# Patient Record
Sex: FEMALE | Race: WHITE | Employment: FULL TIME | URBAN - METROPOLITAN AREA
[De-identification: names, ages, dates, MRNs, and addresses within clinical notes are randomized per-mention and may not be internally consistent; named-entity substitution may affect disease eponyms.]

---

## 2017-05-01 ENCOUNTER — APPOINTMENT (OUTPATIENT)
Dept: CT IMAGING | Age: 52
DRG: 391 | End: 2017-05-01
Attending: EMERGENCY MEDICINE
Payer: COMMERCIAL

## 2017-05-01 ENCOUNTER — HOSPITAL ENCOUNTER (INPATIENT)
Age: 52
LOS: 5 days | Discharge: HOME | DRG: 391 | End: 2017-05-06
Attending: EMERGENCY MEDICINE | Admitting: INTERNAL MEDICINE
Payer: COMMERCIAL

## 2017-05-01 DIAGNOSIS — K57.32 DIVERTICULITIS OF SIGMOID COLON: Primary | ICD-10-CM

## 2017-05-01 DIAGNOSIS — K85.91 ACUTE PANCREATITIS WITH UNINFECTED NECROSIS, UNSPECIFIED PANCREATITIS TYPE: ICD-10-CM

## 2017-05-01 LAB
ALBUMIN SERPL BCP-MCNC: 3.1 G/DL (ref 3.5–5)
ALBUMIN/GLOB SERPL: 0.7 {RATIO} (ref 1.1–2.2)
ALP SERPL-CCNC: 55 U/L (ref 45–117)
ALT SERPL-CCNC: 13 U/L (ref 12–78)
ANION GAP BLD CALC-SCNC: 10 MMOL/L (ref 5–15)
APPEARANCE UR: CLEAR
AST SERPL W P-5'-P-CCNC: 8 U/L (ref 15–37)
ATRIAL RATE: 91 BPM
BACTERIA URNS QL MICRO: NEGATIVE /HPF
BASOPHILS # BLD AUTO: 0 K/UL (ref 0–0.1)
BASOPHILS # BLD: 0 % (ref 0–1)
BILIRUB SERPL-MCNC: 0.3 MG/DL (ref 0.2–1)
BILIRUB UR QL: NEGATIVE
BUN SERPL-MCNC: 16 MG/DL (ref 6–20)
BUN/CREAT SERPL: 24 (ref 12–20)
CALCIUM SERPL-MCNC: 8.6 MG/DL (ref 8.5–10.1)
CALCULATED P AXIS, ECG09: 62 DEGREES
CALCULATED R AXIS, ECG10: 62 DEGREES
CALCULATED T AXIS, ECG11: 67 DEGREES
CHLORIDE SERPL-SCNC: 108 MMOL/L (ref 97–108)
CO2 SERPL-SCNC: 24 MMOL/L (ref 21–32)
COLOR UR: ABNORMAL
CREAT SERPL-MCNC: 0.67 MG/DL (ref 0.55–1.02)
DIAGNOSIS, 93000: NORMAL
EOSINOPHIL # BLD: 0.1 K/UL (ref 0–0.4)
EOSINOPHIL NFR BLD: 1 % (ref 0–7)
EPITH CASTS URNS QL MICRO: ABNORMAL /LPF
ERYTHROCYTE [DISTWIDTH] IN BLOOD BY AUTOMATED COUNT: 13.8 % (ref 11.5–14.5)
GLOBULIN SER CALC-MCNC: 4.4 G/DL (ref 2–4)
GLUCOSE SERPL-MCNC: 116 MG/DL (ref 65–100)
GLUCOSE UR STRIP.AUTO-MCNC: NEGATIVE MG/DL
HCT VFR BLD AUTO: 39.3 % (ref 35–47)
HGB BLD-MCNC: 13.1 G/DL (ref 11.5–16)
HGB UR QL STRIP: NEGATIVE
KETONES UR QL STRIP.AUTO: NEGATIVE MG/DL
LACTATE SERPL-SCNC: 0.8 MMOL/L (ref 0.4–2)
LEUKOCYTE ESTERASE UR QL STRIP.AUTO: ABNORMAL
LIPASE SERPL-CCNC: 1167 U/L (ref 73–393)
LYMPHOCYTES # BLD AUTO: 16 % (ref 12–49)
LYMPHOCYTES # BLD: 2.3 K/UL (ref 0.8–3.5)
MCH RBC QN AUTO: 29.4 PG (ref 26–34)
MCHC RBC AUTO-ENTMCNC: 33.3 G/DL (ref 30–36.5)
MCV RBC AUTO: 88.1 FL (ref 80–99)
MONOCYTES # BLD: 1 K/UL (ref 0–1)
MONOCYTES NFR BLD AUTO: 7 % (ref 5–13)
NEUTS SEG # BLD: 10.7 K/UL (ref 1.8–8)
NEUTS SEG NFR BLD AUTO: 76 % (ref 32–75)
NITRITE UR QL STRIP.AUTO: NEGATIVE
P-R INTERVAL, ECG05: 162 MS
PH UR STRIP: 6 [PH] (ref 5–8)
PLATELET # BLD AUTO: 408 K/UL (ref 150–400)
POTASSIUM SERPL-SCNC: 3.5 MMOL/L (ref 3.5–5.1)
PROT SERPL-MCNC: 7.5 G/DL (ref 6.4–8.2)
PROT UR STRIP-MCNC: NEGATIVE MG/DL
Q-T INTERVAL, ECG07: 368 MS
QRS DURATION, ECG06: 86 MS
QTC CALCULATION (BEZET), ECG08: 452 MS
RBC # BLD AUTO: 4.46 M/UL (ref 3.8–5.2)
RBC #/AREA URNS HPF: ABNORMAL /HPF (ref 0–5)
SODIUM SERPL-SCNC: 142 MMOL/L (ref 136–145)
SP GR UR REFRACTOMETRY: <1.005 (ref 1–1.03)
TROPONIN I SERPL-MCNC: <0.04 NG/ML
UA: UC IF INDICATED,UAUC: ABNORMAL
UROBILINOGEN UR QL STRIP.AUTO: 0.2 EU/DL (ref 0.2–1)
VENTRICULAR RATE, ECG03: 91 BPM
WBC # BLD AUTO: 14 K/UL (ref 3.6–11)
WBC URNS QL MICRO: ABNORMAL /HPF (ref 0–4)

## 2017-05-01 PROCEDURE — 85025 COMPLETE CBC W/AUTO DIFF WBC: CPT | Performed by: PHYSICIAN ASSISTANT

## 2017-05-01 PROCEDURE — 74177 CT ABD & PELVIS W/CONTRAST: CPT

## 2017-05-01 PROCEDURE — 74011636320 HC RX REV CODE- 636/320: Performed by: EMERGENCY MEDICINE

## 2017-05-01 PROCEDURE — 74011250637 HC RX REV CODE- 250/637: Performed by: INTERNAL MEDICINE

## 2017-05-01 PROCEDURE — 99285 EMERGENCY DEPT VISIT HI MDM: CPT

## 2017-05-01 PROCEDURE — 74011000250 HC RX REV CODE- 250: Performed by: INTERNAL MEDICINE

## 2017-05-01 PROCEDURE — 74011250636 HC RX REV CODE- 250/636: Performed by: EMERGENCY MEDICINE

## 2017-05-01 PROCEDURE — 93005 ELECTROCARDIOGRAM TRACING: CPT

## 2017-05-01 PROCEDURE — 96361 HYDRATE IV INFUSION ADD-ON: CPT

## 2017-05-01 PROCEDURE — 84484 ASSAY OF TROPONIN QUANT: CPT | Performed by: EMERGENCY MEDICINE

## 2017-05-01 PROCEDURE — 65270000015 HC RM PRIVATE ONCOLOGY

## 2017-05-01 PROCEDURE — 83690 ASSAY OF LIPASE: CPT | Performed by: EMERGENCY MEDICINE

## 2017-05-01 PROCEDURE — 87040 BLOOD CULTURE FOR BACTERIA: CPT | Performed by: EMERGENCY MEDICINE

## 2017-05-01 PROCEDURE — 96375 TX/PRO/DX INJ NEW DRUG ADDON: CPT

## 2017-05-01 PROCEDURE — 96365 THER/PROPH/DIAG IV INF INIT: CPT

## 2017-05-01 PROCEDURE — 74011000258 HC RX REV CODE- 258: Performed by: EMERGENCY MEDICINE

## 2017-05-01 PROCEDURE — 74011250636 HC RX REV CODE- 250/636: Performed by: PHYSICIAN ASSISTANT

## 2017-05-01 PROCEDURE — 36415 COLL VENOUS BLD VENIPUNCTURE: CPT | Performed by: PHYSICIAN ASSISTANT

## 2017-05-01 PROCEDURE — 81001 URINALYSIS AUTO W/SCOPE: CPT | Performed by: PHYSICIAN ASSISTANT

## 2017-05-01 PROCEDURE — 83605 ASSAY OF LACTIC ACID: CPT | Performed by: EMERGENCY MEDICINE

## 2017-05-01 PROCEDURE — 80053 COMPREHEN METABOLIC PANEL: CPT | Performed by: EMERGENCY MEDICINE

## 2017-05-01 PROCEDURE — 74011250636 HC RX REV CODE- 250/636: Performed by: INTERNAL MEDICINE

## 2017-05-01 RX ORDER — SODIUM CHLORIDE 9 MG/ML
50 INJECTION, SOLUTION INTRAVENOUS
Status: COMPLETED | OUTPATIENT
Start: 2017-05-01 | End: 2017-05-01

## 2017-05-01 RX ORDER — HYDROMORPHONE HYDROCHLORIDE 2 MG/ML
2 INJECTION, SOLUTION INTRAMUSCULAR; INTRAVENOUS; SUBCUTANEOUS
Status: DISCONTINUED | OUTPATIENT
Start: 2017-05-01 | End: 2017-05-01 | Stop reason: SDUPTHER

## 2017-05-01 RX ORDER — SODIUM CHLORIDE 0.9 % (FLUSH) 0.9 %
5-10 SYRINGE (ML) INJECTION AS NEEDED
Status: DISCONTINUED | OUTPATIENT
Start: 2017-05-01 | End: 2017-05-06 | Stop reason: HOSPADM

## 2017-05-01 RX ORDER — HYDRALAZINE HYDROCHLORIDE 20 MG/ML
10 INJECTION INTRAMUSCULAR; INTRAVENOUS
Status: DISCONTINUED | OUTPATIENT
Start: 2017-05-01 | End: 2017-05-06 | Stop reason: HOSPADM

## 2017-05-01 RX ORDER — BISACODYL 5 MG
5 TABLET, DELAYED RELEASE (ENTERIC COATED) ORAL DAILY PRN
Status: DISCONTINUED | OUTPATIENT
Start: 2017-05-01 | End: 2017-05-06 | Stop reason: HOSPADM

## 2017-05-01 RX ORDER — METRONIDAZOLE 500 MG/100ML
500 INJECTION, SOLUTION INTRAVENOUS EVERY 8 HOURS
Status: DISCONTINUED | OUTPATIENT
Start: 2017-05-01 | End: 2017-05-05

## 2017-05-01 RX ORDER — ONDANSETRON 2 MG/ML
4 INJECTION INTRAMUSCULAR; INTRAVENOUS
Status: DISCONTINUED | OUTPATIENT
Start: 2017-05-01 | End: 2017-05-06 | Stop reason: HOSPADM

## 2017-05-01 RX ORDER — SODIUM CHLORIDE, SODIUM LACTATE, POTASSIUM CHLORIDE, CALCIUM CHLORIDE 600; 310; 30; 20 MG/100ML; MG/100ML; MG/100ML; MG/100ML
100 INJECTION, SOLUTION INTRAVENOUS CONTINUOUS
Status: DISCONTINUED | OUTPATIENT
Start: 2017-05-01 | End: 2017-05-05

## 2017-05-01 RX ORDER — VALSARTAN 40 MG/1
40 TABLET ORAL DAILY
Status: DISCONTINUED | OUTPATIENT
Start: 2017-05-02 | End: 2017-05-02

## 2017-05-01 RX ORDER — HYDROMORPHONE HYDROCHLORIDE 1 MG/ML
2 INJECTION, SOLUTION INTRAMUSCULAR; INTRAVENOUS; SUBCUTANEOUS
Status: DISCONTINUED | OUTPATIENT
Start: 2017-05-01 | End: 2017-05-05

## 2017-05-01 RX ORDER — ONDANSETRON 2 MG/ML
4 INJECTION INTRAMUSCULAR; INTRAVENOUS
Status: COMPLETED | OUTPATIENT
Start: 2017-05-01 | End: 2017-05-01

## 2017-05-01 RX ORDER — ACETAMINOPHEN 325 MG/1
650 TABLET ORAL
Status: DISCONTINUED | OUTPATIENT
Start: 2017-05-01 | End: 2017-05-06 | Stop reason: HOSPADM

## 2017-05-01 RX ORDER — LEVOFLOXACIN 5 MG/ML
750 INJECTION, SOLUTION INTRAVENOUS EVERY 24 HOURS
Status: DISCONTINUED | OUTPATIENT
Start: 2017-05-01 | End: 2017-05-05

## 2017-05-01 RX ORDER — SODIUM CHLORIDE 0.9 % (FLUSH) 0.9 %
5-10 SYRINGE (ML) INJECTION EVERY 8 HOURS
Status: DISCONTINUED | OUTPATIENT
Start: 2017-05-01 | End: 2017-05-06 | Stop reason: HOSPADM

## 2017-05-01 RX ORDER — IBUPROFEN 200 MG
1 TABLET ORAL
Status: DISCONTINUED | OUTPATIENT
Start: 2017-05-01 | End: 2017-05-06 | Stop reason: HOSPADM

## 2017-05-01 RX ORDER — ENOXAPARIN SODIUM 100 MG/ML
40 INJECTION SUBCUTANEOUS DAILY
Status: DISCONTINUED | OUTPATIENT
Start: 2017-05-02 | End: 2017-05-06 | Stop reason: HOSPADM

## 2017-05-01 RX ORDER — HYDROMORPHONE HYDROCHLORIDE 1 MG/ML
1 INJECTION, SOLUTION INTRAMUSCULAR; INTRAVENOUS; SUBCUTANEOUS
Status: DISCONTINUED | OUTPATIENT
Start: 2017-05-01 | End: 2017-05-05

## 2017-05-01 RX ORDER — NALOXONE HYDROCHLORIDE 0.4 MG/ML
0.4 INJECTION, SOLUTION INTRAMUSCULAR; INTRAVENOUS; SUBCUTANEOUS AS NEEDED
Status: DISCONTINUED | OUTPATIENT
Start: 2017-05-01 | End: 2017-05-06 | Stop reason: HOSPADM

## 2017-05-01 RX ORDER — MORPHINE SULFATE 4 MG/ML
4 INJECTION, SOLUTION INTRAMUSCULAR; INTRAVENOUS
Status: COMPLETED | OUTPATIENT
Start: 2017-05-01 | End: 2017-05-01

## 2017-05-01 RX ORDER — MORPHINE SULFATE 2 MG/ML
4 INJECTION, SOLUTION INTRAMUSCULAR; INTRAVENOUS
Status: COMPLETED | OUTPATIENT
Start: 2017-05-01 | End: 2017-05-01

## 2017-05-01 RX ORDER — SODIUM CHLORIDE 0.9 % (FLUSH) 0.9 %
10 SYRINGE (ML) INJECTION
Status: COMPLETED | OUTPATIENT
Start: 2017-05-01 | End: 2017-05-01

## 2017-05-01 RX ADMIN — Medication 10 ML: at 14:16

## 2017-05-01 RX ADMIN — Medication 5 ML: at 21:58

## 2017-05-01 RX ADMIN — PIPERACILLIN SODIUM,TAZOBACTAM SODIUM 3.38 G: 3; .375 INJECTION, POWDER, FOR SOLUTION INTRAVENOUS at 12:03

## 2017-05-01 RX ADMIN — HYDROMORPHONE HYDROCHLORIDE 2 MG: 1 INJECTION, SOLUTION INTRAMUSCULAR; INTRAVENOUS; SUBCUTANEOUS at 17:59

## 2017-05-01 RX ADMIN — ONDANSETRON HYDROCHLORIDE 4 MG: 2 INJECTION, SOLUTION INTRAMUSCULAR; INTRAVENOUS at 10:11

## 2017-05-01 RX ADMIN — SODIUM CHLORIDE, SODIUM LACTATE, POTASSIUM CHLORIDE, AND CALCIUM CHLORIDE 150 ML/HR: 600; 310; 30; 20 INJECTION, SOLUTION INTRAVENOUS at 23:29

## 2017-05-01 RX ADMIN — ONDANSETRON HYDROCHLORIDE 4 MG: 2 INJECTION, SOLUTION INTRAMUSCULAR; INTRAVENOUS at 18:44

## 2017-05-01 RX ADMIN — METRONIDAZOLE 500 MG: 500 INJECTION, SOLUTION INTRAVENOUS at 20:18

## 2017-05-01 RX ADMIN — SODIUM CHLORIDE 1000 ML: 900 INJECTION, SOLUTION INTRAVENOUS at 10:05

## 2017-05-01 RX ADMIN — HYDROMORPHONE HYDROCHLORIDE 2 MG: 1 INJECTION, SOLUTION INTRAMUSCULAR; INTRAVENOUS; SUBCUTANEOUS at 14:08

## 2017-05-01 RX ADMIN — Medication 10 ML: at 11:17

## 2017-05-01 RX ADMIN — IOPAMIDOL 100 ML: 755 INJECTION, SOLUTION INTRAVENOUS at 11:14

## 2017-05-01 RX ADMIN — Medication 10 ML: at 10:07

## 2017-05-01 RX ADMIN — LEVOFLOXACIN 750 MG: 5 INJECTION, SOLUTION INTRAVENOUS at 17:59

## 2017-05-01 RX ADMIN — ONDANSETRON HYDROCHLORIDE 4 MG: 2 INJECTION, SOLUTION INTRAMUSCULAR; INTRAVENOUS at 21:58

## 2017-05-01 RX ADMIN — Medication 4 MG: at 12:31

## 2017-05-01 RX ADMIN — SODIUM CHLORIDE 1000 ML: 900 INJECTION, SOLUTION INTRAVENOUS at 12:01

## 2017-05-01 RX ADMIN — MORPHINE SULFATE 4 MG: 4 INJECTION, SOLUTION INTRAMUSCULAR; INTRAVENOUS at 10:08

## 2017-05-01 RX ADMIN — SODIUM CHLORIDE 50 ML/HR: 900 INJECTION, SOLUTION INTRAVENOUS at 11:18

## 2017-05-01 RX ADMIN — Medication 10 ML: at 14:17

## 2017-05-01 RX ADMIN — SODIUM CHLORIDE, SODIUM LACTATE, POTASSIUM CHLORIDE, AND CALCIUM CHLORIDE 150 ML/HR: 600; 310; 30; 20 INJECTION, SOLUTION INTRAVENOUS at 14:07

## 2017-05-01 RX ADMIN — DIATRIZOATE MEGLUMINE AND DIATRIZOATE SODIUM 30 ML: 660; 100 LIQUID ORAL; RECTAL at 10:19

## 2017-05-01 RX ADMIN — HYDROMORPHONE HYDROCHLORIDE 2 MG: 1 INJECTION, SOLUTION INTRAMUSCULAR; INTRAVENOUS; SUBCUTANEOUS at 21:58

## 2017-05-01 NOTE — ED NOTES
TRANSFER - OUT REPORT:    Verbal report given to  Cammie Lee (name) on Arloa Ahumada  being transferred to [phone] (unit) for routine progression of care       Report consisted of patients Situation, Background, Assessment and   Recommendations(SBAR). Information from the following report(s) ED Summary was reviewed with the receiving nurse. Lines:   Peripheral IV 05/01/17 Left Hand (Active)   Site Assessment Clean, dry, & intact 5/1/2017  9:22 AM   Phlebitis Assessment 0 5/1/2017  9:22 AM   Infiltration Assessment 0 5/1/2017  9:22 AM   Dressing Status Clean, dry, & intact 5/1/2017  9:22 AM       Peripheral IV 05/01/17 Right Antecubital (Active)   Site Assessment Clean, dry, & intact 5/1/2017 12:04 PM   Phlebitis Assessment 0 5/1/2017 12:04 PM   Infiltration Assessment 0 5/1/2017 12:04 PM   Dressing Status Clean, dry, & intact 5/1/2017 12:04 PM        Opportunity for questions and clarification was provided.       Patient transported with:   Monitor not needed for medical transport

## 2017-05-01 NOTE — ED PROVIDER NOTES
HPI Comments: 47 yo F with PMHx of diverticulitis, HTN, hx of ectopic preg presenting with diffuse abdominal pain since 8pm last night. It is associated with nausea. Denies f/c. Pt states she was recently treated with 2 courses of antibiotics in the past 3 weeks for diverticulitis. Pt states she felt well on Saturday and thought she was getting over it. Pain initially was mostly RLQ and now generalized. States she has been having some dark urine but denies dysuria. Denies nausea currently. Last normal BM was 5 weeks ago but she still has small hard stools. Denies diarrhea. Patient is a 46 y.o. female presenting with abdominal pain. Abdominal Pain    Associated symptoms include nausea and constipation. Pertinent negatives include no diarrhea. Past Medical History:   Diagnosis Date    Gastrointestinal disorder     diverticulitis    Hypertension        Past Surgical History:   Procedure Laterality Date    HX GYN      ectopic pregnancy    HX GYN      BTL         Family History:   Problem Relation Age of Onset    Colon Cancer         Social History     Social History    Marital status:      Spouse name: N/A    Number of children: N/A    Years of education: N/A     Occupational History    Not on file. Social History Main Topics    Smoking status: Current Every Day Smoker     Packs/day: 1.50     Types: Cigarettes    Smokeless tobacco: Never Used    Alcohol use 0.0 oz/week     0 Standard drinks or equivalent per week      Comment: socially    Drug use: Yes     Special: Marijuana      Comment: once a week    Sexual activity: Yes     Partners: Male     Birth control/ protection: Surgical     Other Topics Concern    Not on file     Social History Narrative         ALLERGIES: Lisinopril    Review of Systems   Constitutional: Negative. HENT: Negative. Respiratory: Negative. Cardiovascular: Negative. Gastrointestinal: Positive for abdominal pain, constipation and nausea.  Negative for diarrhea and rectal pain. Genitourinary: Negative. Musculoskeletal: Negative. Skin: Negative. Psychiatric/Behavioral: Negative. Vitals:    05/01/17 0902 05/01/17 0903   BP: 140/86 140/86   Pulse:  88   Resp:  16   Temp:  98 °F (36.7 °C)   Weight:  85.3 kg (188 lb 0.8 oz)   Height:  5' 2\" (1.575 m)            Physical Exam   Constitutional: She is oriented to person, place, and time. She appears well-developed and well-nourished. HENT:   Head: Normocephalic and atraumatic. Eyes: Conjunctivae and EOM are normal.   Neck: Neck supple. Cardiovascular: Normal rate and regular rhythm. Pulmonary/Chest: Effort normal and breath sounds normal. No respiratory distress. She has no wheezes. She has no rales. Abdominal: Soft. Bowel sounds are normal.   Diffuse TTP, No rebound, voluntary guarding. + Jolt test.   Musculoskeletal: Normal range of motion. Neurological: She is alert and oriented to person, place, and time. Skin: Skin is warm and dry. Psychiatric: She has a normal mood and affect. Nursing note and vitals reviewed. MDM  Number of Diagnoses or Management Options  Diagnosis management comments: DDx: diverticulitis, perforation, bowel obstruction    45 yo well appearing female with PMHx of diverticulitis presenting with c/o diffuse abdominal pain in setting of completion of 2 courses of antibiotics for diverticulitis. Obtain labs, CT A/P. Give IVF, analgesics. ED Course   1145: Pt continues with pain. Will re-dose Morphine. 1200: Pt CT demonstrates persistent diverticulitis in addition to pancreatitis. Blood cx and Zosyn ordered. Discussed with Hospitalist.      Consult Note 1215    Haris Tenorio MD spoke with Dr. Belgica Donis  Specialty: IM  Discussed pt's hx, disposition, and available diagnostic and imaging results. Reviewed care plans. Consultant recommends plan as outlined:no new recommendations.       Procedures  LABORATORY TESTS:  Recent Results (from the past 12 hour(s))   CBC WITH AUTOMATED DIFF    Collection Time: 05/01/17  9:59 AM   Result Value Ref Range    WBC 14.0 (H) 3.6 - 11.0 K/uL    RBC 4.46 3.80 - 5.20 M/uL    HGB 13.1 11.5 - 16.0 g/dL    HCT 39.3 35.0 - 47.0 %    MCV 88.1 80.0 - 99.0 FL    MCH 29.4 26.0 - 34.0 PG    MCHC 33.3 30.0 - 36.5 g/dL    RDW 13.8 11.5 - 14.5 %    PLATELET 997 (H) 682 - 400 K/uL    NEUTROPHILS 76 (H) 32 - 75 %    LYMPHOCYTES 16 12 - 49 %    MONOCYTES 7 5 - 13 %    EOSINOPHILS 1 0 - 7 %    BASOPHILS 0 0 - 1 %    ABS. NEUTROPHILS 10.7 (H) 1.8 - 8.0 K/UL    ABS. LYMPHOCYTES 2.3 0.8 - 3.5 K/UL    ABS. MONOCYTES 1.0 0.0 - 1.0 K/UL    ABS. EOSINOPHILS 0.1 0.0 - 0.4 K/UL    ABS. BASOPHILS 0.0 0.0 - 0.1 K/UL   TROPONIN I    Collection Time: 05/01/17  9:59 AM   Result Value Ref Range    Troponin-I, Qt. <0.04 <0.05 ng/mL   LIPASE    Collection Time: 05/01/17  9:59 AM   Result Value Ref Range    Lipase 1167 (H) 73 - 640 U/L   METABOLIC PANEL, COMPREHENSIVE    Collection Time: 05/01/17  9:59 AM   Result Value Ref Range    Sodium 142 136 - 145 mmol/L    Potassium 3.5 3.5 - 5.1 mmol/L    Chloride 108 97 - 108 mmol/L    CO2 24 21 - 32 mmol/L    Anion gap 10 5 - 15 mmol/L    Glucose 116 (H) 65 - 100 mg/dL    BUN 16 6 - 20 MG/DL    Creatinine 0.67 0.55 - 1.02 MG/DL    BUN/Creatinine ratio 24 (H) 12 - 20      GFR est AA >60 >60 ml/min/1.73m2    GFR est non-AA >60 >60 ml/min/1.73m2    Calcium 8.6 8.5 - 10.1 MG/DL    Bilirubin, total 0.3 0.2 - 1.0 MG/DL    ALT (SGPT) 13 12 - 78 U/L    AST (SGOT) 8 (L) 15 - 37 U/L    Alk.  phosphatase 55 45 - 117 U/L    Protein, total 7.5 6.4 - 8.2 g/dL    Albumin 3.1 (L) 3.5 - 5.0 g/dL    Globulin 4.4 (H) 2.0 - 4.0 g/dL    A-G Ratio 0.7 (L) 1.1 - 2.2     LACTIC ACID, PLASMA    Collection Time: 05/01/17 10:00 AM   Result Value Ref Range    Lactic acid 0.8 0.4 - 2.0 MMOL/L   EKG, 12 LEAD, INITIAL    Collection Time: 05/01/17 10:26 AM   Result Value Ref Range    Ventricular Rate 91 BPM    Atrial Rate 91 BPM    P-R Interval 162 ms    QRS Duration 86 ms    Q-T Interval 368 ms    QTC Calculation (Bezet) 452 ms    Calculated P Axis 62 degrees    Calculated R Axis 62 degrees    Calculated T Axis 67 degrees    Diagnosis       Normal sinus rhythm  Normal ECG  When compared with ECG of 16-AUG-2011 20:42,  Nonspecific T wave abnormality no longer evident in Anterior leads  Confirmed by Eugene Esparza [id number]) on 5/1/2017 11:22:46 AM     URINALYSIS W/ REFLEX CULTURE    Collection Time: 05/01/17 12:06 PM   Result Value Ref Range    Color YELLOW/STRAW      Appearance CLEAR CLEAR      Specific gravity <1.005 1.003 - 1.030    pH (UA) 6.0 5.0 - 8.0      Protein NEGATIVE  NEG mg/dL    Glucose NEGATIVE  NEG mg/dL    Ketone NEGATIVE  NEG mg/dL    Bilirubin NEGATIVE  NEG      Blood NEGATIVE  NEG      Urobilinogen 0.2 0.2 - 1.0 EU/dL    Nitrites NEGATIVE  NEG      Leukocyte Esterase SMALL (A) NEG      WBC 0-4 0 - 4 /hpf    RBC 0-5 0 - 5 /hpf    Epithelial cells FEW FEW /lpf    Bacteria NEGATIVE  NEG /hpf    UA:UC IF INDICATED CULTURE NOT INDICATED BY UA RESULT CNI         IMAGING RESULTS:  CT ABD PELV W CONT   Final Result          MEDICATIONS GIVEN:  Medications   sodium chloride (NS) flush 5-10 mL (10 mL IntraVENous Given 5/1/17 1417)   sodium chloride (NS) flush 5-10 mL (not administered)   sodium chloride (NS) flush 5-10 mL (10 mL IntraVENous Given 5/1/17 1416)   sodium chloride (NS) flush 5-10 mL (not administered)   acetaminophen (TYLENOL) tablet 650 mg (not administered)   HYDROmorphone (PF) (DILAUDID) injection 1 mg (not administered)   naloxone (NARCAN) injection 0.4 mg (not administered)   ondansetron (ZOFRAN) injection 4 mg (not administered)   bisacodyl (DULCOLAX) tablet 5 mg (not administered)   enoxaparin (LOVENOX) injection 40 mg (not administered)   lactated ringers infusion (150 mL/hr IntraVENous New Bag 5/1/17 1407)   levoFLOXacin (LEVAQUIN) 750 mg in D5W IVPB (not administered)   metroNIDAZOLE (FLAGYL) IVPB premix 500 mg (not administered) hydrALAZINE (APRESOLINE) 20 mg/mL injection 10 mg (not administered)   HYDROmorphone (PF) (DILAUDID) injection 2 mg (2 mg IntraVENous Given 5/1/17 1408)   valsartan (DIOVAN) tablet 40 mg (not administered)   nicotine (NICODERM CQ) 21 mg/24 hr patch 1 Patch (1 Patch TransDERmal Apply Patch 5/1/17 1408)   sodium chloride 0.9 % bolus infusion 1,000 mL (0 mL IntraVENous IV Completed 5/1/17 1318)   morphine injection 4 mg (4 mg IntraVENous Given 5/1/17 1008)   sodium chloride 0.9 % bolus infusion 1,000 mL (0 mL IntraVENous IV Completed 5/1/17 1200)   0.9% sodium chloride infusion (0 mL/hr IntraVENous IV Completed 5/1/17 1200)   sodium chloride (NS) flush 10 mL (10 mL IntraVENous Given 5/1/17 1117)   iopamidol (ISOVUE-370) 76 % injection 100 mL (100 mL IntraVENous Given 5/1/17 1114)   diatrizoate meglumine-d.sodium (MD-GASTROVIEW,GASTROGRAFIN) 66-10 % contrast solution 30 mL (30 mL Oral Given 5/1/17 1019)   ondansetron (ZOFRAN) injection 4 mg (4 mg IntraVENous Given 5/1/17 1011)   piperacillin-tazobactam (ZOSYN) 3.375 g in 0.9% sodium chloride (MBP/ADV) 100 mL (0 g IntraVENous IV Completed 5/1/17 1318)   morphine injection 4 mg (4 mg IntraVENous Given 5/1/17 1231)       IMPRESSION:  1. Diverticulitis of sigmoid colon    2. Acute pancreatitis with uninfected necrosis, unspecified pancreatitis type        PLAN:  Current Discharge Medication List        Follow-up Information     None        Return to ED if worse       ADMIT NOTE:    1215 PM  Patient is being admitted to the hospital by Dr. Deann Fung The results of their tests and reasons for their admission have been discussed with the patient and/or available family. They convey agreement and understanding for the need to be admitted and for the admission diagnosis. EKG interpretation: (Preliminary)  10:26 AM  Rhythm: normal sinus rhythm; and regular . Rate (approx.): 91 bpm; Axis: normal; OH interval: normal; QRS interval: normal ; ST/T wave: normal, no ST changes. Written by Bita Ricci ED Scribe, as dictated by Simona Plascencia DO. Attending Attestation:  10:12 AM    I personally performed a history and physical examination of the patient and discussed the management with the resident. I have found the following on physical exam:    General: NAD  HEENT: EOMI, non-icteric sclera  Chest: RRR, no m/r/g  Lungs: CTAB  Abd: generalized abdominal tenderness to palpation, worse in the LLQ, nd, soft, hyperactive bs. No guarding or rebound. Ext: no peripheral edema, no cyanosis, +2 peripheral pulses  Skin: no rashes or lesions  Neuro: no focal deficits      I have reviewed the resident's note and agree with the resident's findings, including all diagnostic interpretations, treatment and plan of care, except as documented below. I was present during the key portions of separately billed procedures.     Simona Plascencia DO

## 2017-05-01 NOTE — IP AVS SNAPSHOT
[address] [facility] 
[phone] Patient: Arloa Ahumada MRN: [id number] :1965 You are allergic to the following Allergen Reactions Lisinopril Cough Recent Documentation Height Weight Breastfeeding? BMI OB Status Smoking Status 1.575 m 85.3 kg No 34.4 kg/m2 Postmenopausal Current Every Day Smoker Unresulted Labs Order Current Status CULTURE, BLOOD Preliminary result CULTURE, BLOOD Preliminary result Emergency Contacts  (Rel.) Home Phone Work Phone Mobile Phone Eduardo De La O [phone] -- [phone] About your hospitalization You were admitted on:  May 1, 2017 You last received care in the:  [facility] 1 MEDICAL ONCOLOGY You were discharged on:  May 6, 2017 Why you were hospitalized Your primary diagnosis was:  Not on File Your diagnoses also included:  Diverticulitis Providers Seen During Your Hospitalizations Provider Role Specialty Primary office phone Rica Isaacs DO Attending Provider Emergency Medicine [phone] Daniela Beckford MD Attending Provider Internal Medicine [phone] Your Primary Care Physician (PCP) Primary Care Physician Office Phone Office Fax NONE ** None ** ** None ** Follow-up Information Follow up With Details Comments Contact Info None   None ([phone]) Patient stated that they have no PCP Current Discharge Medication List  
  
START taking these medications Dose & Instructions Dispensing Information Comments Morning Noon Evening Bedtime  
 docusate sodium 100 mg capsule Commonly known as:  Crystal Preciado Your last dose was: Your next dose is:    
   
   
 Dose:  100 mg Take 1 Cap by mouth two (2) times a day for 30 days. Quantity:  60 Cap Refills:  1  
     
   
   
   
  
 levoFLOXacin 750 mg tablet Commonly known as:  Esha Phillips Your last dose was: Your next dose is:    
   
   
 Dose:  750 mg Take 1 Tab by mouth Daily (before dinner) for 14 days. Quantity:  14 Tab Refills:  0  
     
   
   
   
  
 metroNIDAZOLE 500 mg tablet Commonly known as:  FLAGYL Your last dose was: Your next dose is:    
   
   
 Dose:  500 mg Take 1 Tab by mouth three (3) times daily for 14 days. Quantity:  42 Tab Refills:  0  
     
   
   
   
  
 nicotine 14 mg/24 hr patch Commonly known as:  Fatimah Sifuentes Your last dose was: Your next dose is:    
   
   
 Dose:  1 Patch 1 Patch by TransDERmal route every twenty-four (24) hours for 30 days. Quantity:  30 Patch Refills:  1  
     
   
   
   
  
 ondansetron 4 mg disintegrating tablet Commonly known as:  ZOFRAN ODT Your last dose was: Your next dose is:    
   
   
 Dose:  4 mg Take 1 Tab by mouth every eight (8) hours as needed for Nausea. Quantity:  15 Tab Refills:  1  
     
   
   
   
  
 oxyCODONE-acetaminophen 5-325 mg per tablet Commonly known as:  PERCOCET Your last dose was: Your next dose is:    
   
   
 Dose:  1-2 Tab Take 1-2 Tabs by mouth every six (6) hours as needed. Max Daily Amount: 8 Tabs. For pain Quantity:  40 Tab Refills:  0  
     
   
   
   
  
 polyethylene glycol 17 gram packet Commonly known as:  Stefan Osgood Your last dose was: Your next dose is:    
   
   
 Dose:  17 g Take 1 Packet by mouth daily as needed. For constipation Quantity:  30 Each Refills:  1  
     
   
   
   
  
 valsartan 160 mg tablet Commonly known as:  DIOVAN Your last dose was: Your next dose is:    
   
   
 Dose:  160 mg Take 1 Tab by mouth daily for 30 days. Quantity:  30 Tab Refills:  2 STOP taking these medications   
 hydroCHLOROthiazide 12.5 mg tablet Commonly known as:  HYDRODIURIL  
   
  
  
  
 Where to Get Your Medications Information on where to get these meds will be given to you by the nurse or doctor. ! Ask your nurse or doctor about these medications  
  docusate sodium 100 mg capsule  
 levoFLOXacin 750 mg tablet  
 metroNIDAZOLE 500 mg tablet  
 nicotine 14 mg/24 hr patch  
 ondansetron 4 mg disintegrating tablet  
 oxyCODONE-acetaminophen 5-325 mg per tablet  
 polyethylene glycol 17 gram packet  
 valsartan 160 mg tablet Discharge Instructions HOSPITALIST DISCHARGE INSTRUCTIONS 
 
NAME: Robin Ramirez :  1965 MRN:  [id number] Date/Time:  2017 9:48 AM 
 
ADMIT DATE: 2017 DISCHARGE DATE: 2017 Attending Physician: Marino Hdez MD 
 
DISCHARGE DIAGNOSIS: 
Acute diverticulitis without abscess Hypertension Acute pancreatitis, resolved Tobacco use MEDICATIONS: 
See above · It is important that you take the medication exactly as they are prescribed. · Keep your medication in the bottles provided by the pharmacist and keep a list of the medication names, dosages, and times to be taken in your wallet. · Do not take other medications without consulting your doctor. Pain Management: per above medications What to do at [facility] Recommended diet:  Regular Diet, make sure that you are drinking plenty of water Recommended activity: Activity as tolerated If you have questions regarding the hospital related prescriptions or hospital related issues please call [facility] at . You can always direct your questions to your primary care doctor if you are unable to reach your hospital physician; your PCP works as an extension of your hospital doctor just like your hospital doctor is an extension of your PCP for your time at [facility].  
 
If you experience any of the following symptoms then please call your primary care physician or return to the emergency room if you cannot get hold of your doctor: 
Fever, chills, nausea, vomiting, diarrhea, change in mentation, falling, bleeding, shortness of breath Additional Instructions: 
 
Try to avoid constipation (goal to have a bowel movement every 1-2 days). See instructions below. I recommend that you take an over-the-counter probiotic (such as Anjum Huddle, or store generic) to see if this helps with your overall bowel health. Bring these papers with you to your follow up appointments. The papers will help your doctors be sure to continue the care plan from the hospital. 
 
 
 
 
 
 
Information obtained by : 
I understand that if any problems occur once I am at home I am to contact my physician. I understand and acknowledge receipt of the instructions indicated above. Physician's or R.N.'s Signature                                                                  Date/Time Patient or Representative Signature                                                          Date/Time Constipation: Care Instructions Your Care Instructions Constipation means that you have a hard time passing stools (bowel movements). People pass stools from 3 times a day to once every 3 days. What is normal for you may be different. Constipation may occur with pain in the rectum and cramping. The pain may get worse when you try to pass stools. Sometimes there are small amounts of bright red blood on toilet paper or the surface of stools. This is because of enlarged veins near the rectum (hemorrhoids). A few changes in your diet and lifestyle may help you avoid ongoing constipation. Your doctor may also prescribe medicine to help loosen your stool. Some medicines can cause constipation. These include pain medicines and antidepressants. Tell your doctor about all the medicines you take. Your doctor may want to make a medicine change to ease your symptoms. Follow-up care is a key part of your treatment and safety. Be sure to make and go to all appointments, and call your doctor if you are having problems. It's also a good idea to know your test results and keep a list of the medicines you take. How can you care for yourself at home? · Drink plenty of fluids, enough so that your urine is light yellow or clear like water. If you have kidney, heart, or liver disease and have to limit fluids, talk with your doctor before you increase the amount of fluids you drink. · Include high-fiber foods in your diet each day. These include fruits, vegetables, beans, and whole grains. · Get at least 30 minutes of exercise on most days of the week. Walking is a good choice. You also may want to do other activities, such as running, swimming, cycling, or playing tennis or team sports. · Take a fiber supplement, such as Citrucel or Metamucil, every day. Read and follow all instructions on the label. · Schedule time each day for a bowel movement. A daily routine may help. Take your time having your bowel movement. · Support your feet with a small step stool when you sit on the toilet. This helps flex your hips and places your pelvis in a squatting position. · Your doctor may recommend an over-the-counter laxative to relieve your constipation. Examples are Milk of Magnesia and MiraLax. Read and follow all instructions on the label. Do not use laxatives on a long-term basis. When should you call for help? Call your doctor now or seek immediate medical care if: 
· You have new or worse belly pain. · You have new or worse nausea or vomiting. · You have blood in your stools. Watch closely for changes in your health, and be sure to contact your doctor if: · Your constipation is getting worse. · You do not get better as expected. Where can you learn more? Go to http://ruby-zenaida.info/. Enter 21  in the search box to learn more about \"Constipation: Care Instructions. \" Current as of: May 27, 2016 Content Version: 11.2 © 0975-2724 [employer]. Care instructions adapted under license by [employer] (which disclaims liability or warranty for this information). If you have questions about a medical condition or this instruction, always ask your healthcare professional. [address] any warranty or liability for your use of this information. Discharge Orders None Clearwave Announcement We are excited to announce that we are making your provider's discharge notes available to you in Clearwave. You will see these notes when they are completed and signed by the physician that discharged you from your recent hospital stay. If you have any questions or concerns about any information you see in Clearwave, please call the Health Information Department where you were seen or reach out to your Primary Care Provider for more information about your plan of care. Introducing [facility] [place] [facility]! Meghann Christopher introduces Clearwave patient portal. Now you can access parts of your medical record, email your doctor's office, and request medication refills online. 1. In your internet browser, go to [email or web address] "[employer]"/Lemon Curve 2. Click on the First Time User? Click Here link in the Sign In box. You will see the New Member Sign Up page. 3. Enter your Clearwave Access Code exactly as it appears below. You will not need to use this code after youve completed the sign-up process. If you do not sign up before the expiration date, you must request a new code. · Clearwave Access Code: [email or web address] Expires: 7/30/2017  9:07 AM 
 
 4. Enter the last four digits of your Social Security Number (xxxx) and Date of Birth (mm/dd/yyyy) as indicated and click Submit. You will be taken to the next sign-up page. 5. Create a Avanzit ID. This will be your Avanzit login ID and cannot be changed, so think of one that is secure and easy to remember. 6. Create a Avanzit password. You can change your password at any time. 7. Enter your Password Reset Question and Answer. This can be used at a later time if you forget your password. 8. Enter your e-mail address. You will receive e-mail notification when new information is available in [address] 9. Click Sign Up. You can now view and download portions of your medical record. 10. Click the Download Summary menu link to download a portable copy of your medical information. If you have questions, please visit the Frequently Asked Questions section of the Avanzit website. Remember, Avanzit is NOT to be used for urgent needs. For medical emergencies, dial 911. Now available from your iPhone and Android! General Information Please provide this summary of care documentation to your next provider. Patient Signature:  ____________________________________________________________ Date:  ____________________________________________________________  
  
Evangelina Bob Provider Signature:  ____________________________________________________________ Date:  ____________________________________________________________

## 2017-05-01 NOTE — H&P
Hospitalist Admission Note    NAME: Iftikhar Ocampo   :  1965   MRN:  [id number]     Date/Time:  2017 12:18 PM    Patient PCP: None  ________________________________________________________________________    My assessment of this patient's clinical condition and my plan of care is as follows. Assessment / Plan:  Acute pancreatitis: unclear etiology, says she drinks only ~2x per month. - normal GB/ducts on CT today as below  - stop HCTZ  - IV fluids  - serial lipase  Acute diverticulitis without abscess:    - CT A/P with severe sigmoid diverticulitis without evidence of perforation or abscess. The findings appear to have progressed in comparison with the prior examination 6 months ago. - IV fluids  - IV levaquin, flagyl (given zosyn x 1 in ER)  - clears as tolerated  - says that she had normal colonoscopy after episode last year, does not need additional GI f/u at this time (saw Allegra Pinto) as she is planning surgical f/u for resection. Hypertension:    - stopping HCTZ as above  - start valsartan (cough with ACE)  - prn hydrlazine  Tobacco abuse:  con't nicotine patch  Morbid obesity    Code Status: Full  Surrogate Decision Maker:   DVT Prophylaxis: lovenox        Subjective:   CHIEF COMPLAINT:  Abdominal pain    HISTORY OF PRESENT ILLNESS:     Tam Barnard is a 46 y.o.  female who presents with above. Pt recently moved to [facility] so has no PCP. She continues to struggle with diverticulitis which has been a chronic issue. She was last admitted for this last year with findings of abscess on CT. Today, she complains again of lower abdominal pain which radiates to the left. She denies nausea and has vomited only once. She says that she has been constipated with very small hard stools. She denies fevers at home. No cough or SOB. No CP or lightheadedness. She has been seen in the ER over in [facility] twice in the last couple of weeks.  She was treated with cipro/flagyl x 1 week and then returned with ongoing sx and was given more. Her pain has finally reached the point that she came here for inpatient treatment. She denies new edema or focal weakness. We were asked to admit for work up and evaluation of the above problems. Past Medical History:   Diagnosis Date    Hypertension     Morbid obesity ([address]     Sigmoid diverticulitis     Tobacco use         Past Surgical History:   Procedure Laterality Date    HX GYN      ectopic pregnancy    HX GYN      BTL       Social History   Substance Use Topics    Smoking status: Current Every Day Smoker     Packs/day: 1.50     Types: Cigarettes    Smokeless tobacco: Never Used    Alcohol use 0.0 oz/week     0 Standard drinks or equivalent per week      Comment: socially        Family History   Problem Relation Age of Onset    Colon Cancer Other      Allergies   Allergen Reactions    Lisinopril Cough        Prior to Admission medications    Medication Sig Start Date End Date Taking? Authorizing Provider   hydrochlorothiazide (HYDRODIURIL) 12.5 mg tablet Take 12.5 mg by mouth daily. Yes Phys Other, MD       REVIEW OF SYSTEMS:     I am not able to complete the review of systems because:    The patient is intubated and sedated    The patient has altered mental status due to his acute medical problems    The patient has baseline aphasia from prior stroke(s)    The patient has baseline dementia and is not reliable historian    The patient is in acute medical distress and unable to provide information           Total of 12 systems reviewed as follows:       POSITIVE= underlined text  Negative = text not underlined  General:  fever, chills, sweats, generalized weakness, weight loss/gain,      loss of appetite   Eyes:    blurred vision, eye pain, loss of vision, double vision  ENT:    rhinorrhea, pharyngitis   Respiratory:   cough, sputum production, SOB, MORAES, wheezing, pleuritic pain   Cardiology:   chest pain, palpitations, orthopnea, PND, edema, syncope   Gastrointestinal:  abdominal pain , N/V, diarrhea, dysphagia, constipation, bleeding   Genitourinary:  frequency, urgency, dysuria, hematuria, incontinence   Muskuloskeletal :  arthralgia, myalgia, back pain  Hematology:  easy bruising, nose or gum bleeding, lymphadenopathy   Dermatological: rash, ulceration, pruritis, color change / jaundice  Endocrine:   hot flashes or polydipsia   Neurological:  headache, dizziness, confusion, focal weakness, paresthesia,     Speech difficulties, memory loss, gait difficulty  Psychological: Feelings of anxiety, depression, agitation    Objective:   VITALS:    Visit Vitals    /68    Pulse 88    Temp 98 °F (36.7 °C)    Resp 16    Ht 5' 2\" (1.575 m)    Wt 85.3 kg (188 lb 0.8 oz)    SpO2 97%    BMI 34.4 kg/m2       PHYSICAL EXAM:    General:    Obese, alert, cooperative, no distress, appears stated age. HEENT: Atraumatic, anicteric sclerae, pink conjunctivae     No oral ulcers, mucosa moist, throat clear, dentition fair  Neck:  Supple, symmetrical,  thyroid: non tender  Lungs:   Clear to auscultation bilaterally. No Wheezing or Rhonchi. No rales. Chest wall:  No tenderness  No Accessory muscle use. Heart:   Regular rhythm,  No  murmur   No edema  Abdomen:   Soft, lower abdominal tenderness. moderately distended. Bowel sounds normal  Extremities: No cyanosis. No clubbing,      Skin turgor normal, Capillary refill normal, Radial dial pulse 2+  Skin:     Not pale. Not Jaundiced  No rashes   Psych:  Good insight. Not depressed. Not anxious or agitated. Neurologic: EOMs intact. No facial asymmetry. No aphasia or slurred speech. Symmetrical strength, Sensation grossly intact.  Alert and oriented X 4.     _______________________________________________________________________  Care Plan discussed with:    Comments   Patient x    Family  x son   RN x    Care Manager                    Consultant: _______________________________________________________________________  Expected  Disposition:   Home with Family x   HH/PT/OT/RN    SNF/LTC    TESFAYE    ________________________________________________________________________  TOTAL TIME:  48 Minutes    Critical Care Provided     Minutes non procedure based      Comments    x Reviewed previous records   >50% of visit spent in counseling and coordination of care x Discussion with patient and/or family and questions answered       ________________________________________________________________________  Signed: Sonja Thomas MD    Procedures: see electronic medical records for all procedures/Xrays and details which were not copied into this note but were reviewed prior to creation of Plan. LAB DATA REVIEWED:    Recent Results (from the past 24 hour(s))   CBC WITH AUTOMATED DIFF    Collection Time: 05/01/17  9:59 AM   Result Value Ref Range    WBC 14.0 (H) 3.6 - 11.0 K/uL    RBC 4.46 3.80 - 5.20 M/uL    HGB 13.1 11.5 - 16.0 g/dL    HCT 39.3 35.0 - 47.0 %    MCV 88.1 80.0 - 99.0 FL    MCH 29.4 26.0 - 34.0 PG    MCHC 33.3 30.0 - 36.5 g/dL    RDW 13.8 11.5 - 14.5 %    PLATELET 458 (H) 806 - 400 K/uL    NEUTROPHILS 76 (H) 32 - 75 %    LYMPHOCYTES 16 12 - 49 %    MONOCYTES 7 5 - 13 %    EOSINOPHILS 1 0 - 7 %    BASOPHILS 0 0 - 1 %    ABS. NEUTROPHILS 10.7 (H) 1.8 - 8.0 K/UL    ABS. LYMPHOCYTES 2.3 0.8 - 3.5 K/UL    ABS. MONOCYTES 1.0 0.0 - 1.0 K/UL    ABS. EOSINOPHILS 0.1 0.0 - 0.4 K/UL    ABS.  BASOPHILS 0.0 0.0 - 0.1 K/UL   TROPONIN I    Collection Time: 05/01/17  9:59 AM   Result Value Ref Range    Troponin-I, Qt. <0.04 <0.05 ng/mL   LIPASE    Collection Time: 05/01/17  9:59 AM   Result Value Ref Range    Lipase 1167 (H) 73 - 011 U/L   METABOLIC PANEL, COMPREHENSIVE    Collection Time: 05/01/17  9:59 AM   Result Value Ref Range    Sodium 142 136 - 145 mmol/L    Potassium 3.5 3.5 - 5.1 mmol/L    Chloride 108 97 - 108 mmol/L    CO2 24 21 - 32 mmol/L    Anion gap 10 5 - 15 mmol/L    Glucose 116 (H) 65 - 100 mg/dL    BUN 16 6 - 20 MG/DL    Creatinine 0.67 0.55 - 1.02 MG/DL    BUN/Creatinine ratio 24 (H) 12 - 20      GFR est AA >60 >60 ml/min/1.73m2    GFR est non-AA >60 >60 ml/min/1.73m2    Calcium 8.6 8.5 - 10.1 MG/DL    Bilirubin, total 0.3 0.2 - 1.0 MG/DL    ALT (SGPT) 13 12 - 78 U/L    AST (SGOT) 8 (L) 15 - 37 U/L    Alk.  phosphatase 55 45 - 117 U/L    Protein, total 7.5 6.4 - 8.2 g/dL    Albumin 3.1 (L) 3.5 - 5.0 g/dL    Globulin 4.4 (H) 2.0 - 4.0 g/dL    A-G Ratio 0.7 (L) 1.1 - 2.2     LACTIC ACID, PLASMA    Collection Time: 05/01/17 10:00 AM   Result Value Ref Range    Lactic acid 0.8 0.4 - 2.0 MMOL/L   EKG, 12 LEAD, INITIAL    Collection Time: 05/01/17 10:26 AM   Result Value Ref Range    Ventricular Rate 91 BPM    Atrial Rate 91 BPM    P-R Interval 162 ms    QRS Duration 86 ms    Q-T Interval 368 ms    QTC Calculation (Bezet) 452 ms    Calculated P Axis 62 degrees    Calculated R Axis 62 degrees    Calculated T Axis 67 degrees    Diagnosis       Normal sinus rhythm  Normal ECG  When compared with ECG of 16-AUG-2011 20:42,  Nonspecific T wave abnormality no longer evident in Anterior leads  Confirmed by Al Ralls ([id number]) on 5/1/2017 11:22:46 AM

## 2017-05-01 NOTE — PROGRESS NOTES
Pharmacy Automatic Renal Dosing Protocol - Antimicrobials    Indication for Antimicrobials: diverticulitis/pancreatitis  Current Regimen of Each Antimicrobial (Start Day & Day of Therapy):  Levaquin 750 mg daily (day 1 on )  Flagyl 500 mg every 8 hours (day 1 on )    Significant cultures: blood cx X 2 (collected )    CAPD, Intermittent Hemodialysis or Renal Replacement Therapy: no  Paralysis, amputations, malnutrition: no  Recent Labs      17   0959   CREA  0.67   BUN  16   WBC  14.0*     Temp (24hrs), Av ° F (36.7 ° C), Min:98 ° F (36.7 ° C), Max:98 ° F (36.7 ° C)    Creatinine Clearance (ml/min): 73 ml/min      Impression/Plan: levaquin 750 mg daily and flagyl 500 mg every 8 hours dosed correctly        Pharmacy will follow daily and adjust doses of monitored medications as appropriate for renal function and/or serum levels. Thank you,  Savita Swan, PHARMD           Loading dose entered? No   Daily BMP ordered? Yes   Maintenance dose entered? Yes   Previous order discontinued? Yes   Progress note entered? Yes   Serum Level(s) ordered? No       Renal Dosing Tables on the Pharmacy Web Site                Pharmacist will perform automatic renal dosage adjustment for certain medications   Antimicrobials: acyclovir, aminoglycosides, amoxicillin, amoxicillin/clavulanate, ampicillin, ampicillin/sulbactam, aztreonam, cefazolin, cefepime, cefotetan, ceftazidime, ciprofloxacin, colistin, daptomycin, doripenem, ertapenem,  ethambutol, fluconazole, imipenem/cilastatin, levofloxacin, linezolid, meropenem, oseltamivir, piperacillin/tazobactam, pyrazinamide, valacyclovir, vancomycin   Creatinine Clearance will be calculated using the Cockroft-Gault equation, using the lessor of ideal or actual body weight.  Monitoring will continue daily for any medication that is changed and appropriate adjustments will be made as needed for the duration of therapy.    Upon initiation of renal dosing or when a dosage adjustment is made, the pharmacist will write a progress note with the indication, calculated creatinine clearance, and dosing regimen. The pharmacist will use per protocol when changing orders.

## 2017-05-02 LAB
ALBUMIN SERPL BCP-MCNC: 2.5 G/DL (ref 3.5–5)
ALBUMIN/GLOB SERPL: 0.6 {RATIO} (ref 1.1–2.2)
ALP SERPL-CCNC: 48 U/L (ref 45–117)
ALT SERPL-CCNC: 12 U/L (ref 12–78)
ANION GAP BLD CALC-SCNC: 9 MMOL/L (ref 5–15)
AST SERPL W P-5'-P-CCNC: 7 U/L (ref 15–37)
BILIRUB SERPL-MCNC: 0.2 MG/DL (ref 0.2–1)
BUN SERPL-MCNC: 6 MG/DL (ref 6–20)
BUN/CREAT SERPL: 11 (ref 12–20)
CALCIUM SERPL-MCNC: 8.3 MG/DL (ref 8.5–10.1)
CHLORIDE SERPL-SCNC: 107 MMOL/L (ref 97–108)
CO2 SERPL-SCNC: 25 MMOL/L (ref 21–32)
CREAT SERPL-MCNC: 0.54 MG/DL (ref 0.55–1.02)
ERYTHROCYTE [DISTWIDTH] IN BLOOD BY AUTOMATED COUNT: 14.1 % (ref 11.5–14.5)
GLOBULIN SER CALC-MCNC: 4.1 G/DL (ref 2–4)
GLUCOSE SERPL-MCNC: 96 MG/DL (ref 65–100)
HCT VFR BLD AUTO: 35.8 % (ref 35–47)
HGB BLD-MCNC: 11.7 G/DL (ref 11.5–16)
LIPASE SERPL-CCNC: 308 U/L (ref 73–393)
MCH RBC QN AUTO: 29.5 PG (ref 26–34)
MCHC RBC AUTO-ENTMCNC: 32.7 G/DL (ref 30–36.5)
MCV RBC AUTO: 90.2 FL (ref 80–99)
PLATELET # BLD AUTO: 333 K/UL (ref 150–400)
POTASSIUM SERPL-SCNC: 3.6 MMOL/L (ref 3.5–5.1)
PROT SERPL-MCNC: 6.6 G/DL (ref 6.4–8.2)
RBC # BLD AUTO: 3.97 M/UL (ref 3.8–5.2)
SODIUM SERPL-SCNC: 141 MMOL/L (ref 136–145)
WBC # BLD AUTO: 8.9 K/UL (ref 3.6–11)

## 2017-05-02 PROCEDURE — 74011250636 HC RX REV CODE- 250/636: Performed by: INTERNAL MEDICINE

## 2017-05-02 PROCEDURE — 36415 COLL VENOUS BLD VENIPUNCTURE: CPT | Performed by: INTERNAL MEDICINE

## 2017-05-02 PROCEDURE — 83690 ASSAY OF LIPASE: CPT | Performed by: INTERNAL MEDICINE

## 2017-05-02 PROCEDURE — 74011000250 HC RX REV CODE- 250: Performed by: INTERNAL MEDICINE

## 2017-05-02 PROCEDURE — 65270000015 HC RM PRIVATE ONCOLOGY

## 2017-05-02 PROCEDURE — 80053 COMPREHEN METABOLIC PANEL: CPT | Performed by: INTERNAL MEDICINE

## 2017-05-02 PROCEDURE — 74011250637 HC RX REV CODE- 250/637: Performed by: INTERNAL MEDICINE

## 2017-05-02 PROCEDURE — 74011250636 HC RX REV CODE- 250/636: Performed by: EMERGENCY MEDICINE

## 2017-05-02 PROCEDURE — 74011000250 HC RX REV CODE- 250: Performed by: HOSPITALIST

## 2017-05-02 PROCEDURE — 85027 COMPLETE CBC AUTOMATED: CPT | Performed by: INTERNAL MEDICINE

## 2017-05-02 PROCEDURE — 74011250636 HC RX REV CODE- 250/636: Performed by: HOSPITALIST

## 2017-05-02 RX ORDER — VALSARTAN 160 MG/1
160 TABLET ORAL DAILY
Status: DISCONTINUED | OUTPATIENT
Start: 2017-05-02 | End: 2017-05-06 | Stop reason: HOSPADM

## 2017-05-02 RX ADMIN — HYDROMORPHONE HYDROCHLORIDE 1 MG: 1 INJECTION, SOLUTION INTRAMUSCULAR; INTRAVENOUS; SUBCUTANEOUS at 14:13

## 2017-05-02 RX ADMIN — HYDROMORPHONE HYDROCHLORIDE 2 MG: 1 INJECTION, SOLUTION INTRAMUSCULAR; INTRAVENOUS; SUBCUTANEOUS at 06:06

## 2017-05-02 RX ADMIN — METRONIDAZOLE 500 MG: 500 INJECTION, SOLUTION INTRAVENOUS at 09:07

## 2017-05-02 RX ADMIN — SODIUM CHLORIDE 5 MG: 9 INJECTION INTRAMUSCULAR; INTRAVENOUS; SUBCUTANEOUS at 21:27

## 2017-05-02 RX ADMIN — ONDANSETRON HYDROCHLORIDE 4 MG: 2 INJECTION, SOLUTION INTRAMUSCULAR; INTRAVENOUS at 10:17

## 2017-05-02 RX ADMIN — SODIUM CHLORIDE, SODIUM LACTATE, POTASSIUM CHLORIDE, AND CALCIUM CHLORIDE 100 ML/HR: 600; 310; 30; 20 INJECTION, SOLUTION INTRAVENOUS at 19:39

## 2017-05-02 RX ADMIN — Medication 10 ML: at 06:06

## 2017-05-02 RX ADMIN — ONDANSETRON HYDROCHLORIDE 4 MG: 2 INJECTION, SOLUTION INTRAMUSCULAR; INTRAVENOUS at 06:06

## 2017-05-02 RX ADMIN — ONDANSETRON HYDROCHLORIDE 4 MG: 2 INJECTION, SOLUTION INTRAMUSCULAR; INTRAVENOUS at 02:05

## 2017-05-02 RX ADMIN — SODIUM CHLORIDE, SODIUM LACTATE, POTASSIUM CHLORIDE, AND CALCIUM CHLORIDE 100 ML/HR: 600; 310; 30; 20 INJECTION, SOLUTION INTRAVENOUS at 16:09

## 2017-05-02 RX ADMIN — ENOXAPARIN SODIUM 40 MG: 40 INJECTION SUBCUTANEOUS at 13:27

## 2017-05-02 RX ADMIN — Medication 10 ML: at 21:36

## 2017-05-02 RX ADMIN — ONDANSETRON HYDROCHLORIDE 4 MG: 2 INJECTION, SOLUTION INTRAMUSCULAR; INTRAVENOUS at 19:06

## 2017-05-02 RX ADMIN — LEVOFLOXACIN 750 MG: 5 INJECTION, SOLUTION INTRAVENOUS at 16:12

## 2017-05-02 RX ADMIN — HYDROMORPHONE HYDROCHLORIDE 2 MG: 1 INJECTION, SOLUTION INTRAMUSCULAR; INTRAVENOUS; SUBCUTANEOUS at 10:17

## 2017-05-02 RX ADMIN — Medication 10 ML: at 13:29

## 2017-05-02 RX ADMIN — Medication 10 ML: at 13:28

## 2017-05-02 RX ADMIN — HYDROMORPHONE HYDROCHLORIDE 2 MG: 1 INJECTION, SOLUTION INTRAMUSCULAR; INTRAVENOUS; SUBCUTANEOUS at 19:06

## 2017-05-02 RX ADMIN — HYDROMORPHONE HYDROCHLORIDE 1 MG: 1 INJECTION, SOLUTION INTRAMUSCULAR; INTRAVENOUS; SUBCUTANEOUS at 15:13

## 2017-05-02 RX ADMIN — HYDROMORPHONE HYDROCHLORIDE 2 MG: 1 INJECTION, SOLUTION INTRAMUSCULAR; INTRAVENOUS; SUBCUTANEOUS at 02:05

## 2017-05-02 RX ADMIN — METRONIDAZOLE 500 MG: 500 INJECTION, SOLUTION INTRAVENOUS at 02:06

## 2017-05-02 RX ADMIN — ACETAMINOPHEN 650 MG: 325 TABLET ORAL at 16:50

## 2017-05-02 RX ADMIN — HYDROMORPHONE HYDROCHLORIDE 2 MG: 1 INJECTION, SOLUTION INTRAMUSCULAR; INTRAVENOUS; SUBCUTANEOUS at 23:46

## 2017-05-02 RX ADMIN — ACETAMINOPHEN 650 MG: 325 TABLET ORAL at 09:02

## 2017-05-02 RX ADMIN — METRONIDAZOLE 500 MG: 500 INJECTION, SOLUTION INTRAVENOUS at 17:27

## 2017-05-02 RX ADMIN — SODIUM CHLORIDE, SODIUM LACTATE, POTASSIUM CHLORIDE, AND CALCIUM CHLORIDE 150 ML/HR: 600; 310; 30; 20 INJECTION, SOLUTION INTRAVENOUS at 07:21

## 2017-05-02 RX ADMIN — VALSARTAN 160 MG: 160 TABLET ORAL at 09:02

## 2017-05-02 NOTE — PROGRESS NOTES
Oncology Nursing Communication Tool      8:37 PM  5/1/2017     Bedside and Verbal shift change report given to Babatunde Villa RN (incoming nurse) by Caity Cunningham RN (outgoing nurse) on Carline Elder a 46 y.o. female who was admitted on 5/1/2017  8:57 AM. Report included the following information SBAR, Kardex, Procedure Summary, Intake/Output, MAR and Recent Results. Significant changes during shift: Pain controlled, IV antibiotics begun, one bout of nausea with dry heaves.   Patient tolerated 50% of clear liquid dinner      Issues for physician to address: Nausea            Code Status: Full Code     Infections: No current active infections     Allergies: Lisinopril     Current diet: DIET CLEAR LIQUID       Pain Meds [x] yes [] no   Bowel Movement [] yes [x] no   Last Bowel Movement (date) Unknown            Vital Signs:   Patient Vitals for the past 12 hrs:   Temp Pulse Resp BP SpO2   05/01/17 1426 - - - 125/82 -   05/01/17 1330 97.5 °F (36.4 °C) 68 18 (!) 145/98 99 %   05/01/17 1301 - - - - 97 %   05/01/17 1300 - - - 132/86 -   05/01/17 1245 - - - 141/83 97 %   05/01/17 1230 - - - 128/73 97 %   05/01/17 1215 - - - 126/80 95 %   05/01/17 1201 - - - 133/72 95 %   05/01/17 1130 - - - 139/66 98 %   05/01/17 1100 - - - 126/74 98 %   05/01/17 1045 - - - 132/82 96 %   05/01/17 1030 - - - 128/74 96 %   05/01/17 1028 - - - - 97 %   05/01/17 1015 - - - 118/68 97 %   05/01/17 1008 - - - - 97 %   05/01/17 1000 - - - 135/69 96 %   05/01/17 0956 - - - 137/85 97 %   05/01/17 0930 - - - 132/74 96 %   05/01/17 0923 - - - 144/77 97 %   05/01/17 0903 98 °F (36.7 °C) 88 16 140/86 -   05/01/17 0902 - - - 140/86 -      Intake & Output:     Intake/Output Summary (Last 24 hours) at 05/01/17 2037  Last data filed at 05/01/17 1759   Gross per 24 hour   Intake              360 ml   Output                0 ml   Net              360 ml      Laboratory Results:     Recent Results (from the past 12 hour(s))   CBC WITH AUTOMATED DIFF    Collection Time: 05/01/17  9:59 AM   Result Value Ref Range    WBC 14.0 (H) 3.6 - 11.0 K/uL    RBC 4.46 3.80 - 5.20 M/uL    HGB 13.1 11.5 - 16.0 g/dL    HCT 39.3 35.0 - 47.0 %    MCV 88.1 80.0 - 99.0 FL    MCH 29.4 26.0 - 34.0 PG    MCHC 33.3 30.0 - 36.5 g/dL    RDW 13.8 11.5 - 14.5 %    PLATELET 681 (H) 068 - 400 K/uL    NEUTROPHILS 76 (H) 32 - 75 %    LYMPHOCYTES 16 12 - 49 %    MONOCYTES 7 5 - 13 %    EOSINOPHILS 1 0 - 7 %    BASOPHILS 0 0 - 1 %    ABS. NEUTROPHILS 10.7 (H) 1.8 - 8.0 K/UL    ABS. LYMPHOCYTES 2.3 0.8 - 3.5 K/UL    ABS. MONOCYTES 1.0 0.0 - 1.0 K/UL    ABS. EOSINOPHILS 0.1 0.0 - 0.4 K/UL    ABS. BASOPHILS 0.0 0.0 - 0.1 K/UL   TROPONIN I    Collection Time: 05/01/17  9:59 AM   Result Value Ref Range    Troponin-I, Qt. <0.04 <0.05 ng/mL   LIPASE    Collection Time: 05/01/17  9:59 AM   Result Value Ref Range    Lipase 1167 (H) 73 - 338 U/L   METABOLIC PANEL, COMPREHENSIVE    Collection Time: 05/01/17  9:59 AM   Result Value Ref Range    Sodium 142 136 - 145 mmol/L    Potassium 3.5 3.5 - 5.1 mmol/L    Chloride 108 97 - 108 mmol/L    CO2 24 21 - 32 mmol/L    Anion gap 10 5 - 15 mmol/L    Glucose 116 (H) 65 - 100 mg/dL    BUN 16 6 - 20 MG/DL    Creatinine 0.67 0.55 - 1.02 MG/DL    BUN/Creatinine ratio 24 (H) 12 - 20      GFR est AA >60 >60 ml/min/1.73m2    GFR est non-AA >60 >60 ml/min/1.73m2    Calcium 8.6 8.5 - 10.1 MG/DL    Bilirubin, total 0.3 0.2 - 1.0 MG/DL    ALT (SGPT) 13 12 - 78 U/L    AST (SGOT) 8 (L) 15 - 37 U/L    Alk.  phosphatase 55 45 - 117 U/L    Protein, total 7.5 6.4 - 8.2 g/dL    Albumin 3.1 (L) 3.5 - 5.0 g/dL    Globulin 4.4 (H) 2.0 - 4.0 g/dL    A-G Ratio 0.7 (L) 1.1 - 2.2     LACTIC ACID, PLASMA    Collection Time: 05/01/17 10:00 AM   Result Value Ref Range    Lactic acid 0.8 0.4 - 2.0 MMOL/L   EKG, 12 LEAD, INITIAL    Collection Time: 05/01/17 10:26 AM   Result Value Ref Range    Ventricular Rate 91 BPM    Atrial Rate 91 BPM    P-R Interval 162 ms    QRS Duration 86 ms    Q-T Interval 368 ms    QTC Calculation (Bezet) 452 ms    Calculated P Axis 62 degrees    Calculated R Axis 62 degrees    Calculated T Axis 67 degrees    Diagnosis       Normal sinus rhythm  Normal ECG  When compared with ECG of 16-AUG-2011 20:42,  Nonspecific T wave abnormality no longer evident in Anterior leads  Confirmed by Eugene Esparza ([id number]) on 5/1/2017 11:22:46 AM     URINALYSIS W/ REFLEX CULTURE    Collection Time: 05/01/17 12:06 PM   Result Value Ref Range    Color YELLOW/STRAW      Appearance CLEAR CLEAR      Specific gravity <1.005 1.003 - 1.030    pH (UA) 6.0 5.0 - 8.0      Protein NEGATIVE  NEG mg/dL    Glucose NEGATIVE  NEG mg/dL    Ketone NEGATIVE  NEG mg/dL    Bilirubin NEGATIVE  NEG      Blood NEGATIVE  NEG      Urobilinogen 0.2 0.2 - 1.0 EU/dL    Nitrites NEGATIVE  NEG      Leukocyte Esterase SMALL (A) NEG      WBC 0-4 0 - 4 /hpf    RBC 0-5 0 - 5 /hpf    Epithelial cells FEW FEW /lpf    Bacteria NEGATIVE  NEG /hpf    UA:UC IF INDICATED CULTURE NOT INDICATED BY UA RESULT CNI                Opportunity for questions and clarifications were given to the incoming nurse. Patient's bed is in low position, side rails x2, door open PRN, call bell within reach and patient not in distress.       Najma Beltre RN

## 2017-05-02 NOTE — PROGRESS NOTES
Pt was admitted through the ED with complaints of diffuse abdominal pain associated with nausea. Pt is alert and oriented X 4. She recently moved to [place], [place] with her son; her spouse will be joining once he finishes home in [address] [place] [address]. Pt has not established with a PCP in [place] but has received a list from [facility] of participating MD's and plans to choose one when she returns home. Pt reports being independent with all ADLs and IADLS. She works outside the home. CM will follow for d/c needs. Care Management Interventions  PCP Verified by CM: No  Mode of Transport at Discharge:  Other (see comment) (family)  Transition of Care Consult (CM Consult): Discharge Planning  MyChart Signup: No  Discharge Durable Medical Equipment: No  Health Maintenance Reviewed: Yes  Physical Therapy Consult: No  Occupational Therapy Consult: No  Speech Therapy Consult: No    Francisco Corley, BSW  [phone]

## 2017-05-02 NOTE — PROGRESS NOTES
Spiritual Care Partner Volunteer visited patient in Oncology on May 2, 2017.   Documented by:  TAMMY Crowell, [facility], [address] [place] [address] [address]     Paging Service  [phone])

## 2017-05-02 NOTE — PROGRESS NOTES
Hospitalist Progress Note    NAME: Santa Castaneda   :  1965   MRN:  [id number]       Assessment / Plan:  Acute diverticulitis without abscess:  pain improving today, but still needing IV dilaudid  - CT A/P  with severe sigmoid diverticulitis without evidence of perforation or abscess. The findings appear to have progressed in comparison with the prior examination 6 months ago. - decrease IV fluids  - con't IV levaquin, flagyl (given zosyn x 1 in ER)  - advance to full liquid diet. Do not feel she is ready yet for solid food. - normal colonoscopy after episode last year, does not need additional GI f/u at this time (saw Lili Severance) as she is planning surgical f/u for resection. Discussed with general surgery yesterday, she will need to be fully treated and wait at least 6 weeks for inflammation to improve before she would be candidate for surgical intervention. Discussed with Pt this morning. Hypertension, benign/essential:    - stopping HCTZ due to pancreatitis   - increase valsartan (cough with ACE). Have asked CM to check insurance coverage and preferred [facility]. - prn hydralazine  Acute pancreatitis: unclear etiology, says she drinks only ~2x per month. Lipase 308 this morning, improving  - normal GB/ducts on CT as discussed above  - stopped HCTZ  - IV fluids as above  Tobacco abuse:  con't nicotine patch prn  Morbid obesity     Code Status: Full  Surrogate Decision Maker:   DVT Prophylaxis: lovenox     Subjective:     Chief Complaint / Reason for Physician Visit  \"I'm feeling better, but still with severe pain when the dilaudid runs out\". Discussed with RN events overnight.      Review of Systems:  Symptom Y/N Comments  Symptom Y/N Comments   Fever/Chills n   Chest Pain n    Poor Appetite n   Edema n    Cough n   Abdominal Pain y    Sputum n   Joint Pain     SOB/MORAES n   Pruritis/Rash     Nausea/vomit    Tolerating PT/OT     Diarrhea    Tolerating Diet     Constipation    Other Could NOT obtain due to:      Objective:     VITALS:   Last 24hrs VS reviewed since prior progress note. Most recent are:  Patient Vitals for the past 24 hrs:   Temp Pulse Resp BP SpO2   05/02/17 0604 97.5 °F (36.4 °C) 82 18 144/84 96 %   05/01/17 2158 98.1 °F (36.7 °C) 81 18 136/79 94 %   05/01/17 1426 - - - 125/82 -   05/01/17 1330 97.5 °F (36.4 °C) 68 18 (!) 145/98 99 %   05/01/17 1301 - - - - 97 %   05/01/17 1300 - - - 132/86 -   05/01/17 1245 - - - 141/83 97 %   05/01/17 1230 - - - 128/73 97 %   05/01/17 1215 - - - 126/80 95 %   05/01/17 1201 - - - 133/72 95 %   05/01/17 1130 - - - 139/66 98 %   05/01/17 1100 - - - 126/74 98 %   05/01/17 1045 - - - 132/82 96 %   05/01/17 1030 - - - 128/74 96 %   05/01/17 1028 - - - - 97 %   05/01/17 1015 - - - 118/68 97 %   05/01/17 1008 - - - - 97 %   05/01/17 1000 - - - 135/69 96 %   05/01/17 0956 - - - 137/85 97 %   05/01/17 0930 - - - 132/74 96 %   05/01/17 0923 - - - 144/77 97 %   05/01/17 0903 98 °F (36.7 °C) 88 16 140/86 -   05/01/17 0902 - - - 140/86 -       Intake/Output Summary (Last 24 hours) at 05/02/17 0818  Last data filed at 05/01/17 1759   Gross per 24 hour   Intake              360 ml   Output                0 ml   Net              360 ml        PHYSICAL EXAM:  General: Obese. Alert, cooperative, no acute distress    EENT:  EOMI. Anicteric sclerae. MMM  Resp:  CTA bilaterally, no wheezing or rales. No accessory muscle use  CV:  Regular rhythm,  No edema  GI:  Soft, moderately distended, bilateral LQ tendermess. L>R.  +Bowel sounds  Neurologic:  Alert and oriented X 3, normal speech,   Psych:   Good insight. Not anxious nor agitated  Skin:  No rashes.   No jaundice    Reviewed most current lab test results and cultures  YES  Reviewed most current radiology test results   YES  Review and summation of old records today    NO  Reviewed patient's current orders and MAR    YES  PMH/SH reviewed - no change compared to H&P  ________________________________________________________________________  Care Plan discussed with:    Comments   Patient x    Family      RN     Care Manager     Consultant                        Multidiciplinary team rounds were held today with , nursing, pharmacist and clinical coordinator. Patient's plan of care was discussed; medications were reviewed and discharge planning was addressed. ________________________________________________________________________  Total NON critical care TIME:  30 Minutes    Total CRITICAL CARE TIME Spent:   Minutes non procedure based      Comments   >50% of visit spent in counseling and coordination of care x    ________________________________________________________________________  Chandrakant Butler MD     Procedures: see electronic medical records for all procedures/Xrays and details which were not copied into this note but were reviewed prior to creation of Plan. LABS:  I reviewed today's most current labs and imaging studies.   Pertinent labs include:  Recent Labs      05/02/17   0605  05/01/17   0959   WBC  8.9  14.0*   HGB  11.7  13.1   HCT  35.8  39.3   PLT  333  408*     Recent Labs      05/02/17   0605  05/01/17   0959   NA  141  142   K  3.6  3.5   CL  107  108   CO2  25  24   GLU  96  116*   BUN  6  16   CREA  0.54*  0.67   CA  8.3*  8.6   ALB  2.5*  3.1*   TBILI  0.2  0.3   SGOT  7*  8*   ALT  12  13       Signed: Chandrakant Butler MD

## 2017-05-02 NOTE — PROGRESS NOTES
Oncology Nursing Communication Tool      7:20 PM  2017     Bedside shift change report given to SANDY Reyna (incoming nurse) by Brenna Honeycutt RN (outgoing nurse) on Adriane Avni a 46 y.o. female who was admitted on 2017  8:57 AM. Report included the following information SBAR. Significant changes during shift:       Issues for physician to address:             Code Status: Full Code     Infections: No current active infections     Allergies: Lisinopril     Current diet: DIET FULL LIQUID       Pain Controlled [x] yes [] no   Bowel Movement [] yes [x] no   Last Bowel Movement (date)             Vital Signs:   Patient Vitals for the past 12 hrs:   Temp Pulse Resp BP SpO2   17 1732 97.7 °F (36.5 °C) 81 18 133/60 93 %      Intake & Output:   No intake or output data in the 24 hours ending 17 1920   Laboratory Results:   No results found for this or any previous visit (from the past 12 hour(s)). Opportunity for questions and clarifications were given to the incoming nurse. Patient's bed is in low position, side rails x2, door open PRN, call bell within reach and patient not in distress.       Brenna Honeycutt RN

## 2017-05-03 LAB
ANION GAP BLD CALC-SCNC: 6 MMOL/L (ref 5–15)
BUN SERPL-MCNC: 3 MG/DL (ref 6–20)
BUN/CREAT SERPL: 6 (ref 12–20)
CALCIUM SERPL-MCNC: 8.3 MG/DL (ref 8.5–10.1)
CHLORIDE SERPL-SCNC: 105 MMOL/L (ref 97–108)
CO2 SERPL-SCNC: 30 MMOL/L (ref 21–32)
CREAT SERPL-MCNC: 0.51 MG/DL (ref 0.55–1.02)
ERYTHROCYTE [DISTWIDTH] IN BLOOD BY AUTOMATED COUNT: 13.8 % (ref 11.5–14.5)
GLUCOSE SERPL-MCNC: 93 MG/DL (ref 65–100)
HCT VFR BLD AUTO: 37 % (ref 35–47)
HGB BLD-MCNC: 11.9 G/DL (ref 11.5–16)
MCH RBC QN AUTO: 28.7 PG (ref 26–34)
MCHC RBC AUTO-ENTMCNC: 32.2 G/DL (ref 30–36.5)
MCV RBC AUTO: 89.4 FL (ref 80–99)
PLATELET # BLD AUTO: 340 K/UL (ref 150–400)
POTASSIUM SERPL-SCNC: 3.6 MMOL/L (ref 3.5–5.1)
RBC # BLD AUTO: 4.14 M/UL (ref 3.8–5.2)
SODIUM SERPL-SCNC: 141 MMOL/L (ref 136–145)
WBC # BLD AUTO: 11 K/UL (ref 3.6–11)

## 2017-05-03 PROCEDURE — 74011000250 HC RX REV CODE- 250: Performed by: HOSPITALIST

## 2017-05-03 PROCEDURE — 85027 COMPLETE CBC AUTOMATED: CPT | Performed by: INTERNAL MEDICINE

## 2017-05-03 PROCEDURE — 80048 BASIC METABOLIC PNL TOTAL CA: CPT | Performed by: INTERNAL MEDICINE

## 2017-05-03 PROCEDURE — 74011250636 HC RX REV CODE- 250/636: Performed by: EMERGENCY MEDICINE

## 2017-05-03 PROCEDURE — 36415 COLL VENOUS BLD VENIPUNCTURE: CPT | Performed by: INTERNAL MEDICINE

## 2017-05-03 PROCEDURE — 74011000250 HC RX REV CODE- 250: Performed by: INTERNAL MEDICINE

## 2017-05-03 PROCEDURE — 65270000015 HC RM PRIVATE ONCOLOGY

## 2017-05-03 PROCEDURE — 74011250636 HC RX REV CODE- 250/636: Performed by: HOSPITALIST

## 2017-05-03 PROCEDURE — 74011250636 HC RX REV CODE- 250/636: Performed by: INTERNAL MEDICINE

## 2017-05-03 PROCEDURE — 74011250637 HC RX REV CODE- 250/637: Performed by: INTERNAL MEDICINE

## 2017-05-03 RX ORDER — KETOROLAC TROMETHAMINE 30 MG/ML
30 INJECTION, SOLUTION INTRAMUSCULAR; INTRAVENOUS EVERY 6 HOURS
Status: COMPLETED | OUTPATIENT
Start: 2017-05-03 | End: 2017-05-05

## 2017-05-03 RX ADMIN — KETOROLAC TROMETHAMINE 30 MG: 30 INJECTION, SOLUTION INTRAMUSCULAR at 12:28

## 2017-05-03 RX ADMIN — HYDROMORPHONE HYDROCHLORIDE 2 MG: 1 INJECTION, SOLUTION INTRAMUSCULAR; INTRAVENOUS; SUBCUTANEOUS at 08:30

## 2017-05-03 RX ADMIN — KETOROLAC TROMETHAMINE 30 MG: 30 INJECTION, SOLUTION INTRAMUSCULAR at 18:01

## 2017-05-03 RX ADMIN — Medication 10 ML: at 20:54

## 2017-05-03 RX ADMIN — LEVOFLOXACIN 750 MG: 5 INJECTION, SOLUTION INTRAVENOUS at 16:22

## 2017-05-03 RX ADMIN — ONDANSETRON HYDROCHLORIDE 4 MG: 2 INJECTION, SOLUTION INTRAMUSCULAR; INTRAVENOUS at 04:19

## 2017-05-03 RX ADMIN — HYDROMORPHONE HYDROCHLORIDE 2 MG: 1 INJECTION, SOLUTION INTRAMUSCULAR; INTRAVENOUS; SUBCUTANEOUS at 12:36

## 2017-05-03 RX ADMIN — VALSARTAN 160 MG: 160 TABLET ORAL at 08:30

## 2017-05-03 RX ADMIN — SODIUM CHLORIDE, SODIUM LACTATE, POTASSIUM CHLORIDE, AND CALCIUM CHLORIDE 100 ML/HR: 600; 310; 30; 20 INJECTION, SOLUTION INTRAVENOUS at 07:07

## 2017-05-03 RX ADMIN — HYDROMORPHONE HYDROCHLORIDE 2 MG: 1 INJECTION, SOLUTION INTRAMUSCULAR; INTRAVENOUS; SUBCUTANEOUS at 18:01

## 2017-05-03 RX ADMIN — SODIUM CHLORIDE 5 MG: 9 INJECTION INTRAMUSCULAR; INTRAVENOUS; SUBCUTANEOUS at 08:34

## 2017-05-03 RX ADMIN — Medication 10 ML: at 07:06

## 2017-05-03 RX ADMIN — ENOXAPARIN SODIUM 40 MG: 40 INJECTION SUBCUTANEOUS at 12:28

## 2017-05-03 RX ADMIN — HYDROMORPHONE HYDROCHLORIDE 2 MG: 1 INJECTION, SOLUTION INTRAMUSCULAR; INTRAVENOUS; SUBCUTANEOUS at 22:07

## 2017-05-03 RX ADMIN — SODIUM CHLORIDE, SODIUM LACTATE, POTASSIUM CHLORIDE, AND CALCIUM CHLORIDE 100 ML/HR: 600; 310; 30; 20 INJECTION, SOLUTION INTRAVENOUS at 20:53

## 2017-05-03 RX ADMIN — Medication 10 ML: at 16:09

## 2017-05-03 RX ADMIN — METRONIDAZOLE 500 MG: 500 INJECTION, SOLUTION INTRAVENOUS at 18:02

## 2017-05-03 RX ADMIN — HYDROMORPHONE HYDROCHLORIDE 2 MG: 1 INJECTION, SOLUTION INTRAMUSCULAR; INTRAVENOUS; SUBCUTANEOUS at 04:06

## 2017-05-03 RX ADMIN — METRONIDAZOLE 500 MG: 500 INJECTION, SOLUTION INTRAVENOUS at 02:16

## 2017-05-03 RX ADMIN — METRONIDAZOLE 500 MG: 500 INJECTION, SOLUTION INTRAVENOUS at 10:07

## 2017-05-03 NOTE — PROGRESS NOTES
Oncology Nursing Communication Tool      7:20 PM  5/3/2017     Bedside shift change report given to Lainey Gillespie RN (incoming nurse) by Светлана Centeno RN (outgoing nurse) on Iftikhar Ocampo a 46 y.o. female who was admitted on 5/1/2017  8:57 AM. Report included the following information SBAR. Significant changes during shift: patient voided and had brownish discharge. Urine mild brownish color with sediments and cloudy. Brownish discharge subsided after 3 void. Urine with brown sediment, cloudy with odor. Issues for physician to address:   patient voided and had brownish discharge. Urine mild brownish color with sediments and cloudy. Brownish discharge subsided after 3 void. Urine with brown sediment, cloudy with odor.                Code Status: Full Code     Infections: No current active infections     Allergies: Lisinopril     Current diet: DIET FULL LIQUID       Pain Controlled [x] yes [] no   Bowel Movement [x] yes [] no   Last Bowel Movement (date) 5/3/17            Vital Signs:     Patient Vitals for the past 12 hrs:   Temp Pulse Resp BP SpO2   05/02/17 2133 97.8 °F (36.6 °C) 86 20 147/84 95 %      Intake & Output:       Intake/Output Summary (Last 24 hours) at 05/03/17 0818  Last data filed at 05/03/17 0630   Gross per 24 hour   Intake          6031.66 ml   Output             1100 ml   Net          4931.66 ml      Laboratory Results:     Recent Results (from the past 12 hour(s))   METABOLIC PANEL, BASIC    Collection Time: 05/03/17  4:13 AM   Result Value Ref Range    Sodium 141 136 - 145 mmol/L    Potassium 3.6 3.5 - 5.1 mmol/L    Chloride 105 97 - 108 mmol/L    CO2 30 21 - 32 mmol/L    Anion gap 6 5 - 15 mmol/L    Glucose 93 65 - 100 mg/dL    BUN 3 (L) 6 - 20 MG/DL    Creatinine 0.51 (L) 0.55 - 1.02 MG/DL    BUN/Creatinine ratio 6 (L) 12 - 20      GFR est AA >60 >60 ml/min/1.73m2    GFR est non-AA >60 >60 ml/min/1.73m2    Calcium 8.3 (L) 8.5 - 10.1 MG/DL   CBC W/O DIFF    Collection Time: 05/03/17  4:13 AM   Result Value Ref Range    WBC 11.0 3.6 - 11.0 K/uL    RBC 4.14 3.80 - 5.20 M/uL    HGB 11.9 11.5 - 16.0 g/dL    HCT 37.0 35.0 - 47.0 %    MCV 89.4 80.0 - 99.0 FL    MCH 28.7 26.0 - 34.0 PG    MCHC 32.2 30.0 - 36.5 g/dL    RDW 13.8 11.5 - 14.5 %    PLATELET 507 227 - 154 K/uL              Opportunity for questions and clarifications were given to the incoming nurse. Patient's bed is in low position, side rails x2, door open PRN, call bell within reach and patient not in distress.       Dori Davis RN

## 2017-05-03 NOTE — PROGRESS NOTES
Spiritual Care Partner Volunteer visited patient in oncology on 5/3/17.   Documented by:  TAMMY Rhodes, [facility], [address] [facility] [address]     Paging Service  [phone]

## 2017-05-03 NOTE — PROGRESS NOTES
Hospitalist Progress Note    NAME: Romina Felix   :  1965   MRN:  [id number]         Assessment / Plan:  Acute diverticulitis without abscess:  pain severe again overnight with vomiting x 1  - CT A/P  with severe sigmoid diverticulitis without evidence of perforation or abscess. The findings appear to have progressed in comparison with the prior examination 6 months ago. - con't IV fluids  - not ready to advance diet, con't liquids  - con't IV levaquin, flagyl (given zosyn x 1 in ER)  - IV toradol added scheduled due to pain. Con't prn dilaudid. - normal colonoscopy after episode last year, does not need additional GI f/u at this time (saw Cali Acosta) as she is planning surgical f/u for resection. Pt will need to be fully treated and wait at least 6 weeks for inflammation to improve before she would be candidate for surgical intervention. This has been discussed and she understands this POC. Hypertension, benign/essential:    - stopped HCTZ due to pancreatitis   - con't valsartan (cough with ACE)  - prn hydralazine  Acute pancreatitis: unclear etiology, resolved  - normal GB/ducts on CT as discussed above  - stopped HCTZ  - IV fluids as above  Tobacco abuse:  con't nicotine patch prn  Morbid obesity      Code Status: Full  Surrogate Decision Maker:   DVT Prophylaxis: lovenox     Subjective:     Chief Complaint / Reason for Physician Visit  \"I feel really nauseated this morning\". Unable to eat breakfast due to pain, nausea. Discussed with RN events overnight. Review of Systems:  Symptom Y/N Comments  Symptom Y/N Comments   Fever/Chills n   Chest Pain n    Poor Appetite n   Edema n    Cough n   Abdominal Pain y    Sputum n   Joint Pain     SOB/MORAES n   Pruritis/Rash     Nausea/vomit y   Tolerating PT/OT     Diarrhea    Tolerating Diet     Constipation    Other       Could NOT obtain due to:      Objective:     VITALS:   Last 24hrs VS reviewed since prior progress note.  Most recent are:  Patient Vitals for the past 24 hrs:   Temp Pulse Resp BP SpO2   05/02/17 2133 97.8 °F (36.6 °C) 86 20 147/84 95 %   05/02/17 1732 97.7 °F (36.5 °C) 81 18 133/60 93 %       Intake/Output Summary (Last 24 hours) at 05/03/17 0938  Last data filed at 05/03/17 0630   Gross per 24 hour   Intake          6031.66 ml   Output             1100 ml   Net          4931.66 ml        PHYSICAL EXAM:  General: Obese. Alert, cooperative, no acute distress    EENT:  EOMI. Anicteric sclerae. MMM  Resp:  CTA bilaterally, no wheezing or rales. No accessory muscle use  CV:  Regular rhythm,  No edema  GI:  Soft, mildly distended, moderate lower abdominal tenderness.  +Bowel sounds  Neurologic:  Alert and oriented X 3, normal speech,   Psych:   Good insight. Not anxious nor agitated  Skin:  No rashes. No jaundice    Reviewed most current lab test results and cultures  YES  Reviewed most current radiology test results   YES  Review and summation of old records today    NO  Reviewed patient's current orders and MAR    YES  PMH/SH reviewed - no change compared to H&P  ________________________________________________________________________  Care Plan discussed with:    Comments   Patient x    Family      RN     Care Manager     Consultant                        Multidiciplinary team rounds were held today with , nursing, pharmacist and clinical coordinator. Patient's plan of care was discussed; medications were reviewed and discharge planning was addressed.      ________________________________________________________________________  Total NON critical care TIME:  25 Minutes    Total CRITICAL CARE TIME Spent:   Minutes non procedure based      Comments   >50% of visit spent in counseling and coordination of care x    ________________________________________________________________________  Candi Apodaca MD     Procedures: see electronic medical records for all procedures/Xrays and details which were not copied into this note but were reviewed prior to creation of Plan. LABS:  I reviewed today's most current labs and imaging studies.   Pertinent labs include:  Recent Labs      05/03/17 0413 05/02/17 0605 05/01/17   0959   WBC  11.0  8.9  14.0*   HGB  11.9  11.7  13.1   HCT  37.0  35.8  39.3   PLT  340  333  408*     Recent Labs      05/03/17 0413 05/02/17 0605 05/01/17 0959   NA  141  141  142   K  3.6  3.6  3.5   CL  105  107  108   CO2  30  25  24   GLU  93  96  116*   BUN  3*  6  16   CREA  0.51*  0.54*  0.67   CA  8.3*  8.3*  8.6   ALB   --   2.5*  3.1*   TBILI   --   0.2  0.3   SGOT   --   7*  8*   ALT   --   12  13       Signed: Win Bradford MD

## 2017-05-04 PROCEDURE — 65270000015 HC RM PRIVATE ONCOLOGY

## 2017-05-04 PROCEDURE — 74011250636 HC RX REV CODE- 250/636: Performed by: EMERGENCY MEDICINE

## 2017-05-04 PROCEDURE — 74011250636 HC RX REV CODE- 250/636: Performed by: INTERNAL MEDICINE

## 2017-05-04 PROCEDURE — 74011250637 HC RX REV CODE- 250/637: Performed by: INTERNAL MEDICINE

## 2017-05-04 PROCEDURE — 74011000250 HC RX REV CODE- 250: Performed by: INTERNAL MEDICINE

## 2017-05-04 RX ADMIN — Medication 10 ML: at 13:41

## 2017-05-04 RX ADMIN — KETOROLAC TROMETHAMINE 30 MG: 30 INJECTION, SOLUTION INTRAMUSCULAR at 05:53

## 2017-05-04 RX ADMIN — METRONIDAZOLE 500 MG: 500 INJECTION, SOLUTION INTRAVENOUS at 09:52

## 2017-05-04 RX ADMIN — METRONIDAZOLE 500 MG: 500 INJECTION, SOLUTION INTRAVENOUS at 17:33

## 2017-05-04 RX ADMIN — SODIUM CHLORIDE, SODIUM LACTATE, POTASSIUM CHLORIDE, AND CALCIUM CHLORIDE 100 ML/HR: 600; 310; 30; 20 INJECTION, SOLUTION INTRAVENOUS at 07:47

## 2017-05-04 RX ADMIN — HYDROMORPHONE HYDROCHLORIDE 1 MG: 1 INJECTION, SOLUTION INTRAMUSCULAR; INTRAVENOUS; SUBCUTANEOUS at 23:32

## 2017-05-04 RX ADMIN — HYDROMORPHONE HYDROCHLORIDE 2 MG: 1 INJECTION, SOLUTION INTRAMUSCULAR; INTRAVENOUS; SUBCUTANEOUS at 01:57

## 2017-05-04 RX ADMIN — HYDROMORPHONE HYDROCHLORIDE 2 MG: 1 INJECTION, SOLUTION INTRAMUSCULAR; INTRAVENOUS; SUBCUTANEOUS at 11:04

## 2017-05-04 RX ADMIN — Medication 10 ML: at 05:53

## 2017-05-04 RX ADMIN — HYDRALAZINE HYDROCHLORIDE 10 MG: 20 INJECTION INTRAMUSCULAR; INTRAVENOUS at 21:09

## 2017-05-04 RX ADMIN — KETOROLAC TROMETHAMINE 30 MG: 30 INJECTION, SOLUTION INTRAMUSCULAR at 00:17

## 2017-05-04 RX ADMIN — Medication 10 ML: at 23:33

## 2017-05-04 RX ADMIN — HYDROMORPHONE HYDROCHLORIDE 2 MG: 1 INJECTION, SOLUTION INTRAMUSCULAR; INTRAVENOUS; SUBCUTANEOUS at 14:52

## 2017-05-04 RX ADMIN — KETOROLAC TROMETHAMINE 30 MG: 30 INJECTION, SOLUTION INTRAMUSCULAR at 13:40

## 2017-05-04 RX ADMIN — ENOXAPARIN SODIUM 40 MG: 40 INJECTION SUBCUTANEOUS at 13:40

## 2017-05-04 RX ADMIN — METRONIDAZOLE 500 MG: 500 INJECTION, SOLUTION INTRAVENOUS at 01:57

## 2017-05-04 RX ADMIN — HYDROMORPHONE HYDROCHLORIDE 2 MG: 1 INJECTION, SOLUTION INTRAMUSCULAR; INTRAVENOUS; SUBCUTANEOUS at 06:59

## 2017-05-04 RX ADMIN — VALSARTAN 160 MG: 160 TABLET ORAL at 09:51

## 2017-05-04 RX ADMIN — KETOROLAC TROMETHAMINE 30 MG: 30 INJECTION, SOLUTION INTRAMUSCULAR at 17:32

## 2017-05-04 RX ADMIN — LEVOFLOXACIN 750 MG: 5 INJECTION, SOLUTION INTRAVENOUS at 16:15

## 2017-05-04 RX ADMIN — HYDROMORPHONE HYDROCHLORIDE 2 MG: 1 INJECTION, SOLUTION INTRAMUSCULAR; INTRAVENOUS; SUBCUTANEOUS at 19:13

## 2017-05-04 NOTE — PROGRESS NOTES
Hospitalist Progress Note    NAME: Elizabeth Griffith   :  1965   MRN:  [id number]         Assessment / Plan:  Acute diverticulitis without abscess:  nausea improved, pain is better but still requiring IV medication   - CT A/P  with severe sigmoid diverticulitis without evidence of perforation or abscess. The findings appear to have progressed in comparison with the prior examination 6 months ago. - con't IV fluids, rate adjusted  - will try GI lite diet today  - con't IV levaquin, flagyl (given zosyn x 1 in ER)  - con't IV toradol - seems to be improving with antiinflammatory. Con't prn dilaudid. - normal colonoscopy after episode last year, does not need additional GI f/u at this time (saw Lori Mahmood) as she is planning surgical f/u for resection. Pt will need to be fully treated and wait at least 6 weeks for inflammation to improve before she would be candidate for surgical intervention. This has been discussed and she understands this POC. Hypertension, benign/essential:    - stopped HCTZ due to pancreatitis   - con't valsartan (cough with ACE)  - prn hydralazine  Acute pancreatitis: unclear etiology, resolved  - normal GB/ducts on CT as discussed above  - stopped HCTZ  - IV fluids as above  Tobacco abuse:  con't nicotine patch prn  Morbid obesity      Code Status: Full  Surrogate Decision Maker:   DVT Prophylaxis: lovenox     Subjective:     Chief Complaint / Reason for Physician Visit  \"I'm a little better\". Wants to try some solid food. Discussed with RN events overnight. Review of Systems:  Symptom Y/N Comments  Symptom Y/N Comments   Fever/Chills n   Chest Pain n    Poor Appetite n   Edema n    Cough n   Abdominal Pain y    Sputum n   Joint Pain     SOB/MORAES n   Pruritis/Rash     Nausea/vomit    Tolerating PT/OT     Diarrhea    Tolerating Diet     Constipation    Other       Could NOT obtain due to:      Objective:     VITALS:   Last 24hrs VS reviewed since prior progress note. Most recent are:  Patient Vitals for the past 24 hrs:   Temp Pulse Resp BP SpO2   05/04/17 0951 - 73 - (!) 152/94 -   05/04/17 0700 - - - - 93 %   05/04/17 0641 98.3 °F (36.8 °C) 85 16 137/84 (!) 89 %   05/03/17 2206 - - - 138/85 -   05/03/17 2055 97.9 °F (36.6 °C) 83 18 (!) 149/95 93 %   05/03/17 1329 97.9 °F (36.6 °C) 98 20 138/81 91 %     No intake or output data in the 24 hours ending 05/04/17 1021     PHYSICAL EXAM:  General: Obese. Alert, cooperative, no acute distress    EENT:  EOMI. Anicteric sclerae. MMM  Resp:  CTA bilaterally, no wheezing or rales. No accessory muscle use  CV:  Regular  rhythm,  No edema  GI:  Soft, mildly distended, mildly tender.  +Bowel sounds  Neurologic:  Alert and oriented X 3, normal speech,   Psych:   Good insight. Not anxious nor agitated  Skin:  No rashes. No jaundice    Reviewed most current lab test results and cultures  YES  Reviewed most current radiology test results   YES  Review and summation of old records today    NO  Reviewed patient's current orders and MAR    YES  PMH/ reviewed - no change compared to H&P  ________________________________________________________________________  Care Plan discussed with:    Comments   Patient x    Family      RN x    Care Manager     Consultant                        Multidiciplinary team rounds were held today with , nursing, pharmacist and clinical coordinator. Patient's plan of care was discussed; medications were reviewed and discharge planning was addressed.      ________________________________________________________________________  Total NON critical care TIME:  25 Minutes    Total CRITICAL CARE TIME Spent:   Minutes non procedure based      Comments   >50% of visit spent in counseling and coordination of care x    ________________________________________________________________________  Dulce Hoover MD     Procedures: see electronic medical records for all procedures/Xrays and details which were not copied into this note but were reviewed prior to creation of Plan. LABS:  I reviewed today's most current labs and imaging studies.   Pertinent labs include:  Recent Labs      05/03/17 0413  05/02/17   0605   WBC  11.0  8.9   HGB  11.9  11.7   HCT  37.0  35.8   PLT  340  333     Recent Labs      05/03/17 0413 05/02/17   0605   NA  141  141   K  3.6  3.6   CL  105  107   CO2  30  25   GLU  93  96   BUN  3*  6   CREA  0.51*  0.54*   CA  8.3*  8.3*   ALB   --   2.5*   TBILI   --   0.2   SGOT   --   7*   ALT   --   12       Signed: Holly Interiano MD

## 2017-05-04 NOTE — PROGRESS NOTES
Problem: Pain  Goal: *Control of Pain  Outcome: Progressing Towards Goal  Pain has pain medicine ordered every four hours.

## 2017-05-04 NOTE — PROGRESS NOTES
Spiritual Care Partner Volunteer visited patient in oncology on 5/4/17. Documented by:  Rev. Ary Galeas.  Ben Naidu MA, [facility]    Lead  [employer]

## 2017-05-04 NOTE — PROGRESS NOTES
Oncology Nursing Communication Tool      7:17 PM  5/4/2017     Bedside and Verbal shift change report given to Shanda Saint, RN (incoming nurse) by Brett Montoya RN (outgoing nurse) on Kathie Purdy a 46 y.o. female who was admitted on 5/1/2017  8:57 AM. Report included the following information SBAR, Kardex, Intake/Output, MAR and Recent Results. Significant changes during shift: none      Issues for physician to address: none            Code Status: Full Code     Infections: No current active infections     Allergies: Lisinopril     Current diet: DIET GI LITE (POST SURGICAL)       Pain Controlled [x] yes [] no   Bowel Movement [x] yes [] no   Last Bowel Movement (date)   5/4/17            Vital Signs:   Patient Vitals for the past 12 hrs:   Temp Pulse Resp BP SpO2   05/04/17 1457 98.2 °F (36.8 °C) 90 18 142/89 91 %   05/04/17 0951 - 73 - (!) 152/94 -      Intake & Output:   No intake or output data in the 24 hours ending 05/04/17 1917   Laboratory Results:   No results found for this or any previous visit (from the past 12 hour(s)). Opportunity for questions and clarifications were given to the incoming nurse. Patient's bed is in low position, side rails x2, door open PRN, call bell within reach and patient not in distress.       Brett Montoya RN

## 2017-05-04 NOTE — PROGRESS NOTES
Oncology Nursing Communication Tool      9:26 PM  5/3/2017     Bedside and Verbal shift change report given to Gordo Mendez RN (incoming nurse) by Patel Barba RN (outgoing nurse) on Ramona Crow a 46 y.o. female who was admitted on 5/1/2017  8:57 AM. Report included the following information SBAR, Kardex, Intake/Output, MAR, Accordion and Recent Results. Significant changes during shift: Nausea improved, full shower, PO intake improved      Issues for physician to address: Discharge disposition            Code Status: Full Code     Infections: No current active infections     Allergies: Lisinopril     Current diet: DIET FULL LIQUID       Pain Meds [x] yes [] no   Bowel Movement [x] yes [] no   Last Bowel Movement (date)     5/3/17            Vital Signs:   Patient Vitals for the past 12 hrs:   Temp Pulse Resp BP SpO2   05/03/17 2055 97.9 °F (36.6 °C) 83 18 (!) 149/95 93 %   05/03/17 1329 97.9 °F (36.6 °C) 98 20 138/81 91 %      Intake & Output:     Intake/Output Summary (Last 24 hours) at 05/03/17 2126  Last data filed at 05/03/17 0630   Gross per 24 hour   Intake          2031.66 ml   Output              700 ml   Net          1331.66 ml      Laboratory Results:   No results found for this or any previous visit (from the past 12 hour(s)). Opportunity for questions and clarifications were given to the incoming nurse. Patient's bed is in low position, side rails x2, door open PRN, call bell within reach and patient not in distress.       Patel Barba RN

## 2017-05-05 PROCEDURE — 74011250637 HC RX REV CODE- 250/637: Performed by: INTERNAL MEDICINE

## 2017-05-05 PROCEDURE — 65270000015 HC RM PRIVATE ONCOLOGY

## 2017-05-05 PROCEDURE — 74011000250 HC RX REV CODE- 250: Performed by: INTERNAL MEDICINE

## 2017-05-05 PROCEDURE — 74011250636 HC RX REV CODE- 250/636: Performed by: EMERGENCY MEDICINE

## 2017-05-05 PROCEDURE — 74011250636 HC RX REV CODE- 250/636: Performed by: INTERNAL MEDICINE

## 2017-05-05 RX ORDER — METRONIDAZOLE 250 MG/1
500 TABLET ORAL 3 TIMES DAILY
Status: DISCONTINUED | OUTPATIENT
Start: 2017-05-05 | End: 2017-05-06 | Stop reason: HOSPADM

## 2017-05-05 RX ORDER — HYDROMORPHONE HYDROCHLORIDE 1 MG/ML
0.5 INJECTION, SOLUTION INTRAMUSCULAR; INTRAVENOUS; SUBCUTANEOUS
Status: DISCONTINUED | OUTPATIENT
Start: 2017-05-05 | End: 2017-05-06 | Stop reason: HOSPADM

## 2017-05-05 RX ORDER — OXYCODONE AND ACETAMINOPHEN 5; 325 MG/1; MG/1
1 TABLET ORAL
Status: DISCONTINUED | OUTPATIENT
Start: 2017-05-05 | End: 2017-05-06 | Stop reason: HOSPADM

## 2017-05-05 RX ORDER — LEVOFLOXACIN 750 MG/1
750 TABLET ORAL
Status: DISCONTINUED | OUTPATIENT
Start: 2017-05-05 | End: 2017-05-06 | Stop reason: HOSPADM

## 2017-05-05 RX ORDER — OXYCODONE AND ACETAMINOPHEN 10; 325 MG/1; MG/1
1 TABLET ORAL
Status: DISCONTINUED | OUTPATIENT
Start: 2017-05-05 | End: 2017-05-06 | Stop reason: HOSPADM

## 2017-05-05 RX ADMIN — ENOXAPARIN SODIUM 40 MG: 40 INJECTION SUBCUTANEOUS at 12:40

## 2017-05-05 RX ADMIN — Medication 10 ML: at 21:26

## 2017-05-05 RX ADMIN — KETOROLAC TROMETHAMINE 30 MG: 30 INJECTION, SOLUTION INTRAMUSCULAR at 00:24

## 2017-05-05 RX ADMIN — HYDROMORPHONE HYDROCHLORIDE 1 MG: 1 INJECTION, SOLUTION INTRAMUSCULAR; INTRAVENOUS; SUBCUTANEOUS at 08:16

## 2017-05-05 RX ADMIN — HYDROMORPHONE HYDROCHLORIDE 1 MG: 1 INJECTION, SOLUTION INTRAMUSCULAR; INTRAVENOUS; SUBCUTANEOUS at 03:35

## 2017-05-05 RX ADMIN — HYDROMORPHONE HYDROCHLORIDE 0.5 MG: 1 INJECTION, SOLUTION INTRAMUSCULAR; INTRAVENOUS; SUBCUTANEOUS at 00:05

## 2017-05-05 RX ADMIN — HYDROMORPHONE HYDROCHLORIDE 0.5 MG: 1 INJECTION, SOLUTION INTRAMUSCULAR; INTRAVENOUS; SUBCUTANEOUS at 17:24

## 2017-05-05 RX ADMIN — METRONIDAZOLE 500 MG: 250 TABLET ORAL at 16:27

## 2017-05-05 RX ADMIN — METRONIDAZOLE 500 MG: 500 INJECTION, SOLUTION INTRAVENOUS at 09:40

## 2017-05-05 RX ADMIN — OXYCODONE HYDROCHLORIDE AND ACETAMINOPHEN 1 TABLET: 10; 325 TABLET ORAL at 12:39

## 2017-05-05 RX ADMIN — VALSARTAN 160 MG: 160 TABLET ORAL at 09:38

## 2017-05-05 RX ADMIN — LEVOFLOXACIN 750 MG: 750 TABLET, FILM COATED ORAL at 16:27

## 2017-05-05 RX ADMIN — METRONIDAZOLE 500 MG: 500 INJECTION, SOLUTION INTRAVENOUS at 01:50

## 2017-05-05 RX ADMIN — Medication 10 ML: at 06:47

## 2017-05-05 RX ADMIN — METRONIDAZOLE 500 MG: 250 TABLET ORAL at 21:25

## 2017-05-05 RX ADMIN — Medication 10 ML: at 15:03

## 2017-05-05 RX ADMIN — SODIUM CHLORIDE, SODIUM LACTATE, POTASSIUM CHLORIDE, AND CALCIUM CHLORIDE 100 ML/HR: 600; 310; 30; 20 INJECTION, SOLUTION INTRAVENOUS at 00:24

## 2017-05-05 RX ADMIN — HYDROMORPHONE HYDROCHLORIDE 0.5 MG: 1 INJECTION, SOLUTION INTRAMUSCULAR; INTRAVENOUS; SUBCUTANEOUS at 22:58

## 2017-05-05 RX ADMIN — OXYCODONE HYDROCHLORIDE AND ACETAMINOPHEN 1 TABLET: 10; 325 TABLET ORAL at 16:27

## 2017-05-05 RX ADMIN — KETOROLAC TROMETHAMINE 30 MG: 30 INJECTION, SOLUTION INTRAMUSCULAR at 06:46

## 2017-05-05 RX ADMIN — OXYCODONE HYDROCHLORIDE AND ACETAMINOPHEN 1 TABLET: 10; 325 TABLET ORAL at 20:13

## 2017-05-05 NOTE — PROGRESS NOTES
Oncology Nursing Communication Tool      6:38 PM  5/5/2017     Bedside and Verbal shift change report given to Matt Duarte RN (incoming nurse) by Joel Soto (outgoing nurse) on Emmett Coad a 46 y.o. female who was admitted on 5/1/2017  8:57 AM. Report included the following information SBAR, Kardex, Intake/Output, MAR and Accordion. Significant changes during shift: Pt meds switched to oral to prepare for discharge. Percocet did not fully take away pain at last dose so dilaudid given to help. Issues for physician to address: Same as above. Code Status: Full Code     Infections: No current active infections     Allergies: Lisinopril     Current diet: DIET GI LITE (POST SURGICAL)       Pain Controlled [x] yes [] no   Bowel Movement [x] yes [] no   Last Bowel Movement (date) 5/2/17            Vital Signs:   Patient Vitals for the past 12 hrs:   Temp Pulse Resp BP SpO2   05/05/17 1330 98.3 °F (36.8 °C) 90 18 155/83 93 %   05/05/17 1020 - 86 - (!) 168/95 -   05/05/17 0938 - 81 - (!) 166/91 -      Intake & Output:   No intake or output data in the 24 hours ending 05/05/17 1838   Laboratory Results:   No results found for this or any previous visit (from the past 12 hour(s)). Opportunity for questions and clarifications were given to the incoming nurse. Patient's bed is in low position, side rails x2, door open PRN, call bell within reach and patient not in distress.       Joel Soto

## 2017-05-05 NOTE — PROGRESS NOTES
Oncology Nursing Communication Tool      8:05 AM  2017     Bedside and Verbal shift change report given to Juan J Lopez RN (incoming nurse) by Luis Bustamante RN (outgoing nurse) on Adriane Avni a 46 y.o. female who was admitted on 2017  8:57 AM. Report included the following information SBAR, Kardex, Intake/Output, Recent Results and Med Rec Status. Significant changes during shift: elevated BP      Issues for physician to address: none               Opportunity for questions and clarifications were given to the incoming nurse. Patient's bed is in low position, side rails x2, door open PRN, call bell within reach and patient not in distress.       Luis Bustamante RN

## 2017-05-05 NOTE — PROGRESS NOTES
Hospitalist Progress Note    NAME: Kathie Purdy   :  1965   MRN:  [id number]         Assessment / Plan:  Acute diverticulitis without abscess:  pain continues to slowly improve, tolerated GI lite diet yesterday   - CT A/P  with severe sigmoid diverticulitis without evidence of perforation or abscess. The findings appear to have progressed in comparison with the prior examination 6 months ago. - stop IV fluids  - con't GI lite   - change IV levaquin, flagyl to po  - transitioning IV dilaudid to po percocet  Hypertension, benign/essential:    - stopped HCTZ due to pancreatitis   - con't valsartan (cough with ACE)  - prn hydralazine  Acute pancreatitis: unclear etiology, resolved  - normal GB/ducts on CT as discussed above  - stopped HCTZ  Tobacco abuse:  con't nicotine patch prn  Morbid obesity      Code Status: Full  Surrogate Decision Maker:   DVT Prophylaxis: lovenox     Subjective:     Chief Complaint / Reason for Physician Visit  \"I'm doing better but still having pain\". Discussed with RN events overnight. Review of Systems:  Symptom Y/N Comments  Symptom Y/N Comments   Fever/Chills n   Chest Pain n    Poor Appetite n   Edema n    Cough n   Abdominal Pain n    Sputum n   Joint Pain     SOB/MORAES n   Pruritis/Rash     Nausea/vomit    Tolerating PT/OT     Diarrhea    Tolerating Diet     Constipation    Other       Could NOT obtain due to:      Objective:     VITALS:   Last 24hrs VS reviewed since prior progress note. Most recent are:  Patient Vitals for the past 24 hrs:   Temp Pulse Resp BP SpO2   17 0534 97.7 °F (36.5 °C) 92 18 (!) 151/92 93 %   17 0030 98.4 °F (36.9 °C) 90 16 138/76 95 %   17 2103 97.7 °F (36.5 °C) 77 16 (!) 179/97 98 %   17 1457 98.2 °F (36.8 °C) 90 18 142/89 91 %   17 0951 - 73 - (!) 152/94 -     No intake or output data in the 24 hours ending 17 0914     PHYSICAL EXAM:  General: Obese.  Alert, cooperative, no acute distress    EENT:  EOMI. Anicteric sclerae. MMM  Resp:  CTA bilaterally, no wheezing or rales. No accessory muscle use  CV:  Regular rhythm,  No edema  GI:  Soft, moderately distended, mild LQ tenderness.  +Bowel sounds  Neurologic:  Alert and oriented X 3, normal speech,   Psych:   Good insight. Not anxious nor agitated  Skin:  No rashes. No jaundice    Reviewed most current lab test results and cultures  YES  Reviewed most current radiology test results   YES  Review and summation of old records today    NO  Reviewed patient's current orders and MAR    YES  PMH/SH reviewed - no change compared to H&P  ________________________________________________________________________  Care Plan discussed with:    Comments   Patient x    Family      RN     Care Manager     Consultant                        Multidiciplinary team rounds were held today with , nursing, pharmacist and clinical coordinator. Patient's plan of care was discussed; medications were reviewed and discharge planning was addressed. ________________________________________________________________________  Total NON critical care TIME:  25 Minutes    Total CRITICAL CARE TIME Spent:   Minutes non procedure based      Comments   >50% of visit spent in counseling and coordination of care x    ________________________________________________________________________  Jolene Narayan MD     Procedures: see electronic medical records for all procedures/Xrays and details which were not copied into this note but were reviewed prior to creation of Plan. LABS:  I reviewed today's most current labs and imaging studies.   Pertinent labs include:  Recent Labs      05/03/17   0413   WBC  11.0   HGB  11.9   HCT  37.0   PLT  340     Recent Labs      05/03/17   0413   NA  141   K  3.6   CL  105   CO2  30   GLU  93   BUN  3*   CREA  0.51*   CA  8.3*       Signed: Jolene Narayan MD

## 2017-05-06 VITALS
HEIGHT: 62 IN | SYSTOLIC BLOOD PRESSURE: 151 MMHG | OXYGEN SATURATION: 96 % | DIASTOLIC BLOOD PRESSURE: 89 MMHG | HEART RATE: 87 BPM | BODY MASS INDEX: 34.61 KG/M2 | WEIGHT: 188.05 LBS | RESPIRATION RATE: 16 BRPM | TEMPERATURE: 98.8 F

## 2017-05-06 PROCEDURE — 74011250637 HC RX REV CODE- 250/637: Performed by: INTERNAL MEDICINE

## 2017-05-06 PROCEDURE — 74011250636 HC RX REV CODE- 250/636: Performed by: INTERNAL MEDICINE

## 2017-05-06 PROCEDURE — 74011000250 HC RX REV CODE- 250: Performed by: HOSPITALIST

## 2017-05-06 PROCEDURE — 74011250636 HC RX REV CODE- 250/636: Performed by: HOSPITALIST

## 2017-05-06 RX ORDER — LEVOFLOXACIN 750 MG/1
750 TABLET ORAL
Qty: 14 TAB | Refills: 0 | Status: SHIPPED | OUTPATIENT
Start: 2017-05-06 | End: 2017-05-24

## 2017-05-06 RX ORDER — ONDANSETRON 4 MG/1
4 TABLET, ORALLY DISINTEGRATING ORAL
Qty: 15 TAB | Refills: 1 | Status: SHIPPED | OUTPATIENT
Start: 2017-05-06 | End: 2017-05-24

## 2017-05-06 RX ORDER — OXYCODONE AND ACETAMINOPHEN 5; 325 MG/1; MG/1
1-2 TABLET ORAL
Qty: 40 TAB | Refills: 0 | Status: SHIPPED | OUTPATIENT
Start: 2017-05-06 | End: 2017-05-24

## 2017-05-06 RX ORDER — IBUPROFEN 200 MG
1 TABLET ORAL EVERY 24 HOURS
Qty: 30 PATCH | Refills: 1 | Status: ON HOLD | OUTPATIENT
Start: 2017-05-06 | End: 2017-05-23

## 2017-05-06 RX ORDER — VALSARTAN 160 MG/1
160 TABLET ORAL DAILY
Qty: 30 TAB | Refills: 2 | Status: SHIPPED | OUTPATIENT
Start: 2017-05-06 | End: 2017-06-05

## 2017-05-06 RX ORDER — METRONIDAZOLE 500 MG/1
500 TABLET ORAL 3 TIMES DAILY
Qty: 42 TAB | Refills: 0 | Status: SHIPPED | OUTPATIENT
Start: 2017-05-06 | End: 2017-05-24

## 2017-05-06 RX ORDER — POLYETHYLENE GLYCOL 3350 17 G/17G
17 POWDER, FOR SOLUTION ORAL
Qty: 30 EACH | Refills: 1 | Status: SHIPPED | OUTPATIENT
Start: 2017-05-06 | End: 2017-09-13

## 2017-05-06 RX ORDER — DOCUSATE SODIUM 100 MG/1
100 CAPSULE, LIQUID FILLED ORAL 2 TIMES DAILY
Qty: 60 CAP | Refills: 1 | Status: ON HOLD | OUTPATIENT
Start: 2017-05-06 | End: 2017-05-17 | Stop reason: DRUGHIGH

## 2017-05-06 RX ADMIN — OXYCODONE HYDROCHLORIDE AND ACETAMINOPHEN 1 TABLET: 10; 325 TABLET ORAL at 08:05

## 2017-05-06 RX ADMIN — OXYCODONE HYDROCHLORIDE AND ACETAMINOPHEN 1 TABLET: 10; 325 TABLET ORAL at 01:17

## 2017-05-06 RX ADMIN — METRONIDAZOLE 500 MG: 250 TABLET ORAL at 11:00

## 2017-05-06 RX ADMIN — SODIUM CHLORIDE 5 MG: 9 INJECTION INTRAMUSCULAR; INTRAVENOUS; SUBCUTANEOUS at 01:15

## 2017-05-06 RX ADMIN — HYDROMORPHONE HYDROCHLORIDE 0.5 MG: 1 INJECTION, SOLUTION INTRAMUSCULAR; INTRAVENOUS; SUBCUTANEOUS at 06:14

## 2017-05-06 RX ADMIN — VALSARTAN 160 MG: 160 TABLET ORAL at 11:00

## 2017-05-06 NOTE — DISCHARGE INSTRUCTIONS
HOSPITALIST DISCHARGE INSTRUCTIONS    NAME: Shauna Broussard   :  1965   MRN:  [id number]     Date/Time:  2017 9:48 AM    ADMIT DATE: 2017   DISCHARGE DATE: 2017     Attending Physician: Nirav Cano MD    DISCHARGE DIAGNOSIS:  Acute diverticulitis without abscess  Hypertension  Acute pancreatitis, resolved  Tobacco use    MEDICATIONS:  See above    · It is important that you take the medication exactly as they are prescribed. · Keep your medication in the bottles provided by the pharmacist and keep a list of the medication names, dosages, and times to be taken in your wallet. · Do not take other medications without consulting your doctor. Pain Management: per above medications    What to do at Home    Recommended diet:  Regular Diet, make sure that you are drinking plenty of water    Recommended activity: Activity as tolerated    If you have questions regarding the hospital related prescriptions or hospital related issues please call [facility] at . You can always direct your questions to your primary care doctor if you are unable to reach your hospital physician; your PCP works as an extension of your hospital doctor just like your hospital doctor is an extension of your PCP for your time at [facility]. If you experience any of the following symptoms then please call your primary care physician or return to the emergency room if you cannot get hold of your doctor:  Fever, chills, nausea, vomiting, diarrhea, change in mentation, falling, bleeding, shortness of breath    Additional Instructions:    Try to avoid constipation (goal to have a bowel movement every 1-2 days). See instructions below. I recommend that you take an over-the-counter probiotic (such as Deven Merlin, or store generic) to see if this helps with your overall bowel health. Bring these papers with you to your follow up appointments.  The papers will help your doctors be sure to continue the care plan from the hospital.              Information obtained by :  I understand that if any problems occur once I am at home I am to contact my physician. I understand and acknowledge receipt of the instructions indicated above. Physician's or R.N.'s Signature                                                                  Date/Time                                                                                                                                              Patient or Representative Signature                                                          Date/Time       Constipation: Care Instructions  Your Care Instructions  Constipation means that you have a hard time passing stools (bowel movements). People pass stools from 3 times a day to once every 3 days. What is normal for you may be different. Constipation may occur with pain in the rectum and cramping. The pain may get worse when you try to pass stools. Sometimes there are small amounts of bright red blood on toilet paper or the surface of stools. This is because of enlarged veins near the rectum (hemorrhoids). A few changes in your diet and lifestyle may help you avoid ongoing constipation. Your doctor may also prescribe medicine to help loosen your stool. Some medicines can cause constipation. These include pain medicines and antidepressants. Tell your doctor about all the medicines you take. Your doctor may want to make a medicine change to ease your symptoms. Follow-up care is a key part of your treatment and safety. Be sure to make and go to all appointments, and call your doctor if you are having problems. It's also a good idea to know your test results and keep a list of the medicines you take. How can you care for yourself at home?   · Drink plenty of fluids, enough so that your urine is light yellow or clear like water. If you have kidney, heart, or liver disease and have to limit fluids, talk with your doctor before you increase the amount of fluids you drink. · Include high-fiber foods in your diet each day. These include fruits, vegetables, beans, and whole grains. · Get at least 30 minutes of exercise on most days of the week. Walking is a good choice. You also may want to do other activities, such as running, swimming, cycling, or playing tennis or team sports. · Take a fiber supplement, such as Citrucel or Metamucil, every day. Read and follow all instructions on the label. · Schedule time each day for a bowel movement. A daily routine may help. Take your time having your bowel movement. · Support your feet with a small step stool when you sit on the toilet. This helps flex your hips and places your pelvis in a squatting position. · Your doctor may recommend an over-the-counter laxative to relieve your constipation. Examples are Milk of Magnesia and MiraLax. Read and follow all instructions on the label. Do not use laxatives on a long-term basis. When should you call for help? Call your doctor now or seek immediate medical care if:  · You have new or worse belly pain. · You have new or worse nausea or vomiting. · You have blood in your stools. Watch closely for changes in your health, and be sure to contact your doctor if:  · Your constipation is getting worse. · You do not get better as expected. Where can you learn more? Go to http://ruby-zenaida.info/. Enter 21 [phone] in the search box to learn more about \"Constipation: Care Instructions. \"  Current as of: May 27, 2016  Content Version: 11.2  © 4322-0907 [employer]. Care instructions adapted under license by [employer] (which disclaims liability or warranty for this information).  If you have questions about a medical condition or this instruction, always ask your healthcare professional. [place] [address] any warranty or liability for your use of this information.

## 2017-05-06 NOTE — PROGRESS NOTES
Discharge instructions reviewed and received along with prescriptions,verbalizes understranding. Condition stable. Discharge folder given to patient.

## 2017-05-06 NOTE — DISCHARGE SUMMARY
Hospitalist Discharge Summary     Patient ID:  Bayron Loredo  [id number]  46 y.o.  1965    PCP on record: None    Admit date: 5/1/2017  Discharge date and time: 5/6/2017      DISCHARGE DIAGNOSIS:  Acute diverticulitis without abscess  Hypertension, benign/essential  Acute pancreatitis  Tobacco abuse  Morbid obesity      CONSULTATIONS:  None    Excerpted HPI from H&P of Fam Holly MD:  Irene Linares is a 46 y.o.  female who presents with above. Pt recently moved to [facility] so has no PCP. She continues to struggle with diverticulitis which has been a chronic issue. She was last admitted for this last year with findings of abscess on CT. Today, she complains again of lower abdominal pain which radiates to the left. She denies nausea and has vomited only once. She says that she has been constipated with very small hard stools. She denies fevers at home. No cough or SOB. No CP or lightheadedness. She has been seen in the ER over in [facility] twice in the last couple of weeks. She was treated with cipro/flagyl x 1 week and then returned with ongoing sx and was given more. Her pain has finally reached the point that she came here for inpatient treatment. She denies new edema or focal weakness.      We were asked to admit for work up and evaluation of the above problems. ______________________________________________________________________  DISCHARGE SUMMARY/HOSPITAL COURSE:  for full details see H&P, daily progress notes, labs, consult notes. Hospital course:  Acute diverticulitis without abscess:  CT A/P 5/1 with severe sigmoid diverticulitis without evidence of perforation or abscess. The findings appear to have progressed in comparison with the prior examination 6 months ago. Pt was given IV fluids and IV Abx initially. Her pain continued to slowly improve and diet was advanced.   She was tolerating all po medications with fair pain control on percocet at the time of discharge. She has been given copies of her CT A/P reports as well as a CD with her images from her last test to share with a surgeon as she plans to surgically address her recurrent sigmoid diverticulitis (at least 5 episodes). It has been discussed with her that she needs to wait 6 weeks for healing/reduction in inflammation before this could be surgically addressed. As she had a colonoscopy after her episode last year, I have not recommended that she have this repeated, but that she follow up with a surgeon as she plans. In the interim, she has been given Rx for 2 additional weeks of po levaquin and flagyl to complete a longer course of Abx given her h/o prolonged and complicated courses of diverticulitis. Hypertension, benign/essential:  stopped HCTZ due to pancreatitis. Pt should con't valsartan (cough with ACE). Acute pancreatitis: unclear etiology, resolved with supportive care. Pt had normal GB/ducts on CT as discussed above. I stopped her HCTZ in case this was contributing. Tobacco abuse:  con't nicotine patch, Pt asked for Rx on discharge and is planning on continuing working of quitting smoking. Morbid obesity    _______________________________________________________________________  Patient seen and examined by me on discharge day. Pertinent Findings:  Gen: awake, appropriate, NAD  HEENT: cl gianluca, no lesions  Chest: CTA bilaterally, no crackles or wheezes  Cv: RRR, no murmur, no edema  Abd: soft, NT, mildly distended, BS+, no mass  Neuro: CN intact  _______________________________________________________________________  DISCHARGE MEDICATIONS:   Current Discharge Medication List      START taking these medications    Details   levoFLOXacin (LEVAQUIN) 750 mg tablet Take 1 Tab by mouth Daily (before dinner) for 14 days. Qty: 14 Tab, Refills: 0      metroNIDAZOLE (FLAGYL) 500 mg tablet Take 1 Tab by mouth three (3) times daily for 14 days.   Qty: 42 Tab, Refills: 0      oxyCODONE-acetaminophen (PERCOCET) 5-325 mg per tablet Take 1-2 Tabs by mouth every six (6) hours as needed. Max Daily Amount: 8 Tabs. For pain  Qty: 40 Tab, Refills: 0      valsartan (DIOVAN) 160 mg tablet Take 1 Tab by mouth daily for 30 days. Qty: 30 Tab, Refills: 2      ondansetron (ZOFRAN ODT) 4 mg disintegrating tablet Take 1 Tab by mouth every eight (8) hours as needed for Nausea. Qty: 15 Tab, Refills: 1      nicotine (NICODERM CQ) 14 mg/24 hr patch 1 Patch by TransDERmal route every twenty-four (24) hours for 30 days. Qty: 30 Patch, Refills: 1      docusate sodium (COLACE) 100 mg capsule Take 1 Cap by mouth two (2) times a day for 30 days. Qty: 60 Cap, Refills: 1      polyethylene glycol (MIRALAX) 17 gram packet Take 1 Packet by mouth daily as needed. For constipation  Qty: 30 Each, Refills: 1         STOP taking these medications       hydrochlorothiazide (HYDRODIURIL) 12.5 mg tablet Comments:   Reason for Stopping:               My Recommended Diet, Activity, Wound Care, and follow-up labs are listed in the patient's Discharge Insturctions which I have personally completed and reviewed. _______________________________________________________________________  DISPOSITION:    Home with Family: x   Home with HH/PT/OT/RN:    SNF/LTC:    TESFAYE:    OTHER:        Condition at Discharge:  Stable  _______________________________________________________________________  Follow up with:   PCP : None  Follow-up Information     Follow up With Details Comments Contact Info    Pt met with CM while here and given list of Jeni Martinez providers as she is looking for a new PCP with her recent move.    None ([phone]) Patient stated that they have no PCP                Total time in minutes spent coordinating this discharge (includes going over instructions, follow-up, prescriptions, and preparing report for sign off to her PCP) :  35 minutes    Signed:  Siena Crow MD

## 2017-05-07 LAB
BACTERIA SPEC CULT: NORMAL
BACTERIA SPEC CULT: NORMAL
SERVICE CMNT-IMP: NORMAL
SERVICE CMNT-IMP: NORMAL

## 2017-05-14 ENCOUNTER — HOSPITAL ENCOUNTER (INPATIENT)
Age: 52
LOS: 9 days | Discharge: HOME HEALTH | DRG: 330 | End: 2017-05-23
Attending: EMERGENCY MEDICINE | Admitting: INTERNAL MEDICINE
Payer: COMMERCIAL

## 2017-05-14 ENCOUNTER — APPOINTMENT (OUTPATIENT)
Dept: CT IMAGING | Age: 52
DRG: 330 | End: 2017-05-14
Attending: EMERGENCY MEDICINE
Payer: COMMERCIAL

## 2017-05-14 DIAGNOSIS — K57.20 DIVERTICULITIS OF LARGE INTESTINE WITH PERFORATION WITHOUT BLEEDING: Primary | ICD-10-CM

## 2017-05-14 PROBLEM — K57.80 PERFORATED DIVERTICULUM: Status: ACTIVE | Noted: 2017-05-14

## 2017-05-14 LAB
ALBUMIN SERPL BCP-MCNC: 2.6 G/DL (ref 3.5–5)
ALBUMIN/GLOB SERPL: 0.6 {RATIO} (ref 1.1–2.2)
ALP SERPL-CCNC: 52 U/L (ref 45–117)
ALT SERPL-CCNC: 9 U/L (ref 12–78)
ANION GAP BLD CALC-SCNC: 7 MMOL/L (ref 5–15)
APPEARANCE UR: ABNORMAL
AST SERPL W P-5'-P-CCNC: 8 U/L (ref 15–37)
BACTERIA URNS QL MICRO: ABNORMAL /HPF
BASOPHILS # BLD AUTO: 0 K/UL (ref 0–0.1)
BASOPHILS # BLD: 0 % (ref 0–1)
BILIRUB SERPL-MCNC: 0.2 MG/DL (ref 0.2–1)
BILIRUB UR QL: NEGATIVE
BUN SERPL-MCNC: 10 MG/DL (ref 6–20)
BUN/CREAT SERPL: 20 (ref 12–20)
CALCIUM SERPL-MCNC: 8.3 MG/DL (ref 8.5–10.1)
CHLORIDE SERPL-SCNC: 103 MMOL/L (ref 97–108)
CO2 SERPL-SCNC: 28 MMOL/L (ref 21–32)
COLOR UR: ABNORMAL
CREAT SERPL-MCNC: 0.51 MG/DL (ref 0.55–1.02)
EOSINOPHIL # BLD: 0.1 K/UL (ref 0–0.4)
EOSINOPHIL NFR BLD: 0 % (ref 0–7)
EPITH CASTS URNS QL MICRO: ABNORMAL /LPF
ERYTHROCYTE [DISTWIDTH] IN BLOOD BY AUTOMATED COUNT: 13.8 % (ref 11.5–14.5)
GLOBULIN SER CALC-MCNC: 4.5 G/DL (ref 2–4)
GLUCOSE SERPL-MCNC: 137 MG/DL (ref 65–100)
GLUCOSE UR STRIP.AUTO-MCNC: NEGATIVE MG/DL
HCT VFR BLD AUTO: 37.4 % (ref 35–47)
HGB BLD-MCNC: 12.5 G/DL (ref 11.5–16)
HGB UR QL STRIP: ABNORMAL
KETONES UR QL STRIP.AUTO: NEGATIVE MG/DL
LACTATE SERPL-SCNC: 0.8 MMOL/L (ref 0.4–2)
LEUKOCYTE ESTERASE UR QL STRIP.AUTO: ABNORMAL
LIPASE SERPL-CCNC: 190 U/L (ref 73–393)
LYMPHOCYTES # BLD AUTO: 13 % (ref 12–49)
LYMPHOCYTES # BLD: 1.9 K/UL (ref 0.8–3.5)
MCH RBC QN AUTO: 29.4 PG (ref 26–34)
MCHC RBC AUTO-ENTMCNC: 33.4 G/DL (ref 30–36.5)
MCV RBC AUTO: 88 FL (ref 80–99)
MONOCYTES # BLD: 1.8 K/UL (ref 0–1)
MONOCYTES NFR BLD AUTO: 12 % (ref 5–13)
NEUTS SEG # BLD: 10.8 K/UL (ref 1.8–8)
NEUTS SEG NFR BLD AUTO: 75 % (ref 32–75)
NITRITE UR QL STRIP.AUTO: NEGATIVE
PH UR STRIP: 6 [PH] (ref 5–8)
PLATELET # BLD AUTO: 440 K/UL (ref 150–400)
POTASSIUM SERPL-SCNC: 3.4 MMOL/L (ref 3.5–5.1)
PROT SERPL-MCNC: 7.1 G/DL (ref 6.4–8.2)
PROT UR STRIP-MCNC: NEGATIVE MG/DL
RBC # BLD AUTO: 4.25 M/UL (ref 3.8–5.2)
RBC #/AREA URNS HPF: ABNORMAL /HPF (ref 0–5)
SODIUM SERPL-SCNC: 138 MMOL/L (ref 136–145)
SP GR UR REFRACTOMETRY: 1.01 (ref 1–1.03)
UA: UC IF INDICATED,UAUC: ABNORMAL
UROBILINOGEN UR QL STRIP.AUTO: 0.2 EU/DL (ref 0.2–1)
WBC # BLD AUTO: 14.7 K/UL (ref 3.6–11)
WBC URNS QL MICRO: >100 /HPF (ref 0–4)
YEAST URNS QL MICRO: PRESENT

## 2017-05-14 PROCEDURE — 74011250637 HC RX REV CODE- 250/637: Performed by: INTERNAL MEDICINE

## 2017-05-14 PROCEDURE — 96375 TX/PRO/DX INJ NEW DRUG ADDON: CPT

## 2017-05-14 PROCEDURE — 74011000258 HC RX REV CODE- 258: Performed by: EMERGENCY MEDICINE

## 2017-05-14 PROCEDURE — 74177 CT ABD & PELVIS W/CONTRAST: CPT

## 2017-05-14 PROCEDURE — 83690 ASSAY OF LIPASE: CPT | Performed by: EMERGENCY MEDICINE

## 2017-05-14 PROCEDURE — 99285 EMERGENCY DEPT VISIT HI MDM: CPT

## 2017-05-14 PROCEDURE — 85025 COMPLETE CBC W/AUTO DIFF WBC: CPT | Performed by: EMERGENCY MEDICINE

## 2017-05-14 PROCEDURE — 83605 ASSAY OF LACTIC ACID: CPT | Performed by: EMERGENCY MEDICINE

## 2017-05-14 PROCEDURE — 65660000000 HC RM CCU STEPDOWN

## 2017-05-14 PROCEDURE — 96376 TX/PRO/DX INJ SAME DRUG ADON: CPT

## 2017-05-14 PROCEDURE — 80053 COMPREHEN METABOLIC PANEL: CPT | Performed by: EMERGENCY MEDICINE

## 2017-05-14 PROCEDURE — 81001 URINALYSIS AUTO W/SCOPE: CPT | Performed by: EMERGENCY MEDICINE

## 2017-05-14 PROCEDURE — 87086 URINE CULTURE/COLONY COUNT: CPT | Performed by: EMERGENCY MEDICINE

## 2017-05-14 PROCEDURE — 74011250636 HC RX REV CODE- 250/636: Performed by: EMERGENCY MEDICINE

## 2017-05-14 PROCEDURE — 74011636320 HC RX REV CODE- 636/320: Performed by: EMERGENCY MEDICINE

## 2017-05-14 PROCEDURE — 36415 COLL VENOUS BLD VENIPUNCTURE: CPT | Performed by: EMERGENCY MEDICINE

## 2017-05-14 PROCEDURE — 96365 THER/PROPH/DIAG IV INF INIT: CPT

## 2017-05-14 PROCEDURE — 74011000258 HC RX REV CODE- 258: Performed by: INTERNAL MEDICINE

## 2017-05-14 PROCEDURE — 74011250636 HC RX REV CODE- 250/636: Performed by: INTERNAL MEDICINE

## 2017-05-14 RX ORDER — SODIUM CHLORIDE 0.9 % (FLUSH) 0.9 %
5-10 SYRINGE (ML) INJECTION EVERY 8 HOURS
Status: DISCONTINUED | OUTPATIENT
Start: 2017-05-14 | End: 2017-05-17 | Stop reason: SDUPTHER

## 2017-05-14 RX ORDER — VALSARTAN 160 MG/1
160 TABLET ORAL DAILY
Status: DISCONTINUED | OUTPATIENT
Start: 2017-05-15 | End: 2017-05-24 | Stop reason: HOSPADM

## 2017-05-14 RX ORDER — POTASSIUM CHLORIDE, DEXTROSE MONOHYDRATE AND SODIUM CHLORIDE 300; 5; 900 MG/100ML; G/100ML; MG/100ML
INJECTION, SOLUTION INTRAVENOUS CONTINUOUS
Status: DISCONTINUED | OUTPATIENT
Start: 2017-05-14 | End: 2017-05-21

## 2017-05-14 RX ORDER — OXYCODONE AND ACETAMINOPHEN 5; 325 MG/1; MG/1
1-2 TABLET ORAL
Status: DISCONTINUED | OUTPATIENT
Start: 2017-05-14 | End: 2017-05-17 | Stop reason: SDUPTHER

## 2017-05-14 RX ORDER — OXYCODONE AND ACETAMINOPHEN 5; 325 MG/1; MG/1
1 TABLET ORAL
Status: DISCONTINUED | OUTPATIENT
Start: 2017-05-14 | End: 2017-05-14

## 2017-05-14 RX ORDER — ONDANSETRON 2 MG/ML
4 INJECTION INTRAMUSCULAR; INTRAVENOUS
Status: DISCONTINUED | OUTPATIENT
Start: 2017-05-14 | End: 2017-05-17 | Stop reason: SDUPTHER

## 2017-05-14 RX ORDER — ENOXAPARIN SODIUM 100 MG/ML
40 INJECTION SUBCUTANEOUS EVERY 24 HOURS
Status: DISCONTINUED | OUTPATIENT
Start: 2017-05-15 | End: 2017-05-15

## 2017-05-14 RX ORDER — SODIUM CHLORIDE 0.9 % (FLUSH) 0.9 %
5-10 SYRINGE (ML) INJECTION AS NEEDED
Status: DISCONTINUED | OUTPATIENT
Start: 2017-05-14 | End: 2017-05-17 | Stop reason: SDUPTHER

## 2017-05-14 RX ORDER — HYDROMORPHONE HYDROCHLORIDE 1 MG/ML
0.5 INJECTION, SOLUTION INTRAMUSCULAR; INTRAVENOUS; SUBCUTANEOUS
Status: DISCONTINUED | OUTPATIENT
Start: 2017-05-14 | End: 2017-05-17 | Stop reason: SDUPTHER

## 2017-05-14 RX ORDER — NALOXONE HYDROCHLORIDE 0.4 MG/ML
0.4 INJECTION, SOLUTION INTRAMUSCULAR; INTRAVENOUS; SUBCUTANEOUS AS NEEDED
Status: DISCONTINUED | OUTPATIENT
Start: 2017-05-14 | End: 2017-05-17 | Stop reason: SDUPTHER

## 2017-05-14 RX ORDER — SODIUM CHLORIDE 0.9 % (FLUSH) 0.9 %
10 SYRINGE (ML) INJECTION
Status: COMPLETED | OUTPATIENT
Start: 2017-05-14 | End: 2017-05-14

## 2017-05-14 RX ORDER — SODIUM CHLORIDE 9 MG/ML
50 INJECTION, SOLUTION INTRAVENOUS
Status: COMPLETED | OUTPATIENT
Start: 2017-05-14 | End: 2017-05-14

## 2017-05-14 RX ORDER — HYDROMORPHONE HYDROCHLORIDE 1 MG/ML
1 INJECTION, SOLUTION INTRAMUSCULAR; INTRAVENOUS; SUBCUTANEOUS ONCE
Status: COMPLETED | OUTPATIENT
Start: 2017-05-14 | End: 2017-05-14

## 2017-05-14 RX ORDER — HYDROMORPHONE HYDROCHLORIDE 1 MG/ML
1 INJECTION, SOLUTION INTRAMUSCULAR; INTRAVENOUS; SUBCUTANEOUS
Status: COMPLETED | OUTPATIENT
Start: 2017-05-14 | End: 2017-05-14

## 2017-05-14 RX ORDER — METRONIDAZOLE 500 MG/100ML
500 INJECTION, SOLUTION INTRAVENOUS EVERY 8 HOURS
Status: DISCONTINUED | OUTPATIENT
Start: 2017-05-14 | End: 2017-05-17 | Stop reason: ALTCHOICE

## 2017-05-14 RX ORDER — KETOROLAC TROMETHAMINE 30 MG/ML
30 INJECTION, SOLUTION INTRAMUSCULAR; INTRAVENOUS
Status: COMPLETED | OUTPATIENT
Start: 2017-05-14 | End: 2017-05-14

## 2017-05-14 RX ORDER — ACETAMINOPHEN 325 MG/1
650 TABLET ORAL
Status: DISCONTINUED | OUTPATIENT
Start: 2017-05-14 | End: 2017-05-24 | Stop reason: HOSPADM

## 2017-05-14 RX ORDER — FACIAL-BODY WIPES
10 EACH TOPICAL DAILY PRN
Status: DISCONTINUED | OUTPATIENT
Start: 2017-05-14 | End: 2017-05-24 | Stop reason: HOSPADM

## 2017-05-14 RX ORDER — IBUPROFEN 200 MG
1 TABLET ORAL EVERY 24 HOURS
Status: DISCONTINUED | OUTPATIENT
Start: 2017-05-14 | End: 2017-05-19

## 2017-05-14 RX ADMIN — CEFTRIAXONE 1 G: 1 INJECTION, POWDER, FOR SOLUTION INTRAMUSCULAR; INTRAVENOUS at 23:37

## 2017-05-14 RX ADMIN — Medication 10 ML: at 16:39

## 2017-05-14 RX ADMIN — HYDROMORPHONE HYDROCHLORIDE 1 MG: 1 INJECTION, SOLUTION INTRAMUSCULAR; INTRAVENOUS; SUBCUTANEOUS at 18:55

## 2017-05-14 RX ADMIN — HYDROMORPHONE HYDROCHLORIDE 1 MG: 1 INJECTION, SOLUTION INTRAMUSCULAR; INTRAVENOUS; SUBCUTANEOUS at 15:36

## 2017-05-14 RX ADMIN — KETOROLAC TROMETHAMINE 30 MG: 30 INJECTION, SOLUTION INTRAMUSCULAR at 14:47

## 2017-05-14 RX ADMIN — HYDROMORPHONE HYDROCHLORIDE 0.5 MG: 1 INJECTION, SOLUTION INTRAMUSCULAR; INTRAVENOUS; SUBCUTANEOUS at 22:34

## 2017-05-14 RX ADMIN — DEXTROSE MONOHYDRATE, SODIUM CHLORIDE, AND POTASSIUM CHLORIDE: 50; 9; 2.98 INJECTION, SOLUTION INTRAVENOUS at 23:37

## 2017-05-14 RX ADMIN — SODIUM CHLORIDE 1000 ML: 900 INJECTION, SOLUTION INTRAVENOUS at 18:55

## 2017-05-14 RX ADMIN — ONDANSETRON HYDROCHLORIDE 4 MG: 2 INJECTION, SOLUTION INTRAMUSCULAR; INTRAVENOUS at 20:41

## 2017-05-14 RX ADMIN — SODIUM CHLORIDE 1000 ML: 900 INJECTION, SOLUTION INTRAVENOUS at 14:47

## 2017-05-14 RX ADMIN — Medication 10 ML: at 18:27

## 2017-05-14 RX ADMIN — OXYCODONE HYDROCHLORIDE AND ACETAMINOPHEN 1 TABLET: 5; 325 TABLET ORAL at 21:15

## 2017-05-14 RX ADMIN — IOPAMIDOL 100 ML: 755 INJECTION, SOLUTION INTRAVENOUS at 18:27

## 2017-05-14 RX ADMIN — SODIUM CHLORIDE 50 ML/HR: 900 INJECTION, SOLUTION INTRAVENOUS at 18:27

## 2017-05-14 RX ADMIN — DIATRIZOATE MEGLUMINE AND DIATRIZOATE SODIUM 30 ML: 600; 100 SOLUTION ORAL; RECTAL at 16:39

## 2017-05-14 RX ADMIN — PIPERACILLIN SODIUM,TAZOBACTAM SODIUM 3.38 G: 3; .375 INJECTION, POWDER, FOR SOLUTION INTRAVENOUS at 19:12

## 2017-05-14 NOTE — IP AVS SNAPSHOT
[address] [facility] 
[phone] Patient: Bob Sosa MRN: [id number] :1965 You are allergic to the following Allergen Reactions Lisinopril Cough Recent Documentation Height Weight BMI OB Status Smoking Status 1.575 m 85.3 kg 34.39 kg/m2 Postmenopausal Current Every Day Smoker Emergency Contacts Name Discharge Info Relation Home Work Mobile Fredy Kinney DISCHARGE CAREGIVER [3] Spouse [3]   [phone] About your hospitalization You were admitted on:  May 14, 2017 You last received care in the:  [facility] 2 GENERAL SURGERY You were discharged on:  May 24, 2017 Unit phone number:  [phone] Why you were hospitalized Your primary diagnosis was:  Not on File Your diagnoses also included:  Perforated Diverticulum Providers Seen During Your Hospitalizations Provider Role Specialty Primary office phone Sada Braden MD Attending Provider Emergency Medicine [phone] Sylvia Wadsworth MD Attending Provider Hospitalist [phone] Esteban Whitney MD Attending Provider Hospitalist [phone] Janna Leon MD Attending Provider Internal Medicine [phone] Your Primary Care Physician (PCP) Primary Care Physician Office Phone Office Fax NONE ** None ** ** None ** Follow-up Information Follow up With Details Comments Contact Info Ava Croft on 2017 Hospital follow up at 2:45 PM  [address] [address] 
[phone] [facility]  On 2017 This is your home health provider. If you do not hear from them 24 - 48 hours after discharge please contact them directly  [phone] Current Discharge Medication List  
  
START taking these medications Dose & Instructions Dispensing Information Comments Morning Noon Evening Bedtime  
 amoxicillin-clavulanate 875-125 mg per tablet Commonly known as:  AUGMENTIN Your last dose was: Your next dose is:    
   
   
 Dose:  1 Tab Take 1 Tab by mouth every twelve (12) hours for 5 days. Quantity:  10 Tab Refills:  0 CONTINUE these medications which have CHANGED Dose & Instructions Dispensing Information Comments Morning Noon Evening Bedtime  
 docusate sodium 100 mg capsule Commonly known as:  Precious Scarce What changed:  Another medication with the same name was removed. Continue taking this medication, and follow the directions you see here. Your last dose was: Your next dose is:    
   
   
 Dose:  100 mg Take 100 mg by mouth as needed for Constipation. Refills:  0  
     
   
   
   
  
 oxyCODONE-acetaminophen 5-325 mg per tablet Commonly known as:  PERCOCET What changed:   
- how much to take - when to take this 
- reasons to take this 
- additional instructions Your last dose was: Your next dose is:    
   
   
 Dose:  1 Tab Take 1 Tab by mouth every four (4) hours as needed for Pain. Max Daily Amount: 6 Tabs. Quantity:  60 Tab Refills:  0 CONTINUE these medications which have NOT CHANGED Dose & Instructions Dispensing Information Comments Morning Noon Evening Bedtime BENADRYL 25 mg capsule Generic drug:  diphenhydrAMINE Your last dose was: Your next dose is:    
   
   
 Dose:  25 mg Take 25 mg by mouth every six (6) hours as needed. Refills:  0  
     
   
   
   
  
 ibuprofen 200 mg tablet Commonly known as:  MOTRIN Your last dose was: Your next dose is:    
   
   
 Dose:  200 mg Take 200 mg by mouth every eight (8) hours as needed for Pain. Refills:  0  
     
   
   
   
  
 nicotine 14 mg/24 hr patch Commonly known as:  Della Justice Your last dose was: Your next dose is:    
   
   
 Dose:  1 Patch 1 Patch by TransDERmal route every twenty-four (24) hours for 30 days. Quantity:  30 Patch Refills:  0  
     
   
   
   
  
 polyethylene glycol 17 gram packet Commonly known as:  Ulysses Moeller Your last dose was: Your next dose is:    
   
   
 Dose:  17 g Take 1 Packet by mouth daily as needed. For constipation Quantity:  30 Each Refills:  1  
     
   
   
   
  
 valsartan 160 mg tablet Commonly known as:  DIOVAN Your last dose was: Your next dose is:    
   
   
 Dose:  160 mg Take 1 Tab by mouth daily for 30 days. Quantity:  30 Tab Refills:  2 STOP taking these medications   
 levoFLOXacin 750 mg tablet Commonly known as:  LEVAQUIN  
   
  
 metroNIDAZOLE 500 mg tablet Commonly known as:  FLAGYL  
   
  
 ondansetron 4 mg disintegrating tablet Commonly known as:  ZOFRAN ODT Where to Get Your Medications Information on where to get these meds will be given to you by the nurse or doctor. ! Ask your nurse or doctor about these medications  
  amoxicillin-clavulanate 875-125 mg per tablet  
 nicotine 14 mg/24 hr patch  
 oxyCODONE-acetaminophen 5-325 mg per tablet Discharge Instructions Fabian Roy MD, FACS Dennis NICOLE. Devika Jernigan MD, FACS Naif Adams MD, FACS Tyrone Lopez. Fidencio Quinones MD, FACS Gudelia LI. Jessica Adhikari MD, FACS Osiris Harmon. MD Deondre Walters MD 
 
[facility] Discharge Instructions for Colon Surgery Patients 1. Diagnosis: perforated diverticulitis 2. Restricted fiber diet. 3. Do not drive for 2 weeks or until after your next doctors appointment. 4. Leave steri-strips on incision. They may fall off on their own. 5. May take a shower. 6. No lifting any objects weighing more than 10 pounds. Do not do any housework, such as vacuuming, scrubbing, etc for at least a month. 7. When you get tired during the day, take naps, as you need your rest. 
8. Multiple bowel movements are normal each day for a while. 9. May walk as desired. May go up and down stairs. 10. Take pain medication as prescribed: (NO DRIVING WHILE ON PAIN MEDICATIONS). Percocet EVERY 4-6 HOURS AS NEEDED. Other Medications: see med rec Ostomy Supplies: provided by home health 11. See me in the office in 10-14 days. Call as soon as discharged for an appointment 21 [phone]. IF SURGERY INVOLVED AN OSTOMY BAG, PLEASE BRING YOUR SUPPLIES TO YOUR 1ST VISIT! 12.  Call the Exchange [phone], if you have any questions or problems after office hours. Discharge Orders None [employer] Announcement We are excited to announce that we are making your provider's discharge notes available to you in [employer]. You will see these notes when they are completed and signed by the physician that discharged you from your recent hospital stay. If you have any questions or concerns about any information you see in [employer], please call the Health Information Department where you were seen or reach out to your Primary Care Provider for more information about your plan of care. Introducing [facility]! Judyoma Cabral introduces [employer] patient portal. Now you can access parts of your medical record, email your doctor's office, and request medication refills online. 1. In your internet browser, go to [email or web address] 2. Click on the First Time User? Click Here link in the Sign In box. You will see the New Member Sign Up page. 3. Enter your [employer] Access Code exactly as it appears below. You will not need to use this code after youve completed the sign-up process. If you do not sign up before the expiration date, you must request a new code. · [employer] Access Code: [email or web address] Expires: 7/30/2017  9:07 AM 
 
 4. Enter the last four digits of your Social Security Number (xxxx) and Date of Birth (mm/dd/yyyy) as indicated and click Submit. You will be taken to the next sign-up page. 5. Create a [employer] ID. This will be your [employer] login ID and cannot be changed, so think of one that is secure and easy to remember. 6. Create a [employer] password. You can change your password at any time. 7. Enter your Password Reset Question and Answer. This can be used at a later time if you forget your password. 8. Enter your e-mail address. You will receive e-mail notification when new information is available in [address] 9. Click Sign Up. You can now view and download portions of your medical record. 10. Click the Download Summary menu link to download a portable copy of your medical information. If you have questions, please visit the Frequently Asked Questions section of the [employer] website. Remember, [employer] is NOT to be used for urgent needs. For medical emergencies, dial 911. Now available from your iPhone and Android! General Information Please provide this summary of care documentation to your next provider. Patient Signature:  ____________________________________________________________ Date:  ____________________________________________________________  
  
Flakita Baldwin Provider Signature:  ____________________________________________________________ Date:  ____________________________________________________________

## 2017-05-14 NOTE — IP AVS SNAPSHOT
Summary of Care Report The Summary of Care report has been created to help improve care coordination. Users with access to [employer] or [employer] (Web-based application) may access additional patient information including the Discharge Summary. If you are not currently a [employer] [facility] user and need more information, please call the number listed below in the [place] section and ask to be connected with Medical Records. Facility Information Name Address Phone [address] [address] [phone] Patient Information Patient Name Sex RUTHANN Wade ([id number]) Female 1965 Discharge Information Admitting Provider Service Area Unit Osvaldo Valencia MD / [facility] 2 General Surgery / [phone] Discharge Provider Discharge Date/Time Discharge Disposition Destination (none) 2017 Morning (Pending) AHR (none) Patient Language Language ENGLISH [13] Hospital Problems as of 2017  Reviewed: 5/15/2017  3:19 PM by Regla Green MD  
  
  
  
 Class Noted - Resolved Last Modified POA Active Problems Perforated diverticulum  2017 - Present 2017 by Osvaldo Valencia MD Unknown Entered by Osvaldo Valencia MD  
  
Non-Hospital Problems as of 2017  Reviewed: 5/15/2017  3:19 PM by Regla Green MD  
  
  
  
 Class Noted - Resolved Last Modified Active Problems Intestinal diverticular abscess  2011 - Present 2011 Entered by Roddy Bermudez MD  
  Diverticulitis  3/13/2016 - Present 2017 by Dulce Hoover MD  
  Entered by Kavita Johns MD  
  Tobacco abuse  3/13/2016 - Present 3/13/2016 by Kavita Johns MD  
  Entered by Kavita Johns MD  
  
You are allergic to the following Allergen Reactions Lisinopril Cough Current Discharge Medication List  
  
START taking these medications Dose & Instructions Dispensing Information Comments  
 amoxicillin-clavulanate 875-125 mg per tablet Commonly known as:  AUGMENTIN Dose:  1 Tab Take 1 Tab by mouth every twelve (12) hours for 5 days. Quantity:  10 Tab Refills:  0 CONTINUE these medications which have CHANGED Dose & Instructions Dispensing Information Comments  
 docusate sodium 100 mg capsule Commonly known as:  Govindgeovany Santos What changed:  Another medication with the same name was removed. Continue taking this medication, and follow the directions you see here. Dose:  100 mg Take 100 mg by mouth as needed for Constipation. Refills:  0  
   
 oxyCODONE-acetaminophen 5-325 mg per tablet Commonly known as:  PERCOCET What changed:   
- how much to take - when to take this 
- reasons to take this 
- additional instructions Dose:  1 Tab Take 1 Tab by mouth every four (4) hours as needed for Pain. Max Daily Amount: 6 Tabs. Quantity:  60 Tab Refills:  0 CONTINUE these medications which have NOT CHANGED Dose & Instructions Dispensing Information Comments BENADRYL 25 mg capsule Generic drug:  diphenhydrAMINE Dose:  25 mg Take 25 mg by mouth every six (6) hours as needed. Refills:  0  
   
 ibuprofen 200 mg tablet Commonly known as:  MOTRIN Dose:  200 mg Take 200 mg by mouth every eight (8) hours as needed for Pain. Refills:  0  
   
 nicotine 14 mg/24 hr patch Commonly known as:  [place] Dose:  1 Patch 1 Patch by TransDERmal route every twenty-four (24) hours for 30 days. Quantity:  30 Patch Refills:  0  
   
 polyethylene glycol 17 gram packet Commonly known as:  Javi Hoops Dose:  17 g Take 1 Packet by mouth daily as needed. For constipation Quantity:  30 Each Refills:  1  
   
 valsartan 160 mg tablet Commonly known as:  DIOVAN  
 Dose:  160 mg Take 1 Tab by mouth daily for 30 days. Quantity:  30 Tab Refills:  2 STOP taking these medications Comments  
 levoFLOXacin 750 mg tablet Commonly known as:  LEVAQUIN  
   
   
 metroNIDAZOLE 500 mg tablet Commonly known as:  FLAGYL  
   
   
 ondansetron 4 mg disintegrating tablet Commonly known as:  ZOFRAN ODT Surgery Information ID Date/Time Status Primary Surgeon All Procedures Location [id number] 5/15/2017 [address] [facility] MD Justina LAPAROSCOPIC  CONVERTED TO OPEN SIGMOID COLECTOMY WITH END COLOSTOMY [facility] MAIN OR    
 LAPAROSCOPIC  CONVERTED TO OPEN SIGMOID COLECTOMY WITH END COLOSTOMY:  Request 5mm Ligasure, Gel Port and Stoma Bags. [id number] 5/17/2017 [address], MD LAPAROSCOPIC POSSIBLE OPEN SIGMOID COLECTOMY WITH CYSTOSCOPYBILATEARL URETERAL STENT 
CYSTOSCOPY URETERAL STENT INSERTION OR REMOVAL [facility] MAIN OR Follow-up Information Follow up With Details Comments Contact Info Deysi Mattson on 6/6/2017 Hospital follow up at 2:45 PM  [address] [facility] [address] 
[phone] [facility]  On 5/23/2017 This is your home health provider. If you do not hear from them 24 - 48 hours after discharge please contact them directly  [phone] Discharge Instructions Naresh Lou MD, FACS Dennis NICOLE. Shayna Mckee MD, FACS Naif Garcia MD, FACS Oliver Dunn. Lobito Woodruff MD, FACS Gudelia Chen MD, FACS Harjit Olguin. MD Jackie Beltran MD 
 
[facility] Discharge Instructions for Colon Surgery Patients 1. Diagnosis: perforated diverticulitis 2. Restricted fiber diet. 3. Do not drive for 2 weeks or until after your next doctors appointment. 4. Leave steri-strips on incision. They may fall off on their own. 5. May take a shower. 6. No lifting any objects weighing more than 10 pounds.  Do not do any housework, such as vacuuming, scrubbing, etc for at least a month. 7. When you get tired during the day, take naps, as you need your rest. 
8. Multiple bowel movements are normal each day for a while. 9. May walk as desired. May go up and down stairs. 10. Take pain medication as prescribed: (NO DRIVING WHILE ON PAIN MEDICATIONS). Percocet EVERY 4-6 HOURS AS NEEDED. Other Medications: see med rec Ostomy Supplies: provided by home health 11. See me in the office in 10-14 days. Call as soon as discharged for an appointment 21 [phone]. IF SURGERY INVOLVED AN OSTOMY BAG, PLEASE BRING YOUR SUPPLIES TO YOUR 1ST VISIT! 12.  Call the Exchange [phone], if you have any questions or problems after office hours. Chart Review Routing History Recipient Method Report Sent By Lolly Salcido MD  
Fax: [phone] Phone: [phone] Fax Abiodun Anderson MD NOTES AUTO ROUTING REPORT Dali Graham MD [[id number]] 3/14/2016 12:02 AM 03/14/2016 Dom Salcido MD  
Fax: [phone] Phone: [phone] Fax Notes/Transcriptions Hayes Mays RN [[id number]] 3/17/2016  9:02 AM 03/16/2016 Notes/Transcriptions Hayes Mays RN [[id number]] 3/17/2016  9:02 AM 03/16/2016 Notes/Transcriptions Hayes Mays RN [[id number]] 3/17/2016  9:02 AM 03/16/2016 Notes/Transcriptions Hayes Mays RN [[id number]] 3/17/2016  9:02 AM 03/14/2016 Dom Salcido MD  
Fax: [phone] Phone: [phone] Fax Abiodun Anderson MD NOTES AUTO ROUTING REPORT Neeta Donnelly MD [[id number]] 3/17/2016  1:17 PM 03/17/2016

## 2017-05-14 NOTE — ED NOTES
Assumed care of pt at 1 from Rona Schmid RN. Pt resting on stretcher in a position of comfort. Reports no relief with pain medications given. Spoke with Dr. Maurice Wells, new orders received. Pt aware of need for urine specimen. Call bell within reach. Will notify staff when able to void.

## 2017-05-14 NOTE — ED NOTES
Bedside and Verbal shift change report given to Ferrel Mohs (oncoming nurse) by Jessie Smith (offgoing nurse). Report included the following information SBAR.

## 2017-05-14 NOTE — ED PROVIDER NOTES
HPI Comments: Scott Lawrence is a 46 y.o. female with PMhx significant for HTN and sigmoid diverticulitis who presents ambulatory to the ED with cc of constant abdominal pain in the RLQ and loose stool x 6 days. pt also c/o of intermittent dark urine \"that looks like poop\", with the last episode while in ED. Per chart review, pt was admitted to [address] [place] [address] on 5/1/2017 for diverticulitis and discharged on 5/6/2017 with Levaquin and Flagyl, which she endorses taking as instructed. She states she believes her current symptoms are consistent with hx of diverticulitis. Pt reports hx of appendectomy due to hydrochlorothiazide. She denies any hx of kidney stones, kidney infection, cholecystectomy, and appendectomy. She reports occasional EtOH use, but denies any tobacco and illicit drug use. Pt specifically denies any difficulty urinating. PCP: None    There are no other complaints, changes or physical findings at this time. The history is provided by the patient. Past Medical History:   Diagnosis Date    Hypertension     Morbid obesity ([address]     Sigmoid diverticulitis     Tobacco use        Past Surgical History:   Procedure Laterality Date    HX GYN      ectopic pregnancy    HX GYN      BTL         Family History:   Problem Relation Age of Onset    Colon Cancer Other        Social History     Social History    Marital status:      Spouse name: N/A    Number of children: N/A    Years of education: N/A     Occupational History    Not on file.      Social History Main Topics    Smoking status: Current Every Day Smoker     Packs/day: 1.50     Types: Cigarettes    Smokeless tobacco: Never Used    Alcohol use 0.0 oz/week     0 Standard drinks or equivalent per week      Comment: socially    Drug use: Yes     Special: Marijuana      Comment: once a week    Sexual activity: Yes     Partners: Male     Birth control/ protection: Surgical     Other Topics Concern    Not on file     Social History Narrative         ALLERGIES: Lisinopril    Review of Systems   Constitutional: Negative. Negative for chills and fever. HENT: Negative. Negative for congestion and rhinorrhea. Respiratory: Negative. Negative for cough, chest tightness and wheezing. Cardiovascular: Negative. Negative for chest pain and palpitations. Gastrointestinal: Positive for abdominal pain (RLQ). Negative for constipation, nausea and vomiting.        + loose stools   Endocrine: Negative. Genitourinary: Negative for decreased urine volume, difficulty urinating, flank pain, hematuria and pelvic pain.        + dark urine   Musculoskeletal: Negative. Negative for back pain and neck pain. Skin: Negative. Negative for color change, pallor and rash. Neurological: Negative. Negative for dizziness, seizures, weakness, numbness and headaches. Hematological: Negative. Negative for adenopathy. Psychiatric/Behavioral: Negative. All other systems reviewed and are negative. Patient Vitals for the past 12 hrs:   Temp Pulse Resp BP SpO2   05/14/17 2000 - - - (!) 148/97 95 %   05/14/17 1715 - 85 16 158/83 97 %   05/14/17 1630 - 81 16 117/64 95 %   05/14/17 1545 - 88 16 116/66 94 %   05/14/17 1415 - - - 134/81 98 %   05/14/17 1407 98.4 °F (36.9 °C) 89 18 139/87 98 %            Physical Exam   Constitutional: She is oriented to person, place, and time. She appears well-developed and well-nourished. No distress. HENT:   Head: Normocephalic and atraumatic. Mouth/Throat: No oropharyngeal exudate. Eyes: Conjunctivae are normal. Pupils are equal, round, and reactive to light. Right eye exhibits no discharge. Left eye exhibits no discharge. No scleral icterus. Neck: Normal range of motion. Neck supple. No JVD present. Cardiovascular: Normal rate, regular rhythm, normal heart sounds and intact distal pulses. Exam reveals no gallop and no friction rub. No murmur heard.   Pulmonary/Chest: Effort normal and breath sounds normal. No stridor. No respiratory distress. She has no wheezes. She has no rales. She exhibits no tenderness. Abdominal: Soft. She exhibits no distension and no mass. Bowel sounds are increased. There is tenderness in the right lower quadrant, suprapubic area and left lower quadrant. There is no rebound and no guarding. Neurological: She is alert and oriented to person, place, and time. She displays normal reflexes. No cranial nerve deficit. She exhibits normal muscle tone. Coordination normal.   Skin: Skin is warm. No rash noted. She is not diaphoretic. No pallor. Nursing note and vitals reviewed. MDM  Number of Diagnoses or Management Options  Diverticulitis of large intestine with perforation without bleeding [facility]):   Diagnosis management comments: DDx: sigmoid diverticulitis, diverticula abscess, perforated viscus, appendicitis, renal colic, sepsis, colovesical fistula    Impression/plan: Recent admission for sigmoid diverticulitis. Pt returns two days later with worsening pain and possibly passing stool through urine. CT confirms perforated diverticulum.  Consulted colorectal surgery who will see pt. will admit to hospitalist.       Amount and/or Complexity of Data Reviewed  Clinical lab tests: ordered and reviewed  Tests in the radiology section of CPT®: ordered and reviewed  Obtain history from someone other than the patient: yes  Review and summarize past medical records: yes  Discuss the patient with other providers: yes (Colorectal  Hospitalist)  Independent visualization of images, tracings, or specimens: yes    Risk of Complications, Morbidity, and/or Mortality  Presenting problems: high  Diagnostic procedures: moderate  Management options: moderate    Critical Care  Total time providing critical care: 30-74 minutes    Patient Progress  Patient progress: stable    ED Course       Procedures     CRITICAL CARE NOTE :    3:07 PM    IMPENDING DETERIORATION -Metabolic and Renal    ASSOCIATED RISK FACTORS - Hypotension, Shock, Bleeding, Metabolic changes and Vascular Compromise    MANAGEMENT- Bedside Assessment and     INTERPRETATION -  CT Scan and Blood Pressure    INTERVENTIONS - Metobolic interventions and broad spectrum antibiotics    CASE REVIEW - Hospitalist, Medical Sub-Specialist, Nursing and Family    TREATMENT RESPONSE -Stable    PERFORMED BY - Self      NOTES   :    I have spent 35 minutes of critical care time involved in lab review, consultations with specialist, family decision- making, bedside attention and documentation. During this entire length of time I was immediately available to the patient . Israel Herrera MD    CONSULT NOTE:   7:07 PM  Israel Herrera MD spoke with Dr. Priscilla Darling,   Specialty: colorectal  Discussed pt's hx, disposition, and available diagnostic and imaging results. Reviewed care plans. Consultant agrees with plans as outlined. Agrees with admission, will see and consult. Written by Echo Shahid, ED Scribe, as dictated by Israel Herrera MD.    CONSULT NOTE:   7:36 PM  Israel Herrera MD spoke with Dr. Andrew Galdamez,   Specialty: Hospitalist  Discussed pt's hx, disposition, and available diagnostic and imaging results. Reviewed care plans. Consultant will evaluate pt for admission. Written by Echo Shahid, ED Scribe, as dictated by Israel Herrera MD.    LABORATORY TESTS:  Recent Results (from the past 12 hour(s))   CBC WITH AUTOMATED DIFF    Collection Time: 05/14/17  2:42 PM   Result Value Ref Range    WBC 14.7 (H) 3.6 - 11.0 K/uL    RBC 4.25 3.80 - 5.20 M/uL    HGB 12.5 11.5 - 16.0 g/dL    HCT 37.4 35.0 - 47.0 %    MCV 88.0 80.0 - 99.0 FL    MCH 29.4 26.0 - 34.0 PG    MCHC 33.4 30.0 - 36.5 g/dL    RDW 13.8 11.5 - 14.5 %    PLATELET 013 (H) 301 - 400 K/uL    NEUTROPHILS 75 32 - 75 %    LYMPHOCYTES 13 12 - 49 %    MONOCYTES 12 5 - 13 %    EOSINOPHILS 0 0 - 7 %    BASOPHILS 0 0 - 1 %    ABS.  NEUTROPHILS 10.8 (H) 1.8 - 8.0 K/UL    ABS. LYMPHOCYTES 1.9 0.8 - 3.5 K/UL    ABS. MONOCYTES 1.8 (H) 0.0 - 1.0 K/UL    ABS. EOSINOPHILS 0.1 0.0 - 0.4 K/UL    ABS. BASOPHILS 0.0 0.0 - 0.1 K/UL   METABOLIC PANEL, COMPREHENSIVE    Collection Time: 05/14/17  2:42 PM   Result Value Ref Range    Sodium 138 136 - 145 mmol/L    Potassium 3.4 (L) 3.5 - 5.1 mmol/L    Chloride 103 97 - 108 mmol/L    CO2 28 21 - 32 mmol/L    Anion gap 7 5 - 15 mmol/L    Glucose 137 (H) 65 - 100 mg/dL    BUN 10 6 - 20 MG/DL    Creatinine 0.51 (L) 0.55 - 1.02 MG/DL    BUN/Creatinine ratio 20 12 - 20      GFR est AA >60 >60 ml/min/1.73m2    GFR est non-AA >60 >60 ml/min/1.73m2    Calcium 8.3 (L) 8.5 - 10.1 MG/DL    Bilirubin, total 0.2 0.2 - 1.0 MG/DL    ALT (SGPT) 9 (L) 12 - 78 U/L    AST (SGOT) 8 (L) 15 - 37 U/L    Alk.  phosphatase 52 45 - 117 U/L    Protein, total 7.1 6.4 - 8.2 g/dL    Albumin 2.6 (L) 3.5 - 5.0 g/dL    Globulin 4.5 (H) 2.0 - 4.0 g/dL    A-G Ratio 0.6 (L) 1.1 - 2.2     LIPASE    Collection Time: 05/14/17  2:42 PM   Result Value Ref Range    Lipase 190 73 - 393 U/L   LACTIC ACID, PLASMA    Collection Time: 05/14/17  2:42 PM   Result Value Ref Range    Lactic acid 0.8 0.4 - 2.0 MMOL/L   URINALYSIS W/ REFLEX CULTURE    Collection Time: 05/14/17  4:49 PM   Result Value Ref Range    Color YELLOW/STRAW      Appearance CLOUDY (A) CLEAR      Specific gravity 1.008 1.003 - 1.030      pH (UA) 6.0 5.0 - 8.0      Protein NEGATIVE  NEG mg/dL    Glucose NEGATIVE  NEG mg/dL    Ketone NEGATIVE  NEG mg/dL    Bilirubin NEGATIVE  NEG      Blood MODERATE (A) NEG      Urobilinogen 0.2 0.2 - 1.0 EU/dL    Nitrites NEGATIVE  NEG      Leukocyte Esterase LARGE (A) NEG      WBC >100 (H) 0 - 4 /hpf    RBC 10-20 0 - 5 /hpf    Epithelial cells FEW FEW /lpf    Bacteria 1+ (A) NEG /hpf    UA:UC IF INDICATED URINE CULTURE ORDERED (A) CNI      Yeast PRESENT (A) NEG         IMAGING RESULTS:  CT ABD PELV W CONT   Final Result   INDICATION: LLQ pain,fecal material in urine     COMPARISON: Numerous prior CTs, most recently 5/1/2017     TECHNIQUE:   Following the uneventful intravenous administration of 100 cc Isovue-370, thin  axial images were obtained through the abdomen and pelvis. Coronal and sagittal  reconstructions were generated. Oral contrast was administered. CT dose  reduction was achieved through use of a standardized protocol tailored for this  examination and automatic exposure control for dose modulation.     FINDINGS:   LUNG BASES: Clear. INCIDENTALLY IMAGED HEART AND MEDIASTINUM: Unremarkable. LIVER: No mass or biliary dilatation. GALLBLADDER: Unremarkable. SPLEEN: No mass. PANCREAS: No mass or ductal dilatation. ADRENALS: Unremarkable. KIDNEYS: No mass, calculus, or hydronephrosis. STOMACH: Unremarkable. SMALL BOWEL: No dilatation or wall thickening. COLON: Sigmoid colon diverticulitis is extensive, and the degree of inflammation  has worsened since the prior study. There are small extraluminal fluid  collections which have developed since the prior study. Moderate pelvic ascites  is mildly increased in volume. There is mass effect on the uterus. Additionally,  there is a new hyperdensity located in the pelvis adjacent to a loop of inflamed  bowel, and this is nonspecific. APPENDIX: Unremarkable. PERITONEUM: No ascites or pneumoperitoneum. RETROPERITONEUM: No lymphadenopathy or aortic aneurysm. REPRODUCTIVE ORGANS: Uterus is noted, and is surrounded by inflammation. URINARY BLADDER: There is no gas within the bladder. BONES: No destructive bone lesion. ADDITIONAL COMMENTS: N/A     IMPRESSION  IMPRESSION:  Severe sigmoid colon diverticulitis now has perforated, and there are small  fluid collections adjacent to the affected loops of sigmoid colon. Moderate  pelvic ascites has worsened mildly. There is no identifiable colovesical  fistula.        MEDICATIONS GIVEN:  Medications   sodium chloride (NS) flush 5-10 mL (10 mL IntraVENous Given 5/14/17 3605)   sodium chloride (NS) flush 5-10 mL (not administered)   sodium chloride 0.9 % bolus infusion 1,000 mL (1,000 mL IntraVENous New Bag 5/14/17 1447)   ketorolac (TORADOL) injection 30 mg (30 mg IntraVENous Given 5/14/17 1447)   HYDROmorphone (PF) (DILAUDID) injection 1 mg (1 mg IntraVENous Given 5/14/17 1536)   diatrizoate meglumine-d.sodium (MD-GASTROVIEW,GASTROGRAFIN) 66-10 % contrast solution 30 mL (30 mL Oral Given 5/14/17 1639)   0.9% sodium chloride infusion (50 mL/hr IntraVENous New Bag 5/14/17 1827)   iopamidol (ISOVUE-370) 76 % injection 100 mL (100 mL IntraVENous Given 5/14/17 1827)   sodium chloride (NS) flush 10 mL (10 mL IntraVENous Given 5/14/17 1827)   sodium chloride 0.9 % bolus infusion 1,000 mL (1,000 mL IntraVENous New Bag 5/14/17 1855)   HYDROmorphone (PF) (DILAUDID) injection 1 mg (1 mg IntraVENous Given 5/14/17 1855)   piperacillin-tazobactam (ZOSYN) 3.375 g in 0.9% sodium chloride (MBP/ADV) 100 mL MBP (3.375 g IntraVENous New Bag 5/14/17 1912)       IMPRESSION:  1. Diverticulitis of large intestine with perforation without bleeding ([address]        PLAN:  1. Admit to hospitalist     ADMIT NOTE:  7:46 PM  The patient is being admitted to the hospital by Dr. Ken Woodard. The results of their tests and reasons for their admission have been discussed with the patient and/or available family. They convey agreement and understanding for the need to be admitted and for their admission diagnosis. This note is prepared by Rukhsana Butcher, acting as Scribe for Walker Benjamin MD.    Walker Benjamin MD: The scribe's documentation has been prepared under my direction and personally reviewed by me in its entirety. I confirm that the note above accurately reflects all work, treatment, procedures, and medical decision making performed by me.

## 2017-05-15 ENCOUNTER — ANESTHESIA (OUTPATIENT)
Dept: SURGERY | Age: 52
DRG: 330 | End: 2017-05-15
Payer: COMMERCIAL

## 2017-05-15 ENCOUNTER — ANESTHESIA EVENT (OUTPATIENT)
Dept: SURGERY | Age: 52
DRG: 330 | End: 2017-05-15
Payer: COMMERCIAL

## 2017-05-15 ENCOUNTER — APPOINTMENT (OUTPATIENT)
Dept: GENERAL RADIOLOGY | Age: 52
DRG: 330 | End: 2017-05-15
Attending: ANESTHESIOLOGY
Payer: COMMERCIAL

## 2017-05-15 LAB
ABO + RH BLD: NORMAL
ALBUMIN SERPL BCP-MCNC: 2.2 G/DL (ref 3.5–5)
ALBUMIN/GLOB SERPL: 0.6 {RATIO} (ref 1.1–2.2)
ALP SERPL-CCNC: 47 U/L (ref 45–117)
ALT SERPL-CCNC: 9 U/L (ref 12–78)
ANION GAP BLD CALC-SCNC: 10 MMOL/L (ref 5–15)
AST SERPL W P-5'-P-CCNC: 7 U/L (ref 15–37)
BASOPHILS # BLD AUTO: 0 K/UL (ref 0–0.1)
BASOPHILS # BLD: 0 % (ref 0–1)
BILIRUB SERPL-MCNC: 0.3 MG/DL (ref 0.2–1)
BLOOD GROUP ANTIBODIES SERPL: NORMAL
BUN SERPL-MCNC: 8 MG/DL (ref 6–20)
BUN/CREAT SERPL: 19 (ref 12–20)
C DIFF TOX GENS STL QL NAA+PROBE: NEGATIVE
CALCIUM SERPL-MCNC: 7.8 MG/DL (ref 8.5–10.1)
CHLORIDE SERPL-SCNC: 108 MMOL/L (ref 97–108)
CO2 SERPL-SCNC: 22 MMOL/L (ref 21–32)
CREAT SERPL-MCNC: 0.43 MG/DL (ref 0.55–1.02)
EOSINOPHIL # BLD: 0.2 K/UL (ref 0–0.4)
EOSINOPHIL NFR BLD: 2 % (ref 0–7)
ERYTHROCYTE [DISTWIDTH] IN BLOOD BY AUTOMATED COUNT: 13.9 % (ref 11.5–14.5)
ERYTHROCYTE [DISTWIDTH] IN BLOOD BY AUTOMATED COUNT: 14 % (ref 11.5–14.5)
GLOBULIN SER CALC-MCNC: 4 G/DL (ref 2–4)
GLUCOSE SERPL-MCNC: 110 MG/DL (ref 65–100)
HCT VFR BLD AUTO: 35 % (ref 35–47)
HCT VFR BLD AUTO: 35.3 % (ref 35–47)
HGB BLD-MCNC: 11.5 G/DL (ref 11.5–16)
HGB BLD-MCNC: 11.6 G/DL (ref 11.5–16)
LYMPHOCYTES # BLD AUTO: 24 % (ref 12–49)
LYMPHOCYTES # BLD: 2.7 K/UL (ref 0.8–3.5)
MCH RBC QN AUTO: 28.9 PG (ref 26–34)
MCH RBC QN AUTO: 29.2 PG (ref 26–34)
MCHC RBC AUTO-ENTMCNC: 32.6 G/DL (ref 30–36.5)
MCHC RBC AUTO-ENTMCNC: 33.1 G/DL (ref 30–36.5)
MCV RBC AUTO: 88.2 FL (ref 80–99)
MCV RBC AUTO: 88.7 FL (ref 80–99)
MONOCYTES # BLD: 1.1 K/UL (ref 0–1)
MONOCYTES NFR BLD AUTO: 10 % (ref 5–13)
NEUTS SEG # BLD: 7.4 K/UL (ref 1.8–8)
NEUTS SEG NFR BLD AUTO: 64 % (ref 32–75)
PLATELET # BLD AUTO: 444 K/UL (ref 150–400)
PLATELET # BLD AUTO: 448 K/UL (ref 150–400)
POTASSIUM SERPL-SCNC: 3.5 MMOL/L (ref 3.5–5.1)
PROT SERPL-MCNC: 6.2 G/DL (ref 6.4–8.2)
RBC # BLD AUTO: 3.97 M/UL (ref 3.8–5.2)
RBC # BLD AUTO: 3.98 M/UL (ref 3.8–5.2)
SODIUM SERPL-SCNC: 140 MMOL/L (ref 136–145)
SPECIMEN EXP DATE BLD: NORMAL
WBC # BLD AUTO: 11.3 K/UL (ref 3.6–11)
WBC # BLD AUTO: 16.2 K/UL (ref 3.6–11)

## 2017-05-15 PROCEDURE — 77030031139 HC SUT VCRL2 J&J -A: Performed by: COLON & RECTAL SURGERY

## 2017-05-15 PROCEDURE — 77030002986 HC SUT PROL J&J -A: Performed by: COLON & RECTAL SURGERY

## 2017-05-15 PROCEDURE — 77030018536 HC STPLR ENDO TA DISP COVD -C: Performed by: COLON & RECTAL SURGERY

## 2017-05-15 PROCEDURE — 87493 C DIFF AMPLIFIED PROBE: CPT | Performed by: INTERNAL MEDICINE

## 2017-05-15 PROCEDURE — 77030002966 HC SUT PDS J&J -A: Performed by: COLON & RECTAL SURGERY

## 2017-05-15 PROCEDURE — 88307 TISSUE EXAM BY PATHOLOGIST: CPT | Performed by: COLON & RECTAL SURGERY

## 2017-05-15 PROCEDURE — 74011250636 HC RX REV CODE- 250/636

## 2017-05-15 PROCEDURE — 77030019927 HC TBNG IRR CYSTO BAXT -A: Performed by: COLON & RECTAL SURGERY

## 2017-05-15 PROCEDURE — 74011000250 HC RX REV CODE- 250: Performed by: COLON & RECTAL SURGERY

## 2017-05-15 PROCEDURE — 77030020263 HC SOL INJ SOD CL0.9% LFCR 1000ML: Performed by: COLON & RECTAL SURGERY

## 2017-05-15 PROCEDURE — C1729 CATH, DRAINAGE: HCPCS | Performed by: COLON & RECTAL SURGERY

## 2017-05-15 PROCEDURE — 65270000029 HC RM PRIVATE

## 2017-05-15 PROCEDURE — 36415 COLL VENOUS BLD VENIPUNCTURE: CPT | Performed by: COLON & RECTAL SURGERY

## 2017-05-15 PROCEDURE — 87205 SMEAR GRAM STAIN: CPT | Performed by: INTERNAL MEDICINE

## 2017-05-15 PROCEDURE — 85027 COMPLETE CBC AUTOMATED: CPT | Performed by: COLON & RECTAL SURGERY

## 2017-05-15 PROCEDURE — 77010033678 HC OXYGEN DAILY

## 2017-05-15 PROCEDURE — C1765 ADHESION BARRIER: HCPCS | Performed by: COLON & RECTAL SURGERY

## 2017-05-15 PROCEDURE — 85025 COMPLETE CBC W/AUTO DIFF WBC: CPT | Performed by: INTERNAL MEDICINE

## 2017-05-15 PROCEDURE — 74011000250 HC RX REV CODE- 250: Performed by: INTERNAL MEDICINE

## 2017-05-15 PROCEDURE — 77030008771 HC TU NG SALEM SUMP -A: Performed by: ANESTHESIOLOGY

## 2017-05-15 PROCEDURE — 74011250636 HC RX REV CODE- 250/636: Performed by: COLON & RECTAL SURGERY

## 2017-05-15 PROCEDURE — 74011000258 HC RX REV CODE- 258: Performed by: INTERNAL MEDICINE

## 2017-05-15 PROCEDURE — 76210000016 HC OR PH I REC 1 TO 1.5 HR: Performed by: COLON & RECTAL SURGERY

## 2017-05-15 PROCEDURE — P9045 ALBUMIN (HUMAN), 5%, 250 ML: HCPCS

## 2017-05-15 PROCEDURE — 87075 CULTR BACTERIA EXCEPT BLOOD: CPT | Performed by: INTERNAL MEDICINE

## 2017-05-15 PROCEDURE — 77030015424 HC RELD STPLR TA COVD -B: Performed by: COLON & RECTAL SURGERY

## 2017-05-15 PROCEDURE — 77030018836 HC SOL IRR NACL ICUM -A: Performed by: COLON & RECTAL SURGERY

## 2017-05-15 PROCEDURE — 74011250636 HC RX REV CODE- 250/636: Performed by: INTERNAL MEDICINE

## 2017-05-15 PROCEDURE — 77030035220 HC TRCR ENDOSC BLNTPRT ANCHR COVD -B: Performed by: COLON & RECTAL SURGERY

## 2017-05-15 PROCEDURE — 77030008684 HC TU ET CUF COVD -B: Performed by: ANESTHESIOLOGY

## 2017-05-15 PROCEDURE — 77030013079 HC BLNKT BAIR HGGR 3M -A: Performed by: ANESTHESIOLOGY

## 2017-05-15 PROCEDURE — 77030008756 HC TU IRR SUC STRY -B: Performed by: COLON & RECTAL SURGERY

## 2017-05-15 PROCEDURE — 77030034849: Performed by: COLON & RECTAL SURGERY

## 2017-05-15 PROCEDURE — 74011250636 HC RX REV CODE- 250/636: Performed by: ANESTHESIOLOGY

## 2017-05-15 PROCEDURE — 80053 COMPREHEN METABOLIC PANEL: CPT | Performed by: INTERNAL MEDICINE

## 2017-05-15 PROCEDURE — 77030019702 HC WRP THER MENM -C: Performed by: COLON & RECTAL SURGERY

## 2017-05-15 PROCEDURE — 86900 BLOOD TYPING SEROLOGIC ABO: CPT | Performed by: ANESTHESIOLOGY

## 2017-05-15 PROCEDURE — 77030032490 HC SLV COMPR SCD KNE COVD -B: Performed by: COLON & RECTAL SURGERY

## 2017-05-15 PROCEDURE — 77030002996 HC SUT SLK J&J -A: Performed by: COLON & RECTAL SURGERY

## 2017-05-15 PROCEDURE — 77030008467 HC STPLR SKN COVD -B: Performed by: COLON & RECTAL SURGERY

## 2017-05-15 PROCEDURE — 76010000132 HC OR TIME 2.5 TO 3 HR: Performed by: COLON & RECTAL SURGERY

## 2017-05-15 PROCEDURE — 77030019908 HC STETH ESOPH SIMS -A: Performed by: ANESTHESIOLOGY

## 2017-05-15 PROCEDURE — 77030016151 HC PROTCTR LNS DFOG COVD -B: Performed by: COLON & RECTAL SURGERY

## 2017-05-15 PROCEDURE — 74011000250 HC RX REV CODE- 250

## 2017-05-15 PROCEDURE — 77030011640 HC PAD GRND REM COVD -A: Performed by: COLON & RECTAL SURGERY

## 2017-05-15 PROCEDURE — 77030026438 HC STYL ET INTUB CARD -A: Performed by: ANESTHESIOLOGY

## 2017-05-15 PROCEDURE — 77030020747 HC TU INSUF ENDOSC TELE -A: Performed by: COLON & RECTAL SURGERY

## 2017-05-15 PROCEDURE — 77030018832 HC SOL IRR H20 ICUM -A: Performed by: COLON & RECTAL SURGERY

## 2017-05-15 PROCEDURE — 77030035048 HC TRCR ENDOSC OPTCL COVD -B: Performed by: COLON & RECTAL SURGERY

## 2017-05-15 PROCEDURE — 76060000036 HC ANESTHESIA 2.5 TO 3 HR: Performed by: COLON & RECTAL SURGERY

## 2017-05-15 PROCEDURE — 71010 XR CHEST PORT: CPT

## 2017-05-15 PROCEDURE — 74011250637 HC RX REV CODE- 250/637: Performed by: INTERNAL MEDICINE

## 2017-05-15 RX ORDER — MIDAZOLAM HYDROCHLORIDE 1 MG/ML
INJECTION, SOLUTION INTRAMUSCULAR; INTRAVENOUS AS NEEDED
Status: DISCONTINUED | OUTPATIENT
Start: 2017-05-15 | End: 2017-05-15 | Stop reason: HOSPADM

## 2017-05-15 RX ORDER — OXYCODONE AND ACETAMINOPHEN 5; 325 MG/1; MG/1
1 TABLET ORAL
Status: DISCONTINUED | OUTPATIENT
Start: 2017-05-15 | End: 2017-05-19

## 2017-05-15 RX ORDER — ALBUMIN HUMAN 50 G/1000ML
SOLUTION INTRAVENOUS AS NEEDED
Status: DISCONTINUED | OUTPATIENT
Start: 2017-05-15 | End: 2017-05-15 | Stop reason: HOSPADM

## 2017-05-15 RX ORDER — SODIUM CHLORIDE 0.9 % (FLUSH) 0.9 %
5-10 SYRINGE (ML) INJECTION AS NEEDED
Status: DISCONTINUED | OUTPATIENT
Start: 2017-05-15 | End: 2017-05-24 | Stop reason: HOSPADM

## 2017-05-15 RX ORDER — SODIUM CHLORIDE, SODIUM LACTATE, POTASSIUM CHLORIDE, CALCIUM CHLORIDE 600; 310; 30; 20 MG/100ML; MG/100ML; MG/100ML; MG/100ML
25 INJECTION, SOLUTION INTRAVENOUS CONTINUOUS
Status: DISCONTINUED | OUTPATIENT
Start: 2017-05-15 | End: 2017-05-15 | Stop reason: HOSPADM

## 2017-05-15 RX ORDER — LIDOCAINE HYDROCHLORIDE 10 MG/ML
0.1 INJECTION, SOLUTION EPIDURAL; INFILTRATION; INTRACAUDAL; PERINEURAL AS NEEDED
Status: DISCONTINUED | OUTPATIENT
Start: 2017-05-15 | End: 2017-05-15 | Stop reason: HOSPADM

## 2017-05-15 RX ORDER — GLYCOPYRROLATE 0.2 MG/ML
INJECTION INTRAMUSCULAR; INTRAVENOUS AS NEEDED
Status: DISCONTINUED | OUTPATIENT
Start: 2017-05-15 | End: 2017-05-15 | Stop reason: HOSPADM

## 2017-05-15 RX ORDER — NEOSTIGMINE METHYLSULFATE 1 MG/ML
INJECTION INTRAVENOUS AS NEEDED
Status: DISCONTINUED | OUTPATIENT
Start: 2017-05-15 | End: 2017-05-15 | Stop reason: HOSPADM

## 2017-05-15 RX ORDER — HYDROMORPHONE HYDROCHLORIDE 1 MG/ML
INJECTION, SOLUTION INTRAMUSCULAR; INTRAVENOUS; SUBCUTANEOUS
Status: COMPLETED
Start: 2017-05-15 | End: 2017-05-15

## 2017-05-15 RX ORDER — HYDROMORPHONE HYDROCHLORIDE 2 MG/ML
0.5 INJECTION, SOLUTION INTRAMUSCULAR; INTRAVENOUS; SUBCUTANEOUS ONCE
Status: ACTIVE | OUTPATIENT
Start: 2017-05-15 | End: 2017-05-15

## 2017-05-15 RX ORDER — SODIUM CHLORIDE, SODIUM LACTATE, POTASSIUM CHLORIDE, CALCIUM CHLORIDE 600; 310; 30; 20 MG/100ML; MG/100ML; MG/100ML; MG/100ML
INJECTION, SOLUTION INTRAVENOUS
Status: DISCONTINUED | OUTPATIENT
Start: 2017-05-15 | End: 2017-05-15 | Stop reason: HOSPADM

## 2017-05-15 RX ORDER — HYDROMORPHONE HYDROCHLORIDE 2 MG/ML
1 INJECTION, SOLUTION INTRAMUSCULAR; INTRAVENOUS; SUBCUTANEOUS ONCE
Status: COMPLETED | OUTPATIENT
Start: 2017-05-15 | End: 2017-05-16

## 2017-05-15 RX ORDER — SODIUM CHLORIDE 0.9 % (FLUSH) 0.9 %
5-10 SYRINGE (ML) INJECTION AS NEEDED
Status: DISCONTINUED | OUTPATIENT
Start: 2017-05-15 | End: 2017-05-15 | Stop reason: HOSPADM

## 2017-05-15 RX ORDER — BUPIVACAINE HYDROCHLORIDE AND EPINEPHRINE 2.5; 5 MG/ML; UG/ML
30 INJECTION, SOLUTION EPIDURAL; INFILTRATION; INTRACAUDAL; PERINEURAL ONCE
Status: COMPLETED | OUTPATIENT
Start: 2017-05-15 | End: 2017-05-15

## 2017-05-15 RX ORDER — DIPHENHYDRAMINE HYDROCHLORIDE 50 MG/ML
12.5 INJECTION, SOLUTION INTRAMUSCULAR; INTRAVENOUS AS NEEDED
Status: DISCONTINUED | OUTPATIENT
Start: 2017-05-15 | End: 2017-05-15 | Stop reason: HOSPADM

## 2017-05-15 RX ORDER — FENTANYL CITRATE 50 UG/ML
INJECTION, SOLUTION INTRAMUSCULAR; INTRAVENOUS
Status: COMPLETED
Start: 2017-05-15 | End: 2017-05-15

## 2017-05-15 RX ORDER — HYDROMORPHONE HYDROCHLORIDE 2 MG/ML
1 INJECTION, SOLUTION INTRAMUSCULAR; INTRAVENOUS; SUBCUTANEOUS
Status: DISCONTINUED | OUTPATIENT
Start: 2017-05-15 | End: 2017-05-15

## 2017-05-15 RX ORDER — ENOXAPARIN SODIUM 100 MG/ML
40 INJECTION SUBCUTANEOUS DAILY
Status: DISCONTINUED | OUTPATIENT
Start: 2017-05-16 | End: 2017-05-24 | Stop reason: HOSPADM

## 2017-05-15 RX ORDER — ACETAMINOPHEN 10 MG/ML
INJECTION, SOLUTION INTRAVENOUS AS NEEDED
Status: DISCONTINUED | OUTPATIENT
Start: 2017-05-15 | End: 2017-05-15 | Stop reason: HOSPADM

## 2017-05-15 RX ORDER — ROCURONIUM BROMIDE 10 MG/ML
INJECTION, SOLUTION INTRAVENOUS AS NEEDED
Status: DISCONTINUED | OUTPATIENT
Start: 2017-05-15 | End: 2017-05-15 | Stop reason: HOSPADM

## 2017-05-15 RX ORDER — SODIUM CHLORIDE 0.9 % (FLUSH) 0.9 %
5-10 SYRINGE (ML) INJECTION EVERY 8 HOURS
Status: DISCONTINUED | OUTPATIENT
Start: 2017-05-15 | End: 2017-05-15 | Stop reason: HOSPADM

## 2017-05-15 RX ORDER — DEXAMETHASONE SODIUM PHOSPHATE 4 MG/ML
INJECTION, SOLUTION INTRA-ARTICULAR; INTRALESIONAL; INTRAMUSCULAR; INTRAVENOUS; SOFT TISSUE AS NEEDED
Status: DISCONTINUED | OUTPATIENT
Start: 2017-05-15 | End: 2017-05-15 | Stop reason: HOSPADM

## 2017-05-15 RX ORDER — SODIUM CHLORIDE 9 MG/ML
250 INJECTION, SOLUTION INTRAVENOUS AS NEEDED
Status: DISCONTINUED | OUTPATIENT
Start: 2017-05-15 | End: 2017-05-24 | Stop reason: HOSPADM

## 2017-05-15 RX ORDER — ONDANSETRON 2 MG/ML
INJECTION INTRAMUSCULAR; INTRAVENOUS AS NEEDED
Status: DISCONTINUED | OUTPATIENT
Start: 2017-05-15 | End: 2017-05-15 | Stop reason: HOSPADM

## 2017-05-15 RX ORDER — ONDANSETRON 2 MG/ML
4 INJECTION INTRAMUSCULAR; INTRAVENOUS
Status: DISCONTINUED | OUTPATIENT
Start: 2017-05-15 | End: 2017-05-24 | Stop reason: HOSPADM

## 2017-05-15 RX ORDER — MIDAZOLAM HYDROCHLORIDE 1 MG/ML
1 INJECTION, SOLUTION INTRAMUSCULAR; INTRAVENOUS
Status: COMPLETED | OUTPATIENT
Start: 2017-05-15 | End: 2017-05-15

## 2017-05-15 RX ORDER — SODIUM CHLORIDE 0.9 % (FLUSH) 0.9 %
5-10 SYRINGE (ML) INJECTION EVERY 8 HOURS
Status: DISCONTINUED | OUTPATIENT
Start: 2017-05-15 | End: 2017-05-24 | Stop reason: HOSPADM

## 2017-05-15 RX ORDER — FENTANYL CITRATE 50 UG/ML
25 INJECTION, SOLUTION INTRAMUSCULAR; INTRAVENOUS
Status: DISCONTINUED | OUTPATIENT
Start: 2017-05-15 | End: 2017-05-15 | Stop reason: HOSPADM

## 2017-05-15 RX ORDER — HYDROMORPHONE HYDROCHLORIDE 1 MG/ML
0.2 INJECTION, SOLUTION INTRAMUSCULAR; INTRAVENOUS; SUBCUTANEOUS
Status: DISCONTINUED | OUTPATIENT
Start: 2017-05-15 | End: 2017-05-15 | Stop reason: HOSPADM

## 2017-05-15 RX ORDER — METRONIDAZOLE 500 MG/100ML
INJECTION, SOLUTION INTRAVENOUS AS NEEDED
Status: DISCONTINUED | OUTPATIENT
Start: 2017-05-15 | End: 2017-05-15 | Stop reason: HOSPADM

## 2017-05-15 RX ORDER — HYDROMORPHONE HYDROCHLORIDE 2 MG/ML
1 INJECTION, SOLUTION INTRAMUSCULAR; INTRAVENOUS; SUBCUTANEOUS
Status: DISCONTINUED | OUTPATIENT
Start: 2017-05-15 | End: 2017-05-17

## 2017-05-15 RX ORDER — LABETALOL HYDROCHLORIDE 5 MG/ML
10 INJECTION, SOLUTION INTRAVENOUS
Status: DISCONTINUED | OUTPATIENT
Start: 2017-05-15 | End: 2017-05-24 | Stop reason: HOSPADM

## 2017-05-15 RX ORDER — FENTANYL CITRATE 50 UG/ML
INJECTION, SOLUTION INTRAMUSCULAR; INTRAVENOUS AS NEEDED
Status: DISCONTINUED | OUTPATIENT
Start: 2017-05-15 | End: 2017-05-15 | Stop reason: HOSPADM

## 2017-05-15 RX ORDER — NALOXONE HYDROCHLORIDE 0.4 MG/ML
0.1 INJECTION, SOLUTION INTRAMUSCULAR; INTRAVENOUS; SUBCUTANEOUS
Status: DISCONTINUED | OUTPATIENT
Start: 2017-05-15 | End: 2017-05-24 | Stop reason: HOSPADM

## 2017-05-15 RX ORDER — PROPOFOL 10 MG/ML
INJECTION, EMULSION INTRAVENOUS AS NEEDED
Status: DISCONTINUED | OUTPATIENT
Start: 2017-05-15 | End: 2017-05-15 | Stop reason: HOSPADM

## 2017-05-15 RX ORDER — MIDAZOLAM HYDROCHLORIDE 1 MG/ML
INJECTION, SOLUTION INTRAMUSCULAR; INTRAVENOUS
Status: COMPLETED
Start: 2017-05-15 | End: 2017-05-15

## 2017-05-15 RX ORDER — LIDOCAINE HYDROCHLORIDE 20 MG/ML
INJECTION, SOLUTION EPIDURAL; INFILTRATION; INTRACAUDAL; PERINEURAL AS NEEDED
Status: DISCONTINUED | OUTPATIENT
Start: 2017-05-15 | End: 2017-05-15 | Stop reason: HOSPADM

## 2017-05-15 RX ORDER — KETOROLAC TROMETHAMINE 30 MG/ML
30 INJECTION, SOLUTION INTRAMUSCULAR; INTRAVENOUS
Status: DISPENSED | OUTPATIENT
Start: 2017-05-15 | End: 2017-05-16

## 2017-05-15 RX ADMIN — HYDROMORPHONE HYDROCHLORIDE 0.5 MG: 1 INJECTION, SOLUTION INTRAMUSCULAR; INTRAVENOUS; SUBCUTANEOUS at 05:30

## 2017-05-15 RX ADMIN — METRONIDAZOLE 500 MG: 500 INJECTION, SOLUTION INTRAVENOUS at 16:48

## 2017-05-15 RX ADMIN — HYDROMORPHONE HYDROCHLORIDE 0.5 MG: 1 INJECTION, SOLUTION INTRAMUSCULAR; INTRAVENOUS; SUBCUTANEOUS at 01:30

## 2017-05-15 RX ADMIN — HYDROMORPHONE HYDROCHLORIDE 0.5 MG: 1 INJECTION, SOLUTION INTRAMUSCULAR; INTRAVENOUS; SUBCUTANEOUS at 06:55

## 2017-05-15 RX ADMIN — FENTANYL CITRATE 50 MCG: 50 INJECTION, SOLUTION INTRAMUSCULAR; INTRAVENOUS at 17:45

## 2017-05-15 RX ADMIN — KETOROLAC TROMETHAMINE 30 MG: 30 INJECTION, SOLUTION INTRAMUSCULAR at 19:40

## 2017-05-15 RX ADMIN — FENTANYL CITRATE 100 MCG: 50 INJECTION, SOLUTION INTRAMUSCULAR; INTRAVENOUS at 16:23

## 2017-05-15 RX ADMIN — Medication 10 ML: at 01:30

## 2017-05-15 RX ADMIN — DEXTROSE MONOHYDRATE, SODIUM CHLORIDE, AND POTASSIUM CHLORIDE: 50; 9; 2.98 INJECTION, SOLUTION INTRAVENOUS at 11:42

## 2017-05-15 RX ADMIN — HYDROMORPHONE HYDROCHLORIDE 0.5 MG: 1 INJECTION, SOLUTION INTRAMUSCULAR; INTRAVENOUS; SUBCUTANEOUS at 19:45

## 2017-05-15 RX ADMIN — ROCURONIUM BROMIDE 50 MG: 10 INJECTION, SOLUTION INTRAVENOUS at 16:23

## 2017-05-15 RX ADMIN — CEFEPIME HYDROCHLORIDE 2 G: 2 INJECTION, POWDER, FOR SOLUTION INTRAVENOUS at 21:26

## 2017-05-15 RX ADMIN — MIDAZOLAM HYDROCHLORIDE 2 MG: 1 INJECTION, SOLUTION INTRAMUSCULAR; INTRAVENOUS at 16:11

## 2017-05-15 RX ADMIN — ONDANSETRON HYDROCHLORIDE 4 MG: 2 INJECTION, SOLUTION INTRAMUSCULAR; INTRAVENOUS at 01:30

## 2017-05-15 RX ADMIN — FENTANYL CITRATE 25 MCG: 50 INJECTION, SOLUTION INTRAMUSCULAR; INTRAVENOUS at 19:32

## 2017-05-15 RX ADMIN — Medication 10 ML: at 21:20

## 2017-05-15 RX ADMIN — Medication 10 ML: at 05:30

## 2017-05-15 RX ADMIN — MIDAZOLAM HYDROCHLORIDE 1 MG: 1 INJECTION, SOLUTION INTRAMUSCULAR; INTRAVENOUS at 19:43

## 2017-05-15 RX ADMIN — FENTANYL CITRATE 50 MCG: 50 INJECTION, SOLUTION INTRAMUSCULAR; INTRAVENOUS at 19:13

## 2017-05-15 RX ADMIN — PROPOFOL 200 MG: 10 INJECTION, EMULSION INTRAVENOUS at 16:23

## 2017-05-15 RX ADMIN — SODIUM CHLORIDE, SODIUM LACTATE, POTASSIUM CHLORIDE, CALCIUM CHLORIDE: 600; 310; 30; 20 INJECTION, SOLUTION INTRAVENOUS at 16:51

## 2017-05-15 RX ADMIN — HYDROMORPHONE HYDROCHLORIDE 0.5 MG: 1 INJECTION, SOLUTION INTRAMUSCULAR; INTRAVENOUS; SUBCUTANEOUS at 09:45

## 2017-05-15 RX ADMIN — ALBUMIN HUMAN 250 ML: 50 SOLUTION INTRAVENOUS at 18:13

## 2017-05-15 RX ADMIN — OXYCODONE HYDROCHLORIDE AND ACETAMINOPHEN 2 TABLET: 5; 325 TABLET ORAL at 13:58

## 2017-05-15 RX ADMIN — METRONIDAZOLE 500 MG: 500 INJECTION, SOLUTION INTRAVENOUS at 00:12

## 2017-05-15 RX ADMIN — ONDANSETRON HYDROCHLORIDE 4 MG: 2 INJECTION, SOLUTION INTRAMUSCULAR; INTRAVENOUS at 05:35

## 2017-05-15 RX ADMIN — ROCURONIUM BROMIDE 20 MG: 10 INJECTION, SOLUTION INTRAVENOUS at 17:16

## 2017-05-15 RX ADMIN — HYDROMORPHONE HYDROCHLORIDE 0.5 MG: 1 INJECTION, SOLUTION INTRAMUSCULAR; INTRAVENOUS; SUBCUTANEOUS at 19:25

## 2017-05-15 RX ADMIN — OXYCODONE HYDROCHLORIDE AND ACETAMINOPHEN 2 TABLET: 5; 325 TABLET ORAL at 03:19

## 2017-05-15 RX ADMIN — NEOSTIGMINE METHYLSULFATE 3 MG: 1 INJECTION INTRAVENOUS at 19:01

## 2017-05-15 RX ADMIN — VALSARTAN 160 MG: 160 TABLET ORAL at 08:12

## 2017-05-15 RX ADMIN — HYDROMORPHONE HYDROCHLORIDE 0.5 MG: 1 INJECTION, SOLUTION INTRAMUSCULAR; INTRAVENOUS; SUBCUTANEOUS at 12:43

## 2017-05-15 RX ADMIN — SODIUM CHLORIDE, SODIUM LACTATE, POTASSIUM CHLORIDE, CALCIUM CHLORIDE: 600; 310; 30; 20 INJECTION, SOLUTION INTRAVENOUS at 16:11

## 2017-05-15 RX ADMIN — FENTANYL CITRATE 25 MCG: 50 INJECTION, SOLUTION INTRAMUSCULAR; INTRAVENOUS at 19:42

## 2017-05-15 RX ADMIN — ONDANSETRON 4 MG: 2 INJECTION INTRAMUSCULAR; INTRAVENOUS at 17:20

## 2017-05-15 RX ADMIN — GLYCOPYRROLATE 0.5 MG: 0.2 INJECTION INTRAMUSCULAR; INTRAVENOUS at 19:01

## 2017-05-15 RX ADMIN — LIDOCAINE HYDROCHLORIDE 60 MG: 20 INJECTION, SOLUTION EPIDURAL; INFILTRATION; INTRACAUDAL; PERINEURAL at 16:23

## 2017-05-15 RX ADMIN — HYDROMORPHONE HYDROCHLORIDE 0.5 MG: 1 INJECTION, SOLUTION INTRAMUSCULAR; INTRAVENOUS; SUBCUTANEOUS at 21:05

## 2017-05-15 RX ADMIN — FENTANYL CITRATE 25 MCG: 50 INJECTION, SOLUTION INTRAMUSCULAR; INTRAVENOUS at 19:37

## 2017-05-15 RX ADMIN — FENTANYL CITRATE 50 MCG: 50 INJECTION, SOLUTION INTRAMUSCULAR; INTRAVENOUS at 18:30

## 2017-05-15 RX ADMIN — FENTANYL CITRATE 25 MCG: 50 INJECTION, SOLUTION INTRAMUSCULAR; INTRAVENOUS at 19:54

## 2017-05-15 RX ADMIN — METRONIDAZOLE 500 MG: 500 INJECTION, SOLUTION INTRAVENOUS at 08:09

## 2017-05-15 RX ADMIN — DEXTROSE MONOHYDRATE, SODIUM CHLORIDE, AND POTASSIUM CHLORIDE: 50; 9; 2.98 INJECTION, SOLUTION INTRAVENOUS at 22:30

## 2017-05-15 RX ADMIN — ONDANSETRON HYDROCHLORIDE 4 MG: 2 INJECTION, SOLUTION INTRAMUSCULAR; INTRAVENOUS at 09:43

## 2017-05-15 RX ADMIN — CEFEPIME HYDROCHLORIDE 2 G: 2 INJECTION, POWDER, FOR SOLUTION INTRAVENOUS at 12:40

## 2017-05-15 RX ADMIN — FENTANYL CITRATE 50 MCG: 50 INJECTION, SOLUTION INTRAMUSCULAR; INTRAVENOUS at 17:30

## 2017-05-15 RX ADMIN — DEXAMETHASONE SODIUM PHOSPHATE 4 MG: 4 INJECTION, SOLUTION INTRA-ARTICULAR; INTRALESIONAL; INTRAMUSCULAR; INTRAVENOUS; SOFT TISSUE at 17:20

## 2017-05-15 RX ADMIN — OXYCODONE HYDROCHLORIDE AND ACETAMINOPHEN 1 TABLET: 5; 325 TABLET ORAL at 21:16

## 2017-05-15 RX ADMIN — Medication 10 ML: at 11:42

## 2017-05-15 RX ADMIN — FENTANYL CITRATE 25 MCG: 50 INJECTION, SOLUTION INTRAMUSCULAR; INTRAVENOUS at 19:27

## 2017-05-15 RX ADMIN — ACETAMINOPHEN 1000 MG: 10 INJECTION, SOLUTION INTRAVENOUS at 18:43

## 2017-05-15 NOTE — ANESTHESIA PREPROCEDURE EVALUATION
Anesthetic History   No history of anesthetic complications            Review of Systems / Medical History  Patient summary reviewed, nursing notes reviewed and pertinent labs reviewed    Pulmonary          Smoker      Comments: Smoker - 1.5 ppd   Neuro/Psych   Within defined limits           Cardiovascular    Hypertension: well controlled              Exercise tolerance: >4 METS     GI/Hepatic/Renal               Comments: Perforated Sigmoid Diverticulitis Endo/Other        Morbid obesity     Other Findings            Physical Exam    Airway  Mallampati: III  TM Distance: > 6 cm  Neck ROM: normal range of motion   Mouth opening: Normal     Cardiovascular  Regular rate and rhythm,  S1 and S2 normal,  no murmur, click, rub, or gallop             Dental      Comments: Several missing molars, no loose teeth.    Pulmonary  Breath sounds clear to auscultation               Abdominal  GI exam deferred       Other Findings            Anesthetic Plan    ASA: 3  Anesthesia type: general          Induction: Intravenous  Anesthetic plan and risks discussed with: Patient

## 2017-05-15 NOTE — PERIOP NOTES
Patient to pre-op holding area. Patient verbally identified herself, her procedure and NPO status. Patient verbalized her understanding of her procedure. Patient VSS-BP elevated. Patient in no acute distress but with c/o lower abdominal pain 5/10. Patient alert and oriented x4. Patient with callbell in reach and awaiting OR.

## 2017-05-15 NOTE — ANESTHESIA PROCEDURE NOTES
Central Line Placement    Start time: 5/15/2017 5:45 PM  End time: 5/15/2017 5:58 PM  Performed by: Alaina Cedeño  Authorized by: Alaina Cedeño     Indications: central pressure monitoring and vascular access  Timeout Time: 17:45       Pre-procedure: All elements of maximal sterile barrier technique followed? Yes    Maximal barrier precautions followed, 2% Chlorhexidine for cutaneous antisepsis and Hand hygiene performed prior to catheter insertion            Procedure:   Prep:  Chlorhexidine  Location:  Internal jugular  Orientation:  Right  Patient position:  Trendelenburg  Catheter type:  Quad lumen  Catheter size:  8.5 Fr  Catheter length:  16 cm  Number of attempts:  1  Successful placement: Yes      Assessment:   Post-procedure:  Catheter secured, sterile dressing applied and sterile dressing with CHG applied  Assessment:  Blood return through all ports and free fluid flow  Insertion:  Uncomplicated  Patient tolerance:  Patient tolerated the procedure well with no immediate complications  Central Line Procedure Note    Indication: Inadequate venous access    Risks, benefits, alternatives explained and patient agrees to proceed. Patient positioned in Trendelenburg. 7-Step Sterility Protocol followed. (cap, mask sterile gown, sterile gloves, large sterile sheet, hand hygiene, 2% chlorhexidine for cutaneous antisepsis)  5 mL 1% Lidocaine placed at insertion site. Right internal jugular cannulated x 1 attempt(s) utilizing the Seldinger technique. Catheter secured & Biopatch applied. Sterile Tegaderm placed. CXR pending.     Care turned over to covering Attending MD.

## 2017-05-15 NOTE — H&P
Hospitalist Admission Note    NAME:  Santa Castaneda   :   1965   MRN:   [id number]     Date of admit:  2017    PCP: None    Assessment/Plan:      Acute Diverticulitis now with perforation POA  Leukocytosis WBC 14,700 POA  Diarrhea POA  Just discharged for persistent diverticulitis, no perforation on prior scan  Admit  Colorectal surgery to see  NPO   IV ceftriaxone and metronidazole  IVF  PRN pain meds  No clear colovesicular fistula on CT scan, the urinary changes may be related to adjacent inflammation  Check C difficile    Essential HTN POA  Continue valsartan  PRN NTG    Tobacco use POA  Nicotine patch    Morbid obesity POA    DVT prophylaxis: lovenox SQ    Stress ulcer prophylaxis: Not indicated    Code status: Full code    Next of Kin:     Subjective:   CHIEF COMPLAINT:  \"I have been having diarrhea and abdominal pain\"    HISTORY OF PRESENT ILLNESS:      46 y.o.    female   3 plus prior episodes of diverticulitis    Current episode began several weeks while [place] [address] [place])  Increasing 9/10 abdominal pain in LLQ  [facility] in [facility], treated with 2 courses of oral antibiotics as outpatient  Her pain was persistent despite the antibiotics and so she came to ED [facility]     to 2017 admitted at [address] [place] [address]  with severe sigmoid diverticulitis without evidence of perforation or abscess  Mild acute pancreatitis  Followed by Dr Lory Rivera, plans for evaluation for OR given the multiple episodes once the current bout had resolved  Pain improved at discharge  D/C on 2 additional weeks Po levaquin and flagyl    Pain in LQ bilaterally increasing the past 3-4 days, now back up to 7/10, more diffuse across the lower abdomen  Developed frequent watery diarrhea past few days \"almost continuously\", no associate blood or melena  Episodes of N/V without blood or coffee grounds  No F/C  Severe episodes urine started looking like my poop\" with gas bubbles, including one episode in the ED  Positive moderate low back pain    ED Leukocytosis   CT scan with evidence of perforation now        Past Medical History:   Diagnosis Date    Hypertension     Morbid obesity ([address]     Sigmoid diverticulitis     Tobacco use         Past Surgical History:   Procedure Laterality Date    HX GYN      ectopic pregnancy    HX GYN      BTL       Social History:  Social History   Substance Use Topics    Smoking status: Current Every Day Smoker     Packs/day: 1.50     Types: Cigarettes    Smokeless tobacco: Never Used    Alcohol use 0.0 oz/week     0 Standard drinks or equivalent per week      Comment: socially    Lives with , currently moving to [place], [place]    FAMILY HISTORY:  Family History   Problem Relation Age of Onset    Colon Cancer Other    3 son healthy  Father had diverticulitis with perforation    Allergies   Allergen Reactions    Lisinopril Cough        Prior to Admission medications    Medication Sig Start Date End Date Taking? Authorizing Provider   levoFLOXacin (LEVAQUIN) 750 mg tablet Take 1 Tab by mouth Daily (before dinner) for 14 days. 5/6/17 5/20/17 Yes Michelle Kenny MD   metroNIDAZOLE (FLAGYL) 500 mg tablet Take 1 Tab by mouth three (3) times daily for 14 days. 5/6/17 5/20/17 Yes Michelle Kenny MD   ondansetron (ZOFRAN ODT) 4 mg disintegrating tablet Take 1 Tab by mouth every eight (8) hours as needed for Nausea. 5/6/17  Yes Michelle Kenny MD   docusate sodium (COLACE) 100 mg capsule Take 1 Cap by mouth two (2) times a day for 30 days. 5/6/17 6/5/17 Yes Michelle Kenny MD   polyethylene glycol [facility]) 17 gram packet Take 1 Packet by mouth daily as needed. For constipation 5/6/17  Yes Michelle Kenny MD   oxyCODONE-acetaminophen (PERCOCET) 5-325 mg per tablet Take 1-2 Tabs by mouth every six (6) hours as needed. Max Daily Amount: 8 Tabs.  For pain 5/6/17   Michelle Kenny MD   valsartan (DIOVAN) 160 mg tablet Take 1 Tab by mouth daily for 30 days. 17  Suellen Sánchez MD   nicotine (NICODERM CQ) 14 mg/24 hr patch 1 Patch by TransDERmal route every twenty-four (24) hours for 30 days. 17  Suellen Sánchez MD       REVIEW OF SYSTEMS:  Bold equals positive    Yes Total of 12 systems reviewed as follows:  Constitutional: fever Chills   Eyes:   Diplopia visual changes  eye pain  ENT:   coryza  Sore throat Dysphagia  Respiratory:  cough or sputum  Hemoptysis dyspnea  Cards:  chest pain  orthopnea LE edema  GI:   Nausea/vomiting Diarrhea LQ abdominal pain    melena  BRBPR  Genitourinary:  frequency  dysuria Hematuria  Urine \"looks like my poop\"  Integument:  Rash Ulcers  Nodules  Hematologic:  Easy bruising, negative gum/nose bleeding  Endocrine: Polyuria/poldipsia heat/cold intolerance  Musculoskel: Myalgias Joint pain/swelling/redness Gout  Neurological:  Headaches  focal motor or sensory changes    Objective:   VITALS:    Patient Vitals for the past 24 hrs:   Temp Pulse Resp BP SpO2   17 - - - (!) 148/97 95 %   17 1715 - 85 16 158/83 97 %   17 1630 - 81 16 117/64 95 %   17 1545 - 88 16 116/66 94 %   17 1415 - - - 134/81 98 %   17 1407 98.4 °F (36.9 °C) 89 18 139/87 98 %     Temp (24hrs), Av.4 °F (36.9 °C), Min:98.4 °F (36.9 °C), Max:98.4 °F (36.9 °C)      O2 Device: Room air    PHYSICAL EXAM:   General:    Alert, cooperative in no distress     HEENT: Normocephalic, atraumatic    PERRL, EOMI  Sclera no icterus    Nasal mucosa without masses or discharge  Hearing intact to voice    Oropharynx without erythema or exudate  No thrush  Pink MM  Neck:  No meningismus, trachea midline, no carotid bruits     Thyroid not enlarged, no nodules or tenderness  Lungs:   Clear to auscultation bilaterally. No wheezing or rales    No accessory muscle use or retractions. Heart:   Regular rate and rhythm,  no murmur or gallop.     No LE edema    Peripheral pulses 2+, symmetric  Abdomen:   Soft, mod to severely tender daniele in lower quadrants. Not distended. Bowel sounds present. No masses, No Hepatosplenomegaly, Mild rebound   Lymph nodes: No cervical or inguinal PENG  Musculoskeletal:  No Joint swelling, erythema, warmth. No Cyanosis or clubbing  Skin:      No rashes     Not Jaundiced   No nodules or thickening    Normal capillary refill  Neurologic: Alert and oriented X 3, follows commands     Cranial nerves 2 to 12 intact    Symmetric motor strength bilaterally         LAB DATA REVIEWED:    Recent Results (from the past 24 hour(s))   CBC WITH AUTOMATED DIFF    Collection Time: 05/14/17  2:42 PM   Result Value Ref Range    WBC 14.7 (H) 3.6 - 11.0 K/uL    RBC 4.25 3.80 - 5.20 M/uL    HGB 12.5 11.5 - 16.0 g/dL    HCT 37.4 35.0 - 47.0 %    MCV 88.0 80.0 - 99.0 FL    MCH 29.4 26.0 - 34.0 PG    MCHC 33.4 30.0 - 36.5 g/dL    RDW 13.8 11.5 - 14.5 %    PLATELET 732 (H) 846 - 400 K/uL    NEUTROPHILS 75 32 - 75 %    LYMPHOCYTES 13 12 - 49 %    MONOCYTES 12 5 - 13 %    EOSINOPHILS 0 0 - 7 %    BASOPHILS 0 0 - 1 %    ABS. NEUTROPHILS 10.8 (H) 1.8 - 8.0 K/UL    ABS. LYMPHOCYTES 1.9 0.8 - 3.5 K/UL    ABS. MONOCYTES 1.8 (H) 0.0 - 1.0 K/UL    ABS. EOSINOPHILS 0.1 0.0 - 0.4 K/UL    ABS. BASOPHILS 0.0 0.0 - 0.1 K/UL   METABOLIC PANEL, COMPREHENSIVE    Collection Time: 05/14/17  2:42 PM   Result Value Ref Range    Sodium 138 136 - 145 mmol/L    Potassium 3.4 (L) 3.5 - 5.1 mmol/L    Chloride 103 97 - 108 mmol/L    CO2 28 21 - 32 mmol/L    Anion gap 7 5 - 15 mmol/L    Glucose 137 (H) 65 - 100 mg/dL    BUN 10 6 - 20 MG/DL    Creatinine 0.51 (L) 0.55 - 1.02 MG/DL    BUN/Creatinine ratio 20 12 - 20      GFR est AA >60 >60 ml/min/1.73m2    GFR est non-AA >60 >60 ml/min/1.73m2    Calcium 8.3 (L) 8.5 - 10.1 MG/DL    Bilirubin, total 0.2 0.2 - 1.0 MG/DL    ALT (SGPT) 9 (L) 12 - 78 U/L    AST (SGOT) 8 (L) 15 - 37 U/L    Alk.  phosphatase 52 45 - 117 U/L    Protein, total 7.1 6.4 - 8.2 g/dL    Albumin 2.6 (L) 3.5 - 5.0 g/dL    Globulin 4.5 (H) 2.0 - 4.0 g/dL    A-G Ratio 0.6 (L) 1.1 - 2.2     LIPASE    Collection Time: 05/14/17  2:42 PM   Result Value Ref Range    Lipase 190 73 - 393 U/L   LACTIC ACID, PLASMA    Collection Time: 05/14/17  2:42 PM   Result Value Ref Range    Lactic acid 0.8 0.4 - 2.0 MMOL/L   URINALYSIS W/ REFLEX CULTURE    Collection Time: 05/14/17  4:49 PM   Result Value Ref Range    Color YELLOW/STRAW      Appearance CLOUDY (A) CLEAR      Specific gravity 1.008 1.003 - 1.030      pH (UA) 6.0 5.0 - 8.0      Protein NEGATIVE  NEG mg/dL    Glucose NEGATIVE  NEG mg/dL    Ketone NEGATIVE  NEG mg/dL    Bilirubin NEGATIVE  NEG      Blood MODERATE (A) NEG      Urobilinogen 0.2 0.2 - 1.0 EU/dL    Nitrites NEGATIVE  NEG      Leukocyte Esterase LARGE (A) NEG      WBC >100 (H) 0 - 4 /hpf    RBC 10-20 0 - 5 /hpf    Epithelial cells FEW FEW /lpf    Bacteria 1+ (A) NEG /hpf    UA:UC IF INDICATED URINE CULTURE ORDERED (A) CNI      Yeast PRESENT (A) NEG         I have reviewed the results of all available imaging studies. Yes I have personally reviewed the actual  CT scan    CT scan abdomen/pelvis FINDINGS:   LUNG BASES: Clear. INCIDENTALLY IMAGED HEART AND MEDIASTINUM: Unremarkable. LIVER: No mass or biliary dilatation. GALLBLADDER: Unremarkable. SPLEEN: No mass. PANCREAS: No mass or ductal dilatation. ADRENALS: Unremarkable. KIDNEYS: No mass, calculus, or hydronephrosis. STOMACH: Unremarkable. SMALL BOWEL: No dilatation or wall thickening. COLON: Sigmoid colon diverticulitis is extensive, and the degree of inflammation  has worsened since the prior study. There are small extraluminal fluid  collections which have developed since the prior study. Moderate pelvic ascites  is mildly increased in volume. There is mass effect on the uterus. Additionally,  there is a new hyperdensity located in the pelvis adjacent to a loop of inflamed  bowel, and this is nonspecific. APPENDIX: Unremarkable.   PERITONEUM: No ascites or pneumoperitoneum. RETROPERITONEUM: No lymphadenopathy or aortic aneurysm. REPRODUCTIVE ORGANS: Uterus is noted, and is surrounded by inflammation. URINARY BLADDER: There is no gas within the bladder. BONES: No destructive bone lesion. ADDITIONAL COMMENTS: N/A  IMPRESSION:  Severe sigmoid colon diverticulitis now has perforated, and there are small  fluid collections adjacent to the affected loops of sigmoid colon. Moderate  pelvic ascites has worsened mildly. There is no identifiable colovesical  fistula.    ___________________________________________________    Inpatient is warranted for this patient because they presents with increased abdominal pain      I have a high level of concern for high risk of deterioration due to perforated colonic diverticulitis  I anticipate the stay in the hospital will span at least 2 midnights. My assessment of the clinical condition and my plan of care is as outlined above    __________________________________________________    Care Plan discussed with:    Patient, ED Doc     I saw the patient personally, took a history and did a complete physical exam at the bedside. I performed complex decision making in coming up with a diagnostic and treatment plan for the patient. I reviewed the patient's past medical records, current laboratory and radiology results, and actual Xray films/EKG. I have also discussed this case with the involved ED physician.     Risk of deterioration: High    Total Time Coordinating Admission: 65    minutes    Total Critical Care Time:     Admitting Physician: Malik Aleman MD

## 2017-05-15 NOTE — PERIOP NOTES
Patient: Nathen Severin MRN: [id number]  SSN: [id number]   YOB: 1965  Age: 46 y.o. Sex: female     Patient is status post Procedure(s) with comments:  LAPAROSCOPIC  CONVERTED TO OPEN SIGMOID COLECTOMY WITH END COLOSTOMY - Request 5mm Ligasure, Gel Port and Stoma Bags. He [place]) and Role:     * Beverley Schlatter, MD - Primary    Local/Dose/Irrigation:                Rochelle Ryan Right Neck (Active)        Peripheral IV 05/15/17 Right Hand (Active)   Site Assessment Clean, dry, & intact 5/15/2017  3:57 PM   Phlebitis Assessment 0 5/15/2017  3:57 PM   Infiltration Assessment 0 5/15/2017  3:57 PM   Dressing Status Clean, dry, & intact 5/15/2017  3:57 PM   Dressing Type Tape;Transparent 5/15/2017  3:57 PM   Hub Color/Line Status Pink; Infusing;Patent 5/15/2017  3:57 PM       Peripheral IV Right Forearm (Active)          Elvin Drain #1 05/15/17 Anterior Abdomen (Active)                     Dressing/Packing:  Wound Abdomen Anterior-DRESSING TYPE: Adhesive wound dressing (Band-Aid); Non-adherent (op-site) (05/15/17 1700)  Splint/Cast:  ]    Other:

## 2017-05-15 NOTE — CONSULTS
Colon and Rectal Surgery Consult      Date of consultation:5/15/2017    Subjective:     Chief Complaint  recurrent diverticulitis    History of Present Illness  47 yo F with h/o chronic diverticulitis presents with worsening abdominal pain and passing gas and stool when she urinates. She was d/c from the hospital approx 1 week ago with plan to follow up with Dr. Pratima Sosa to undergo elective resection. Her pain got significantly worse over the past few days and she noticed that she was passing debris in her urine. Of note she was scheduled to undergo elective sigmoid resection back in december but she had issues with changing jobs and a move and so she canceled her surgery.       Past Medical History:   Diagnosis Date    Hypertension     Morbid obesity ([address]     Sigmoid diverticulitis     Tobacco use      Past Surgical History:   Procedure Laterality Date    HX GYN      ectopic pregnancy    HX GYN      BTL     Allergies   Allergen Reactions    Lisinopril Cough     Family History   Problem Relation Age of Onset    Colon Cancer Other      Social History     Social History    Marital status:      Spouse name: N/A    Number of children: N/A    Years of education: N/A     Social History Main Topics    Smoking status: Current Every Day Smoker     Packs/day: 1.50     Types: Cigarettes    Smokeless tobacco: Never Used    Alcohol use 0.0 oz/week     0 Standard drinks or equivalent per week      Comment: socially    Drug use: Yes     Special: Marijuana      Comment: once a week    Sexual activity: Yes     Partners: Male     Birth control/ protection: Surgical     Other Topics Concern    Not on file     Social History Narrative       Review of Systems      10 point ROS negative except as stated in the HPI    Physical Exam   Visit Vitals    /90 (BP 1 Location: Left arm, BP Patient Position: At rest)    Pulse 89    Temp 98.5 °F (36.9 °C)    Resp 18    Wt 85.3 kg (188 lb)    LMP 08/26/2014    SpO2 95%    BMI 34.39 kg/m2       General Appearance - alert and oriented x 3, in no acute distress  Lungs - clear to auscultation b/l   Cardiovascular - regular rate and rhythm  Abdomen - soft, ND, TTP in the LLQ and suprapubic area - guarding in the LLQ  Extremities - nml, atraumatic, no edema     Radiology Results  Pertinent images reviewed    Assessment/Plan  47 yo F with recurrent diverticulitis with small fluid collections adjacent to the colon - also has evidence of colovesical or colovaginal fistula. No free air or evidence of perforation. Recommendations  - patient is stable but has been readmitted and diverticulitis has gotten worse over the past week. She will likely require resection during this admission. Will discuss the timing with Dr. Molina Clayton as he has seen her in the past.  - cont IV abx  - npo for now    Thank you for involving us in the care of this patient. Please contact our service if you have any additional questions. Cain Acevedo MD  [facility]  [phone]    [address] [facility], [address] [facility], [address] [facility]    [address] [facility], [address]    [address]

## 2017-05-15 NOTE — PROGRESS NOTES
Patient states that her pain is worsening. Will go ahead and operate today. Lap possible open sigmoid colectomy with end colostomy. Discussed risks, benefits, alternatives with her and her .

## 2017-05-15 NOTE — PROGRESS NOTES
Hospitalist Progress Note    NAME: Keke Leslie   :  1965   MRN:  [id number]       Interim Hospital Summary: 46 y.o. female whom presented on 2017 with      Assessment / Plan:  Acute Diverticulitis now with perforation POA showing signs of peritoneal irritation, will keep NPO, needs Surgery, patient is low to moderate risk for intermediate risk surgery can proceed whenever deemed appropriate. C/w Cefepime and Flagyl. Sunset rectal Surgery help appreciated. Leukocytosis WBC 14,700 POA due to UTI and peritonitis. Diarrhea POA f/u stool work up. UTI: c/w Cefepime f/u cultures  Essential HTN POA c/w Diovan use labetalol prn. Tobacco use POA  C/w Nicotine patch  Morbid obesity POA  Next of Kin:   Body mass index is 34.39 kg/(m^2). Code status: Full  Prophylaxis: SCD's  Recommended Disposition:  PT, OT, RN     Subjective:     Chief Complaint / Reason for Physician Visit  \"My belly hurts\". Discussed with RN events overnight. Review of Systems:  Symptom Y/N Comments  Symptom Y/N Comments   Fever/Chills    Chest Pain     Poor Appetite    Edema     Cough    Abdominal Pain y    Sputum    Joint Pain     SOB/MORAES    Pruritis/Rash     Nausea/vomit    Tolerating PT/OT     Diarrhea    Tolerating Diet     Constipation    Other       Could NOT obtain due to:      Objective:     VITALS:   Last 24hrs VS reviewed since prior progress note.  Most recent are:  Patient Vitals for the past 24 hrs:   Temp Pulse Resp BP SpO2   05/15/17 1141 98.7 °F (37.1 °C) 90 18 133/87 96 %   05/15/17 0811 98 °F (36.7 °C) 80 18 126/89 96 %   05/15/17 0310 98.5 °F (36.9 °C) 89 18 142/90 95 %   17 2309 98.5 °F (36.9 °C) 81 18 151/89 100 %   17 - - - (!) 148/97 95 %   17 1715 - 85 16 158/83 97 %   17 1630 - 81 16 117/64 95 %   17 1545 - 88 16 116/66 94 %   17 1415 - - - 134/81 98 %   17 1407 98.4 °F (36.9 °C) 89 18 139/87 98 %       Intake/Output Summary (Last 24 hours) at 05/15/17 1148  Last data filed at 05/15/17 0950   Gross per 24 hour   Intake                0 ml   Output              300 ml   Net             -300 ml        PHYSICAL EXAM:  General: WD, WN. Alert, cooperative, no acute distress    EENT:  EOMI. Anicteric sclerae. MMM  Resp:  CTA bilaterally, no wheezing or rales. No accessory muscle use  CV:  Regular  rhythm,  No edema  GI:  Soft, Non distended, + diffuse tender, + rebound.  +Bowel sounds  Neurologic:  Alert and oriented X 3, normal speech,   Psych:   Good insight. Not anxious nor agitated  Skin:  No rashes. No jaundice    Reviewed most current lab test results and cultures  YES  Reviewed most current radiology test results   YES  Review and summation of old records today    NO  Reviewed patient's current orders and MAR    YES  PMH/SH reviewed - no change compared to H&P  ________________________________________________________________________  Care Plan discussed with:    Comments   Patient y    Family      RN y    Care Manager     Consultant                        Multidiciplinary team rounds were held today with , nursing, pharmacist and clinical coordinator. Patient's plan of care was discussed; medications were reviewed and discharge planning was addressed. ________________________________________________________________________  Total NON critical care TIME: 35   Minutes    Total CRITICAL CARE TIME Spent:   Minutes non procedure based      Comments   >50% of visit spent in counseling and coordination of care     ________________________________________________________________________  Dariela Hampton MD     Procedures: see electronic medical records for all procedures/Xrays and details which were not copied into this note but were reviewed prior to creation of Plan. LABS:  I reviewed today's most current labs and imaging studies.   Pertinent labs include:  Recent Labs      05/15/17   0322  05/14/17   1442   WBC  11.3*  14.7*   HGB 11.6  12.5   HCT  35.0  37.4   PLT  448*  440*     Recent Labs      05/15/17   0322  05/14/17   1442   NA  140  138   K  3.5  3.4*   CL  108  103   CO2  22  28   GLU  110*  137*   BUN  8  10   CREA  0.43*  0.51*   CA  7.8*  8.3*   ALB  2.2*  2.6*   TBILI  0.3  0.2   SGOT  7*  8*   ALT  9*  9*       Signed: Debbie Galarza MD

## 2017-05-15 NOTE — PERIOP NOTES
Handoff Report from Operating Room to PACU    Report received from  Scooter Regan RN  and [address]  regarding Ana María Sorto. Surgeon(s):  Sukh Honeycutt MD  And Procedure(s) (LRB):  LAPAROSCOPIC  CONVERTED TO OPEN SIGMOID COLECTOMY WITH END COLOSTOMY (Left)  confirmed   with allergies, drains and dressings discussed. Anesthesia type, drugs, patient history, complications, estimated blood loss, vital signs, intake and output, and last pain medication and reversal medications were reviewed.

## 2017-05-15 NOTE — PROGRESS NOTES
End of Shift Nursing Note    Bedside shift change report given to Heriberto Talbert RN (oncoming nurse) by Karishma Horn (offgoing nurse). Report included the following information SBAR, Kardex, Intake/Output and MAR. Zone Phone:   [phone]    Significant changes during shift:    Patient's IV infiltrated right after IV dilaudid. Spoke with Dr. Merrick Stevens for a one time order for Dilaudid due to arm and stomach pain. Non-emergent issues for physician to address:   None            Opportunity for questions and clarification was given to oncoming nurse. Patient bed is in fowlers position, side rails are up x 2, door & observation blinds open as needed, call bell within reach and patient not in distress.     Colby Jeffrey

## 2017-05-15 NOTE — PERIOP NOTES
TRANSFER - IN REPORT:    Verbal report received from [facility] on Oliver Chand  being received from [id number] for ordered procedure      Report consisted of patients Situation, Background, Assessment and   Recommendations(SBAR). Information from the following report(s) SBAR, OR Summary, Procedure Summary and MAR was reviewed with the receiving nurse. Opportunity for questions and clarification was provided. Assessment completed upon patients arrival to unit and care assumed.

## 2017-05-16 LAB
ALBUMIN SERPL BCP-MCNC: 2.3 G/DL (ref 3.5–5)
ALBUMIN/GLOB SERPL: 0.6 {RATIO} (ref 1.1–2.2)
ALP SERPL-CCNC: 38 U/L (ref 45–117)
ALT SERPL-CCNC: 9 U/L (ref 12–78)
ANION GAP BLD CALC-SCNC: 5 MMOL/L (ref 5–15)
AST SERPL W P-5'-P-CCNC: 8 U/L (ref 15–37)
BACTERIA SPEC CULT: NORMAL
BASOPHILS # BLD AUTO: 0 K/UL (ref 0–0.1)
BASOPHILS # BLD: 0 % (ref 0–1)
BILIRUB SERPL-MCNC: 0.3 MG/DL (ref 0.2–1)
BUN SERPL-MCNC: 6 MG/DL (ref 6–20)
BUN/CREAT SERPL: 16 (ref 12–20)
CALCIUM SERPL-MCNC: 8.1 MG/DL (ref 8.5–10.1)
CC UR VC: NORMAL
CHLORIDE SERPL-SCNC: 106 MMOL/L (ref 97–108)
CO2 SERPL-SCNC: 30 MMOL/L (ref 21–32)
CREAT SERPL-MCNC: 0.38 MG/DL (ref 0.55–1.02)
EOSINOPHIL # BLD: 0 K/UL (ref 0–0.4)
EOSINOPHIL NFR BLD: 0 % (ref 0–7)
ERYTHROCYTE [DISTWIDTH] IN BLOOD BY AUTOMATED COUNT: 13.8 % (ref 11.5–14.5)
GLOBULIN SER CALC-MCNC: 3.8 G/DL (ref 2–4)
GLUCOSE SERPL-MCNC: 130 MG/DL (ref 65–100)
HCT VFR BLD AUTO: 35.1 % (ref 35–47)
HGB BLD-MCNC: 11.4 G/DL (ref 11.5–16)
LYMPHOCYTES # BLD AUTO: 10 % (ref 12–49)
LYMPHOCYTES # BLD: 1 K/UL (ref 0.8–3.5)
MAGNESIUM SERPL-MCNC: 2.4 MG/DL (ref 1.6–2.4)
MCH RBC QN AUTO: 28.7 PG (ref 26–34)
MCHC RBC AUTO-ENTMCNC: 32.5 G/DL (ref 30–36.5)
MCV RBC AUTO: 88.4 FL (ref 80–99)
MONOCYTES # BLD: 0.7 K/UL (ref 0–1)
MONOCYTES NFR BLD AUTO: 7 % (ref 5–13)
NEUTS SEG # BLD: 8.3 K/UL (ref 1.8–8)
NEUTS SEG NFR BLD AUTO: 83 % (ref 32–75)
PLATELET # BLD AUTO: 461 K/UL (ref 150–400)
POTASSIUM SERPL-SCNC: 4.1 MMOL/L (ref 3.5–5.1)
PROT SERPL-MCNC: 6.1 G/DL (ref 6.4–8.2)
RBC # BLD AUTO: 3.97 M/UL (ref 3.8–5.2)
SERVICE CMNT-IMP: NORMAL
SODIUM SERPL-SCNC: 141 MMOL/L (ref 136–145)
WBC # BLD AUTO: 10 K/UL (ref 3.6–11)

## 2017-05-16 PROCEDURE — 85025 COMPLETE CBC W/AUTO DIFF WBC: CPT | Performed by: COLON & RECTAL SURGERY

## 2017-05-16 PROCEDURE — 74011250637 HC RX REV CODE- 250/637: Performed by: COLON & RECTAL SURGERY

## 2017-05-16 PROCEDURE — 80053 COMPREHEN METABOLIC PANEL: CPT | Performed by: COLON & RECTAL SURGERY

## 2017-05-16 PROCEDURE — 0DTN0ZZ RESECTION OF SIGMOID COLON, OPEN APPROACH: ICD-10-PCS | Performed by: COLON & RECTAL SURGERY

## 2017-05-16 PROCEDURE — 0D1M0Z4 BYPASS DESCENDING COLON TO CUTANEOUS, OPEN APPROACH: ICD-10-PCS | Performed by: COLON & RECTAL SURGERY

## 2017-05-16 PROCEDURE — 74011000250 HC RX REV CODE- 250: Performed by: INTERNAL MEDICINE

## 2017-05-16 PROCEDURE — 36415 COLL VENOUS BLD VENIPUNCTURE: CPT | Performed by: COLON & RECTAL SURGERY

## 2017-05-16 PROCEDURE — 74011250636 HC RX REV CODE- 250/636: Performed by: COLON & RECTAL SURGERY

## 2017-05-16 PROCEDURE — 83735 ASSAY OF MAGNESIUM: CPT | Performed by: COLON & RECTAL SURGERY

## 2017-05-16 PROCEDURE — 74011250636 HC RX REV CODE- 250/636: Performed by: INTERNAL MEDICINE

## 2017-05-16 PROCEDURE — 65270000029 HC RM PRIVATE

## 2017-05-16 PROCEDURE — 74011250637 HC RX REV CODE- 250/637: Performed by: INTERNAL MEDICINE

## 2017-05-16 PROCEDURE — 74011000258 HC RX REV CODE- 258: Performed by: INTERNAL MEDICINE

## 2017-05-16 RX ORDER — LORAZEPAM 2 MG/ML
1 INJECTION INTRAMUSCULAR ONCE
Status: COMPLETED | OUTPATIENT
Start: 2017-05-16 | End: 2017-05-16

## 2017-05-16 RX ADMIN — VALSARTAN 160 MG: 160 TABLET ORAL at 08:24

## 2017-05-16 RX ADMIN — DEXTROSE MONOHYDRATE, SODIUM CHLORIDE, AND POTASSIUM CHLORIDE: 50; 9; 2.98 INJECTION, SOLUTION INTRAVENOUS at 16:17

## 2017-05-16 RX ADMIN — METRONIDAZOLE 500 MG: 500 INJECTION, SOLUTION INTRAVENOUS at 08:24

## 2017-05-16 RX ADMIN — Medication 10 ML: at 13:25

## 2017-05-16 RX ADMIN — Medication 10 ML: at 22:48

## 2017-05-16 RX ADMIN — HYDROMORPHONE HYDROCHLORIDE 1 MG: 2 INJECTION, SOLUTION INTRAMUSCULAR; INTRAVENOUS; SUBCUTANEOUS at 11:24

## 2017-05-16 RX ADMIN — CEFEPIME HYDROCHLORIDE 2 G: 2 INJECTION, POWDER, FOR SOLUTION INTRAVENOUS at 05:12

## 2017-05-16 RX ADMIN — HYDROMORPHONE HYDROCHLORIDE 1 MG: 2 INJECTION, SOLUTION INTRAMUSCULAR; INTRAVENOUS; SUBCUTANEOUS at 00:25

## 2017-05-16 RX ADMIN — Medication 10 ML: at 05:13

## 2017-05-16 RX ADMIN — CEFEPIME HYDROCHLORIDE 2 G: 2 INJECTION, POWDER, FOR SOLUTION INTRAVENOUS at 21:30

## 2017-05-16 RX ADMIN — ENOXAPARIN SODIUM 40 MG: 40 INJECTION SUBCUTANEOUS at 08:24

## 2017-05-16 RX ADMIN — HYDROMORPHONE HYDROCHLORIDE 1 MG: 2 INJECTION, SOLUTION INTRAMUSCULAR; INTRAVENOUS; SUBCUTANEOUS at 14:17

## 2017-05-16 RX ADMIN — CEFEPIME HYDROCHLORIDE 2 G: 2 INJECTION, POWDER, FOR SOLUTION INTRAVENOUS at 13:24

## 2017-05-16 RX ADMIN — HYDROMORPHONE HYDROCHLORIDE 1 MG: 2 INJECTION, SOLUTION INTRAMUSCULAR; INTRAVENOUS; SUBCUTANEOUS at 20:36

## 2017-05-16 RX ADMIN — OXYCODONE HYDROCHLORIDE AND ACETAMINOPHEN 2 TABLET: 5; 325 TABLET ORAL at 20:49

## 2017-05-16 RX ADMIN — HYDROMORPHONE HYDROCHLORIDE 1 MG: 2 INJECTION, SOLUTION INTRAMUSCULAR; INTRAVENOUS; SUBCUTANEOUS at 17:25

## 2017-05-16 RX ADMIN — METRONIDAZOLE 500 MG: 500 INJECTION, SOLUTION INTRAVENOUS at 16:10

## 2017-05-16 RX ADMIN — HYDROMORPHONE HYDROCHLORIDE 1 MG: 2 INJECTION, SOLUTION INTRAMUSCULAR; INTRAVENOUS; SUBCUTANEOUS at 02:16

## 2017-05-16 RX ADMIN — LORAZEPAM 1 MG: 2 INJECTION INTRAMUSCULAR; INTRAVENOUS at 22:45

## 2017-05-16 RX ADMIN — Medication 10 ML: at 00:27

## 2017-05-16 RX ADMIN — HYDROMORPHONE HYDROCHLORIDE 1 MG: 2 INJECTION, SOLUTION INTRAMUSCULAR; INTRAVENOUS; SUBCUTANEOUS at 08:16

## 2017-05-16 RX ADMIN — HYDROMORPHONE HYDROCHLORIDE 1 MG: 2 INJECTION, SOLUTION INTRAMUSCULAR; INTRAVENOUS; SUBCUTANEOUS at 20:32

## 2017-05-16 RX ADMIN — OXYCODONE HYDROCHLORIDE AND ACETAMINOPHEN 1 TABLET: 5; 325 TABLET ORAL at 07:02

## 2017-05-16 RX ADMIN — HYDROMORPHONE HYDROCHLORIDE 1 MG: 2 INJECTION, SOLUTION INTRAMUSCULAR; INTRAVENOUS; SUBCUTANEOUS at 05:13

## 2017-05-16 RX ADMIN — METRONIDAZOLE 500 MG: 500 INJECTION, SOLUTION INTRAVENOUS at 00:27

## 2017-05-16 NOTE — PROGRESS NOTES
No stoma function yet  Visit Vitals    /84    Pulse 72    Temp 98.1 °F (36.7 °C)    Resp 18    Ht 5' 2\" (1.575 m)    Wt 85.3 kg (188 lb)    SpO2 98%    BMI 34.39 kg/m2       Date 05/15/17 0700 - 05/16/17 0659 05/16/17 0700 - 05/17/17 0659   Shift 0700-1859 1900-0659 24 Hour Total 0700-1859 1900-0659 24 Hour Total   I  N  T  A  K  E   P. O. 0  0         P. O. 0  0       I.V.  (mL/kg/hr) 1000  (1) 5228.3  (5.1) 6228.3  (3)         Volume (dextrose 5% - 0.9% NaCl with KCl 40 mEq/L infusion)  3028.3 3028. 3         Volume (lactated ringers infusion) 8996 812 9168         Volume (lactated ringers infusion)  1000 1000         Volume (metroNIDAZOLE (FLAGYL) IVPB premix 500 mg)  300 300         Volume (cefepime (MAXIPIME) 2 g in 0.9% sodium chloride (MBP/ADV) 100 mL MBP)  200 200       Shift Total  (mL/kg) 1000  (11.7) 5228.3  (61.3) 6228.3  (73)      O  U  T  P  U  T   Urine  (mL/kg/hr) 900  (0.9) 2100  (2.1) 3000  (1.5)         Urine Voided 900  900         Urine Output (mL) (Urinary Catheter 05/15/17 Mcneil)  2100 2100       Drains  230 230 50  50      Output (ml) (Elvin Drain #1 05/15/17 Anterior Abdomen)  230 230 50  50    Stool            Stool Occurrence(s) 1 x  1 x       Blood 450  450         Estimated Blood Loss 450  450       Shift Total  (mL/kg) 1350  (15.8) 2330  (27.3) 3680  (43.2) 50  (0.6)  50  (0.6)   NET -350 2898.3 2548. 3 -50  -50   Weight (kg) 85.3 85.3 85.3 85.3 85.3 85.3     abd soft   Stoma pink  danna serosang    A/p ambulate  Continue mcneil cath  Continue abx  Advance diet

## 2017-05-16 NOTE — PERIOP NOTES
TRANSFER - OUT REPORT:    Verbal report given to Graciela (name) on Bayron Loredo  being transferred to [facility](unit) for routine post - op       Report consisted of patients Situation, Background, Assessment and   Recommendations(SBAR). Information from the following report(s) SBAR, Kardex, OR Summary, Intake/Output, MAR and Recent Results was reviewed with the receiving nurse. Opportunity for questions and clarification was provided.       Patient transported with:   O2 @ 2 liters  Registered Nurse  [facility]

## 2017-05-16 NOTE — PROGRESS NOTES
End of Shift Nursing Note    Bedside shift change report given to Alivia Frias (oncoming nurse) by Vanessa Ascencio RN (offgoing nurse). Report included the following information SBAR, Kardex, MAR and Recent Results. Zone Phone:   [phone]    Significant changes during shift:    None   Non-emergent issues for physician to address:   none     Number times ambulated in hallway past shift: 2      Number of times OOB to chair past shift: 2    POD #: Admitted 5-14-17    Vital Signs:    Temp: 97.9 °F (36.6 °C)     Pulse (Heart Rate): 76     BP: 158/78     Resp Rate: 18     O2 Sat (%): 98 %    Lines & Drains:     Urinary Catheter? Yes   Placement Date: 5/15/17   Medical Necessity: yes  Central Line? Yes   Placement Date: 5/15/17   Medical Necessity: yes    NG tube [] in [] removed [x] not applicable   Drains [] in [] removed [x] not applicable     Skin Integrity:      Wounds: yes   Dressings Present: yes    Wound Concerns: no      GI:    Current diet:  DIET CLEAR LIQUID    Nausea: NO  Vomiting: NO  Bowel Sounds: YES  Flatus: NO  Last Bowel Movement: yesterday    Respiratory:  Supplemental O2: No       Incentive Spirometer: YES   Coughing and Deep Breathing: YES  Oral Care: YES  Understanding (patient/family education): YES   Getting out of bed: YES  Head of bed elevation: YES    Patient Safety:    Falls Score: 1  Mobility Score: 1  Bed Alarm On? No  Sitter? No      Opportunity for questions and clarification was given to oncoming nurse. Patient bed is in low position, side rails are up x 2, call bell within reach and patient not in distress.     Jovita Grajeda

## 2017-05-16 NOTE — PROGRESS NOTES
Hospitalist Progress Note    NAME: Gabrielle Shea   :  1965   MRN:  [id number]       Interim Hospital Summary: 46 y.o. female whom presented on 2017 with      Assessment / Plan:    Acute Diverticulitis with perforation POA  -CT abdomen  Severe sigmoid colon diverticulitis now has perforated, and there are small fluid collections adjacent to the affected loops of sigmoid colon. Moderate pelvic ascites has worsened mildly. There is no identifiable colovesical fistula. -S/P sigmoid colectomy with end colostomy 5/15/17  -continue empiric IV antibiotics. Leukocytosis resolved  -on clears    UTI ?  -abnormal UA due to contiguous infection or fistula. On Abx    Essential HTN POA   -c/w Diovan use labetalol prn. Tobacco use POA    - Nicotine patch    Morbid obesity POA      Next of Kin:   Body mass index is 34.39 kg/(m^2). Code status: Full  Prophylaxis: SCD's  Recommended Disposition:  PT, OT, RN     Subjective:     Chief Complaint / Reason for Physician Visit  Follow up of HTN, perforated diverticulitis   Had surgery last night. Review of Systems:  Symptom Y/N Comments  Symptom Y/N Comments   Fever/Chills    Chest Pain     Poor Appetite    Edema     Cough    Abdominal Pain y    Sputum    Joint Pain     SOB/MORAES    Pruritis/Rash     Nausea/vomit    Tolerating PT/OT     Diarrhea    Tolerating Diet     Constipation    Other       Could NOT obtain due to:      Objective:     VITALS:   Last 24hrs VS reviewed since prior progress note.  Most recent are:  Patient Vitals for the past 24 hrs:   Temp Pulse Resp BP SpO2   17 0822 98.1 °F (36.7 °C) 72 18 155/84 -   17 0541 98 °F (36.7 °C) 84 18 150/84 98 %   17 0033 98 °F (36.7 °C) 84 18 150/84 98 %   05/15/17 2225 98 °F (36.7 °C) 80 16 148/76 99 %   05/15/17 2155 97.8 °F (36.6 °C) 80 18 148/74 99 %   05/15/17 2120 98 °F (36.7 °C) 84 16 146/78 99 %   05/15/17 2055 97.9 °F (36.6 °C) 86 16 (!) 150/103 99 %   05/15/17 2020 97.8 °F (36.6 °C) 93 15 156/84 97 %   05/15/17 2000 - 89 18 157/85 98 %   05/15/17 1945 - 78 20 155/81 99 %   05/15/17 1935 - 82 23 147/76 98 %   05/15/17 1930 - 84 25 152/80 99 %   05/15/17 1925 - 80 27 145/86 99 %   05/15/17 1920 - 81 17 153/74 97 %   05/15/17 1917 97.8 °F (36.6 °C) 81 19 145/83 97 %   05/15/17 1915 - 83 23 145/83 97 %   05/15/17 1912 - - - 156/82 -   05/15/17 1539 98.7 °F (37.1 °C) 88 16 (!) 160/99 95 %   05/15/17 1141 98.7 °F (37.1 °C) 90 18 133/87 96 %       Intake/Output Summary (Last 24 hours) at 05/16/17 1036  Last data filed at 05/16/17 0831   Gross per 24 hour   Intake          6228.33 ml   Output             3430 ml   Net          2798.33 ml        PHYSICAL EXAM:  General: WD, WN. Alert, cooperative, no acute distress    EENT:  EOMI. Anicteric sclerae. MMM  Resp:  CTA bilaterally, no wheezing or rales. No accessory muscle use  CV:  Regular  rhythm,  No edema  GI:  Soft, Non distended, + mildly tender  +Bowel sounds  Neurologic:  Alert and oriented X 3, normal speech,   Psych:   Good insight. Not anxious nor agitated  Skin:  No rashes. No jaundice    Reviewed most current lab test results and cultures  YES  Reviewed most current radiology test results   YES  Review and summation of old records today    NO  Reviewed patient's current orders and MAR    YES  PMH/SH reviewed - no change compared to H&P  ________________________________________________________________________  Care Plan discussed with:    Comments   Patient y    Family      RN y    Care Manager     Consultant                        Multidiciplinary team rounds were held today with , nursing, pharmacist and clinical coordinator. Patient's plan of care was discussed; medications were reviewed and discharge planning was addressed.      ________________________________________________________________________  Total NON critical care TIME: 25   Minutes    Total CRITICAL CARE TIME Spent:   Minutes non procedure based      Comments   >50% of visit spent in counseling and coordination of care     ________________________________________________________________________  Ted Mark MD     Procedures: see electronic medical records for all procedures/Xrays and details which were not copied into this note but were reviewed prior to creation of Plan. LABS:  I reviewed today's most current labs and imaging studies.   Pertinent labs include:  Recent Labs      05/16/17   0530  05/15/17   1926  05/15/17   0322   WBC  10.0  16.2*  11.3*   HGB  11.4*  11.5  11.6   HCT  35.1  35.3  35.0   PLT  461*  444*  448*     Recent Labs      05/16/17   0530  05/15/17   0322  05/14/17   1442   NA  141  140  138   K  4.1  3.5  3.4*   CL  106  108  103   CO2  30  22  28   GLU  130*  110*  137*   BUN  6  8  10   CREA  0.38*  0.43*  0.51*   CA  8.1*  7.8*  8.3*   MG  2.4   --    --    ALB  2.3*  2.2*  2.6*   TBILI  0.3  0.3  0.2   SGOT  8*  7*  8*   ALT  9*  9*  9*       Signed: Ted Mark MD

## 2017-05-16 NOTE — OP NOTES
[address], [address]   OP NOTE       Name:  Diamond Lebron   MR#:  [id number]   :  1965   Account #:  [de-identified]    Surgery Date:  05/15/2017   Date of Adm:  2017       PREOPERATIVE DIAGNOSIS: Perforated diverticulitis. POSTOPERATIVE DIAGNOSIS: Perforated diverticulitis. PROCEDURE PERFORMED: Sigmoid colectomy with end colostomy. SURGEON: Mary Jane Little MD    ANESTHESIA: General plus 30 mL of 0.25% Marcaine with   epinephrine. ESTIMATED BLOOD LOSS: 450 mL. SPECIMENS REMOVED: Sigmoid colon sent to Pathology. DISPOSITION: The patient tolerated the procedure well and was   transferred to recovery in stable condition. DRAINS: A 19 round Elvin drain left in the right lower quadrant,   colostomy in the left lower quadrant. INDICATIONS: This is a 75-year-old female who has history of   diverticulitis. She was on the schedule for an elective colectomy   several months ago. She canceled it. She has been admitted to the   hospital several times over the last week or two with worsening pain   and drainage from her vagina. Risks, benefits, and alternatives were   discussed regarding colectomy plus colostomy. We would attempt this   laparoscopically, but would require open incision if necessary. DESCRIPTION OF PROCEDURE: The patient was taken to the   operating room, placed on the table in normal supine position. After   induction of anesthesia, the patient was placed in lithotomy position. Abdomen was prepped and draped in the usual fashion. Time-out was   performed. Lower midline incision was made. There was a large   phlegmon. I was able to enter the abdomen, taking care to avoid   underlying bowel. Upon entering the abdomen, I saw that it would be   nearly impossible to resect this phlegmonous mass laparoscopically.    Therefore, we just made a large vertical midline incision and I placed a   large wound protector. I was able to identify normal sigmoid colon   along the left lateral gutter, and there was a large phlegmonous mass   adherent to the left and right pelvic side wall as well as densely   adherent to the retroperitoneum at the sacral promontory. This was   also adherent to the uterus all the way down to the mid rectum. I had to   finger fracture and used Bovie cautery to get this mass off of anterior   left and right side walls. I used the LigaSure to take the mesentery. I   used a TX 30 mm green load to come across the rectum where it   was soft just near the posterior vaginal wall. I transected the sigmoid   colon proximally as well and the specimen was passed off the table. The abdomen was irrigated copiously with saline. I then made a   colostomy site in the left lower quadrant and brought the colon out this   way and attached to a dea clamp. I placed a 19 round Elvin drain. I made sure   there was good correct sponge count. I then placed several sheets of   Seprafilm in the abdomen and closed the incision using 3-0 Vicryl to   close the peritoneum and #1 PDS to close the fascia. I did lay a 19   round Elvin drain in the pelvis prior to closing. Staples were used to   close the skin. Sterile dressings were applied. The stoma was matured   using 3-0 Vicryl in a  Shefali colostomy fashion. Stoma appliance was   placed. The patient tolerated the procedure well and was transferred to   recovery room in stable condition.         MD MANI Mcdonald / KIERA   D:  05/15/2017   20:16   T:  05/16/2017   09:41   Job #:  [id number]

## 2017-05-16 NOTE — PROGRESS NOTES
Visited by Ema Johnson Partner on May 16, 2017. Heather Jarquin, [facility] Paging Service  [phone])

## 2017-05-16 NOTE — ANESTHESIA POSTPROCEDURE EVALUATION
Post-Anesthesia Evaluation and Assessment    Patient: Jennifer Youngblood MRN: [id number]  SSN: [id number]    YOB: 1965  Age: 46 y.o. Sex: female       Cardiovascular Function/Vital Signs  Visit Vitals    /85    Pulse 89    Temp 36.6 °C (97.8 °F)    Resp 18    Ht 5' 2\" (1.575 m)    Wt 85.3 kg (188 lb)    SpO2 98%    BMI 34.39 kg/m2       Patient is status post general anesthesia for Procedure(s):  LAPAROSCOPIC  CONVERTED TO OPEN SIGMOID COLECTOMY WITH END COLOSTOMY. Nausea/Vomiting: None    Postoperative hydration reviewed and adequate. Pain:  Pain Scale 1: Numeric (0 - 10) (05/15/17 1954)  Pain Intensity 1: 4 (05/15/17 1954)   Managed    Neurological Status:   Neuro (WDL): Within Defined Limits (05/15/17 1917)   At baseline    Mental Status and Level of Consciousness: Arousable    Pulmonary Status:   O2 Device: Nasal cannula (05/15/17 1917)   Adequate oxygenation and airway patent    Complications related to anesthesia: None    Post-anesthesia assessment completed.  No concerns    Signed By: Alexia Guallpa MD     May 15, 2017

## 2017-05-16 NOTE — PROGRESS NOTES
End of Shift Nursing Note    Bedside shift change report given to Sunny Villeda (oncoming nurse) by Neva Mota RN (offgoing nurse). Report included the following information SBAR, Kardex, MAR and Recent Results. Zone Phone:   [phone]    Significant changes during shift:    none   Non-emergent issues for physician to address:   none     Number times ambulated in hallway past shift: 0      Number of times OOB to chair past shift: 0    POD #: 1     Vital Signs:    Temp: 98 °F (36.7 °C)     Pulse (Heart Rate): 80     BP: 148/76     Resp Rate: 16     O2 Sat (%): 99 %    Lines & Drains:     Urinary Catheter? Yes   Placement Date: 5/15/17   Medical Necessity: yes  Central Line? Yes   Placement Date: 5/15/17   Medical Necessity: yes    NG tube [] in [] removed [x] not applicable   Drains [x] in [] removed [] not applicable     Skin Integrity:      Wounds: yes   Dressings Present: yes    Wound Concerns: no      GI:    Current diet:  DIET CLEAR LIQUID    Nausea: NO  Vomiting: NO  Bowel Sounds: YES  Flatus: NO  Last Bowel Movement: yesterday    Respiratory:  Supplemental O2: No       Incentive Spirometer: YES   Coughing and Deep Breathing: YES  Oral Care: YES  Understanding (patient/family education): YES   Getting out of bed: NO  Head of bed elevation: YES    Patient Safety:    Falls Score: 1  Mobility Score: 1  Bed Alarm On? No  Sitter? No      Opportunity for questions and clarification was given to oncoming nurse. Patient bed is in low position, side rails are up x 2, door & observation blinds open as needed, call bell within reach and patient not in distress.     Isabel Cali RN

## 2017-05-16 NOTE — WOUND CARE
Ostomy care: patient is POD#1 from sigmoid colectomy with end colostomy secondary to severe diverticulitis. 47 y/o CF with HTN, Smoker, morbid obesity (5'2\", 188 lbs). Ostomy nurse introduced self and services to patient who is A&Ox3 and sitting up in chair. Ostomy nurse explained what her anatomy was like before the operation and what it is like now. Patient refused to look at medical pictures, stating she is \"too squeamish\". Ostomy nurse stated that her current situation can be overwhelming, but it is her body and she will need to take care of it for the next 4-6 months until her ostomy can be taken down. Stoma: appear, pink, moist and flat under pouch. No gas or stool yet. Plan: Patient is gonna need firm guidance in her ostomy care. Will provide more education and possibly change the pouch tomorrow.   Vee Roman RN, CWON, zone ph# [phone]

## 2017-05-17 LAB
ANION GAP BLD CALC-SCNC: 7 MMOL/L (ref 5–15)
BACTERIA SPEC CULT: ABNORMAL
BUN SERPL-MCNC: 10 MG/DL (ref 6–20)
BUN/CREAT SERPL: 26 (ref 12–20)
CALCIUM SERPL-MCNC: 7.6 MG/DL (ref 8.5–10.1)
CHLORIDE SERPL-SCNC: 109 MMOL/L (ref 97–108)
CO2 SERPL-SCNC: 27 MMOL/L (ref 21–32)
CREAT SERPL-MCNC: 0.38 MG/DL (ref 0.55–1.02)
GLUCOSE SERPL-MCNC: 112 MG/DL (ref 65–100)
GRAM STN SPEC: ABNORMAL
GRAM STN SPEC: ABNORMAL
POTASSIUM SERPL-SCNC: 3.9 MMOL/L (ref 3.5–5.1)
SERVICE CMNT-IMP: ABNORMAL
SODIUM SERPL-SCNC: 143 MMOL/L (ref 136–145)

## 2017-05-17 PROCEDURE — 80048 BASIC METABOLIC PNL TOTAL CA: CPT | Performed by: INTERNAL MEDICINE

## 2017-05-17 PROCEDURE — 77030011641 HC PASTE OST ADH BMS -A

## 2017-05-17 PROCEDURE — 74011250636 HC RX REV CODE- 250/636: Performed by: INTERNAL MEDICINE

## 2017-05-17 PROCEDURE — 65270000029 HC RM PRIVATE

## 2017-05-17 PROCEDURE — 77030010522

## 2017-05-17 PROCEDURE — 77030010541

## 2017-05-17 PROCEDURE — 74011250636 HC RX REV CODE- 250/636: Performed by: COLON & RECTAL SURGERY

## 2017-05-17 PROCEDURE — 74011000250 HC RX REV CODE- 250: Performed by: INTERNAL MEDICINE

## 2017-05-17 PROCEDURE — 74011250637 HC RX REV CODE- 250/637: Performed by: INTERNAL MEDICINE

## 2017-05-17 PROCEDURE — 77030010520

## 2017-05-17 PROCEDURE — 74011250637 HC RX REV CODE- 250/637: Performed by: COLON & RECTAL SURGERY

## 2017-05-17 PROCEDURE — 74011000258 HC RX REV CODE- 258: Performed by: INTERNAL MEDICINE

## 2017-05-17 PROCEDURE — 36415 COLL VENOUS BLD VENIPUNCTURE: CPT | Performed by: INTERNAL MEDICINE

## 2017-05-17 RX ORDER — DOCUSATE SODIUM 100 MG/1
100 CAPSULE, LIQUID FILLED ORAL AS NEEDED
COMMUNITY
End: 2017-09-13

## 2017-05-17 RX ORDER — METRONIDAZOLE 250 MG/1
500 TABLET ORAL EVERY 8 HOURS
Status: DISCONTINUED | OUTPATIENT
Start: 2017-05-17 | End: 2017-05-18 | Stop reason: ALTCHOICE

## 2017-05-17 RX ORDER — HYDROMORPHONE HYDROCHLORIDE 2 MG/ML
1 INJECTION, SOLUTION INTRAMUSCULAR; INTRAVENOUS; SUBCUTANEOUS
Status: DISCONTINUED | OUTPATIENT
Start: 2017-05-17 | End: 2017-05-24 | Stop reason: HOSPADM

## 2017-05-17 RX ORDER — IBUPROFEN 200 MG
200 TABLET ORAL
COMMUNITY

## 2017-05-17 RX ORDER — DIPHENHYDRAMINE HCL 25 MG
25 CAPSULE ORAL
COMMUNITY

## 2017-05-17 RX ADMIN — METRONIDAZOLE 500 MG: 500 INJECTION, SOLUTION INTRAVENOUS at 00:28

## 2017-05-17 RX ADMIN — HYDROMORPHONE HYDROCHLORIDE 1 MG: 2 INJECTION, SOLUTION INTRAMUSCULAR; INTRAVENOUS; SUBCUTANEOUS at 23:48

## 2017-05-17 RX ADMIN — METRONIDAZOLE 500 MG: 250 TABLET ORAL at 14:11

## 2017-05-17 RX ADMIN — ONDANSETRON HYDROCHLORIDE 4 MG: 2 INJECTION, SOLUTION INTRAMUSCULAR; INTRAVENOUS at 21:58

## 2017-05-17 RX ADMIN — HYDROMORPHONE HYDROCHLORIDE 1 MG: 2 INJECTION, SOLUTION INTRAMUSCULAR; INTRAVENOUS; SUBCUTANEOUS at 10:55

## 2017-05-17 RX ADMIN — METRONIDAZOLE 500 MG: 250 TABLET ORAL at 21:58

## 2017-05-17 RX ADMIN — Medication 10 ML: at 06:58

## 2017-05-17 RX ADMIN — OXYCODONE HYDROCHLORIDE AND ACETAMINOPHEN 1 TABLET: 5; 325 TABLET ORAL at 08:52

## 2017-05-17 RX ADMIN — ENOXAPARIN SODIUM 40 MG: 40 INJECTION SUBCUTANEOUS at 08:42

## 2017-05-17 RX ADMIN — Medication 10 ML: at 14:11

## 2017-05-17 RX ADMIN — ONDANSETRON HYDROCHLORIDE 4 MG: 2 INJECTION, SOLUTION INTRAMUSCULAR; INTRAVENOUS at 18:09

## 2017-05-17 RX ADMIN — Medication 10 ML: at 21:59

## 2017-05-17 RX ADMIN — HYDROMORPHONE HYDROCHLORIDE 1 MG: 2 INJECTION, SOLUTION INTRAMUSCULAR; INTRAVENOUS; SUBCUTANEOUS at 21:58

## 2017-05-17 RX ADMIN — HYDROMORPHONE HYDROCHLORIDE 1 MG: 2 INJECTION, SOLUTION INTRAMUSCULAR; INTRAVENOUS; SUBCUTANEOUS at 15:50

## 2017-05-17 RX ADMIN — CEFEPIME HYDROCHLORIDE 2 G: 2 INJECTION, POWDER, FOR SOLUTION INTRAVENOUS at 13:46

## 2017-05-17 RX ADMIN — DEXTROSE MONOHYDRATE, SODIUM CHLORIDE, AND POTASSIUM CHLORIDE: 50; 9; 2.98 INJECTION, SOLUTION INTRAVENOUS at 18:59

## 2017-05-17 RX ADMIN — METRONIDAZOLE 500 MG: 500 INJECTION, SOLUTION INTRAVENOUS at 08:41

## 2017-05-17 RX ADMIN — VALSARTAN 160 MG: 160 TABLET ORAL at 08:52

## 2017-05-17 RX ADMIN — CEFEPIME HYDROCHLORIDE 2 G: 2 INJECTION, POWDER, FOR SOLUTION INTRAVENOUS at 06:30

## 2017-05-17 RX ADMIN — HYDROMORPHONE HYDROCHLORIDE 1 MG: 2 INJECTION, SOLUTION INTRAMUSCULAR; INTRAVENOUS; SUBCUTANEOUS at 13:48

## 2017-05-17 RX ADMIN — HYDROMORPHONE HYDROCHLORIDE 1 MG: 2 INJECTION, SOLUTION INTRAMUSCULAR; INTRAVENOUS; SUBCUTANEOUS at 18:08

## 2017-05-17 RX ADMIN — CEFEPIME HYDROCHLORIDE 2 G: 2 INJECTION, POWDER, FOR SOLUTION INTRAVENOUS at 21:59

## 2017-05-17 RX ADMIN — HYDROMORPHONE HYDROCHLORIDE 1 MG: 2 INJECTION, SOLUTION INTRAMUSCULAR; INTRAVENOUS; SUBCUTANEOUS at 19:59

## 2017-05-17 RX ADMIN — HYDROMORPHONE HYDROCHLORIDE 1 MG: 2 INJECTION, SOLUTION INTRAMUSCULAR; INTRAVENOUS; SUBCUTANEOUS at 08:37

## 2017-05-17 RX ADMIN — DEXTROSE MONOHYDRATE, SODIUM CHLORIDE, AND POTASSIUM CHLORIDE: 50; 9; 2.98 INJECTION, SOLUTION INTRAVENOUS at 04:31

## 2017-05-17 RX ADMIN — HYDROMORPHONE HYDROCHLORIDE 1 MG: 2 INJECTION, SOLUTION INTRAMUSCULAR; INTRAVENOUS; SUBCUTANEOUS at 02:08

## 2017-05-17 NOTE — PROGRESS NOTES
Hospitalist Progress Note    NAME: Elizabeth Griffith   :  1965   MRN:  [id number]       Interim Hospital Summary: 46 y.o. female whom presented on 2017 with      Assessment / Plan:    Acute Diverticulitis with perforation POA  -CT abdomen  Severe sigmoid colon diverticulitis now has perforated, and there are small fluid collections adjacent to the affected loops of sigmoid colon. Moderate pelvic ascites has worsened mildly. There is no identifiable colovesical fistula. -S/P sigmoid colectomy with end colostomy 5/15/17  -continue empiric IV antibiotics. Leukocytosis resolved  -on clears. -increase dilaudid to every 2 hours. -mcneil and central line out at some point    UTI ?  -abnormal UA due to contiguous infection or fistula. On Abx    Essential HTN POA   -c/w Diovan use labetalol prn. Tobacco use POA    -Nicotine patch    Morbid obesity POA      Next of Kin:   Body mass index is 34.39 kg/(m^2). Code status: Full  Prophylaxis: SCD's  Recommended Disposition:  PT, OT, RN     Subjective:     Chief Complaint / Reason for Physician Visit  Follow up of HTN, perforated diverticulitis   Pain not well controlled with q3prn dosing    Review of Systems:  Symptom Y/N Comments  Symptom Y/N Comments   Fever/Chills    Chest Pain     Poor Appetite    Edema     Cough    Abdominal Pain y    Sputum    Joint Pain     SOB/MORAES    Pruritis/Rash     Nausea/vomit    Tolerating PT/OT     Diarrhea    Tolerating Diet     Constipation    Other       Could NOT obtain due to:      Objective:     VITALS:   Last 24hrs VS reviewed since prior progress note.  Most recent are:  Patient Vitals for the past 24 hrs:   Temp Pulse Resp BP SpO2   17 0630 98 °F (36.7 °C) 70 18 156/70 97 %   17 1920 98 °F (36.7 °C) 74 18 150/74 98 %   17 1622 97.9 °F (36.6 °C) 76 18 158/78 98 %       Intake/Output Summary (Last 24 hours) at 17 1034  Last data filed at 17 0442   Gross per 24 hour   Intake 3041.67 ml   Output             1070 ml   Net          1971.67 ml        PHYSICAL EXAM:  General: WD, WN. Alert, cooperative, no acute distress    EENT:  EOMI. Anicteric sclerae. MMM  Resp:  CTA bilaterally, no wheezing or rales. No accessory muscle use  CV:  Regular  rhythm,  No edema  GI:  Soft, Non distended, + mildly tender  +Bowel sounds  Neurologic:  Alert and oriented X 3, normal speech,   Psych:   Good insight. Not anxious nor agitated  Skin:  No rashes. No jaundice    Reviewed most current lab test results and cultures  YES  Reviewed most current radiology test results   YES  Review and summation of old records today    NO  Reviewed patient's current orders and MAR    YES  PMH/SH reviewed - no change compared to H&P  ________________________________________________________________________  Care Plan discussed with:    Comments   Patient y    Family      RN y    Care Manager     Consultant                        Multidiciplinary team rounds were held today with , nursing, pharmacist and clinical coordinator. Patient's plan of care was discussed; medications were reviewed and discharge planning was addressed. ________________________________________________________________________  Total NON critical care TIME: 25   Minutes    Total CRITICAL CARE TIME Spent:   Minutes non procedure based      Comments   >50% of visit spent in counseling and coordination of care     ________________________________________________________________________  Ted Mark MD     Procedures: see electronic medical records for all procedures/Xrays and details which were not copied into this note but were reviewed prior to creation of Plan. LABS:  I reviewed today's most current labs and imaging studies.   Pertinent labs include:  Recent Labs      05/16/17   0530  05/15/17   1926  05/15/17   0322   WBC  10.0  16.2*  11.3*   HGB  11.4*  11.5  11.6   HCT  35.1  35.3  35.0   PLT  461*  444*  448* Recent Labs      05/17/17   0500  05/16/17   0530  05/15/17   0322  05/14/17   1442   NA  143  141  140  138   K  3.9  4.1  3.5  3.4*   CL  109*  106  108  103   CO2  27  30  22  28   GLU  112*  130*  110*  137*   BUN  10  6  8  10   CREA  0.38*  0.38*  0.43*  0.51*   CA  7.6*  8.1*  7.8*  8.3*   MG   --   2.4   --    --    ALB   --   2.3*  2.2*  2.6*   TBILI   --   0.3  0.3  0.2   SGOT   --   8*  7*  8*   ALT   --   9*  9*  9*       Signed: Keon Sharma MD

## 2017-05-17 NOTE — PROGRESS NOTES
Pharmacy IV to PO Conversion Program    IV to PO conversion of Metronidazole 500mg IV q8h to Metronidazole 500mg PO q8h      Recent Labs      17   0500  17   0530  05/15/17   1926  05/15/17   0322   CREA  0.38*  0.38*   --   0.43*   BUN  10  6   --   8   WBC   --   10.0  16.2*  11.3*     Temp (24hrs), Av °F (36.7 °C), Min:97.9 °F (36.6 °C), Max:98 °F (36.7 °C)      Criteria for IV to PO switch - Antibiotics  Received IV therapy for at least 48 hours and   Yes   1. Functioning GI tract  a. Taking scheduled oral medications  b. Tolerating tube feeds at goal rate or a full liquid, soft, or regular diet  c. Patient has no documented malabsorption syndrome  d. Patient has not experienced emesis or severe diarrhea within the past 24 hours     Yes   2. Clinically Stable  a. Afebrile (temperature < 100.4°F or 38°C) for at least 24 hours  b. White blood count is trending down towards normal range     Yes            3.   Does not have an exclusion criteria (See list below) Yes         Thanks,    Leobardo Bourgeois, [facility]

## 2017-05-17 NOTE — WOUND CARE
Ostomy care; POD#2 Colostomy. Patient A&Ox3 and reports pain in better control today. Patient has IV fluids, RLQ KATHARINE with sero-sang drainage and Dr Jessie Smith ordered mcneil out today. Ostomy nurse arranged to meet with patients  and patient tomorrow. Patient is not \"doing well\" accepting her temporary colostomy and says just talking about it makes her \"quesy\". Stoma: pink, flat and oval. NO flatus and NO stool yet. Measured 1' vertical and 1 1/2' horizontal today. New one piece cut to fit pouch # [id number] placed with stoma ring for light convexity. Patient will need convex pouches after discharge. Plan: meet for colostomy education tomorrow.   Marie Lam RN, CWON, zone ph# [phone]

## 2017-05-17 NOTE — PROGRESS NOTES
+ flatus in stoma  Visit Vitals    /64 (BP 1 Location: Left arm)    Pulse 64    Temp 97.4 °F (36.3 °C)    Resp 18    Ht 5' 2\" (1.575 m)    Wt 85.3 kg (188 lb)    SpO2 98%    BMI 34.39 kg/m2       Date 05/16/17 0700 - 05/17/17 0659 05/17/17 0700 - 05/18/17 0659   Shift 8462-4426 3662-2539 24 Hour Total 7560-4865 8811-7269 24 Hour Total   I  N  T  A  K  E   P.O. 400 240 640 480  480      P. O. 400 240 640 480  480    I.V.  (mL/kg/hr) 1345  (1.3) 1306.7  (1.3) 2651.7  (1.3)         Volume (dextrose 5% - 0.9% NaCl with KCl 40 mEq/L infusion) 1145 1006.7 2151.7         Volume (metroNIDAZOLE (FLAGYL) IVPB premix 500 mg) 200 100 300         Volume (cefepime (MAXIPIME) 2 g in 0.9% sodium chloride (MBP/ADV) 100 mL MBP)  200 200       Shift Total  (mL/kg) 1745  (20.5) 1546.7  (18.1) 3291.7  (38.6) 480  (5.6)  480  (5.6)   O  U  T  P  U  T   Urine  (mL/kg/hr) 350  (0.3) 600  (0.6) 950  (0.5) 1900  1900      Urine Voided    1500  1500      Urine Output (mL) (Urinary Catheter 05/15/17 Mcneil) 350 600 950 400  400    Drains 120 50 170 30  30      Output (ml) (Elvin Drain #1 05/15/17 Anterior Abdomen) 120 50 170 30  30    Stool 0  0         Output (ml) (Colostomy 05/16/17) 0  0       Shift Total  (mL/kg) 470  (5.5) 650  (7.6) 1120  (13.1) 1930  (22.6)  1930  (22.6)   NET 1275 896.7 2171.7 -1450  -1450   Weight (kg) 85.3 85.3 85.3 85.3 85.3 85.3     abd soft    A/P dc mcneil cath  Advance diet tomorrow

## 2017-05-17 NOTE — PROGRESS NOTES
Interdisciplinary Rounds were completed on this patient. Rounds included nursing, clinical care leader, pharmacy, and case management. Patient was doing well with some problems : duration of pain medication and frequency of doses not controlling pain. Patient had the following concerns: pain (pharmacy to contact physician). Goals for the day will include: adjust meds , mobilize, address central line removal and mcneil removal with physician. Case management to work on setting up home health for new ostomy care.

## 2017-05-17 NOTE — PROGRESS NOTES
End of Shift Nursing Note    Bedside shift change report given to [address] [place] [address],[address] (oncoming nurse) by Vaibhav Mixon RN (offgoing nurse). Report included the following information SBAR, Kardex, MAR and Recent Results. Significant changes during shift:    none   Non-emergent issues for physician to address:   none     Number times ambulated in hallway past shift: 0      Number of times OOB to chair past shift: 0    POD #: 2     Vital Signs:    Temp: 98 °F (36.7 °C)     Pulse (Heart Rate): 74     BP: 150/74     Resp Rate: 18     O2 Sat (%): 98 %    Lines & Drains:     Urinary Catheter? Yes   Placement Date: 5/15/17   Medical Necessity: yes  Central Line? Yes   Placement Date: 5/15/17   Medical Necessity: yes    NG tube [] in [] removed [] not applicable   Drains [] in [] removed [] not applicable     Skin Integrity:      Wounds: yes   Dressings Present: yes    Wound Concerns: no      GI:    Current diet:  DIET CLEAR LIQUID    Nausea: NO  Vomiting: NO  Bowel Sounds: YES  Flatus: NO  Last Bowel Movement: several days ago    Respiratory:  Supplemental O2: No      Incentive Spirometer: YES   Coughing and Deep Breathing: YES  Oral Care: YES  Understanding (patient/family education): YES   Getting out of bed: YES  Head of bed elevation: YES    Patient Safety:    Falls Score: 1  Mobility Score: 1  Bed Alarm On? No  Sitter? No      Opportunity for questions and clarification was given to oncoming nurse. Patient bed is in low position, side rails are up x 2, door & observation blinds open as needed, call bell within reach and patient not in distress.     Rosa Maria Lo RN

## 2017-05-17 NOTE — PROGRESS NOTES
Pharmacy Medication Reconciliation     The patient was interviewed regarding current PTA medication list, use and drug allergies. The patient was questioned regarding use of any other inhalers, topical products, over the counter medications, herbal medications, vitamin products or ophthalmic/nasal/otic medication use. The patient was awake, alert, oriented and able to discuss her medications and what she is currently taking. Allergy Update:   When asked about lisinopril allergy indicated she gets a dry, hacking cough    Recommendations/Findings: The following amendments were made to the patient's active medication list on file at [facility]:     1) Additions:   Ibuprofen 200 mg tablet: 1 tablet by mouth every 8 hours as needed for pain   Benadryl 25 mg capsule: 1 capsule by mouth every 6 hours as needed for hives    2) Deletions:   Polyethylene glycol (Miralax) 17 gram packet    3) Changes: N/A      -Clarified PTA med list with patient and Rx Query. PTA medication list was corrected to the following:     Prior to Admission Medications   Prescriptions Last Dose Informant Patient Reported? Taking? diphenhydrAMINE (BENADRYL) 25 mg capsule   Yes Yes   Sig: Take 25 mg by mouth every six (6) hours as needed. docusate sodium (COLACE) 100 mg capsule   Yes Yes   Sig: Take 100 mg by mouth as needed for Constipation. ibuprofen (MOTRIN) 200 mg tablet   Yes Yes   Sig: Take 200 mg by mouth every eight (8) hours as needed for Pain.   levoFLOXacin (LEVAQUIN) 750 mg tablet   No Yes   Sig: Take 1 Tab by mouth Daily (before dinner) for 14 days. metroNIDAZOLE (FLAGYL) 500 mg tablet   No Yes   Sig: Take 1 Tab by mouth three (3) times daily for 14 days. nicotine (NICODERM CQ) 14 mg/24 hr patch   No Yes   Si Patch by TransDERmal route every twenty-four (24) hours for 30 days. ondansetron (ZOFRAN ODT) 4 mg disintegrating tablet   No Yes   Sig: Take 1 Tab by mouth every eight (8) hours as needed for Nausea. oxyCODONE-acetaminophen (PERCOCET) 5-325 mg per tablet   No No   Sig: Take 1-2 Tabs by mouth every six (6) hours as needed. Max Daily Amount: 8 Tabs. For pain   polyethylene glycol (MIRALAX) 17 gram packet   No No   Sig: Take 1 Packet by mouth daily as needed. For constipation   valsartan (DIOVAN) 160 mg tablet   No Yes   Sig: Take 1 Tab by mouth daily for 30 days.       Facility-Administered Medications: None          Thank you,  [id number] [facility] PharmD Candidate [id number]

## 2017-05-17 NOTE — PROGRESS NOTES
Pt is a 45 y/o  female admitted for perforated diverticulum. Pt lives with her two sons in a two story home with primary access on the main floor. Pt is independent with ADL's to include driving. Pt has no previous HH or SNF providers. Pt has access to crutches as needed. Pt prefers [employer] on [facility]. Pt will be transported at discharge by spouse, Mr. Ruby Vazquez. CM met with pt to complete initial assessment. Pt is alert and oriented to person, place, time and situation. CM will continue to follow and assist as needed. Care Management Interventions  PCP Verified by CM: No (None )  Mode of Transport at Discharge:  Other (see comment) (private vehicle )  Transition of Care Consult (CM Consult): Discharge Planning (CM to assist as needed )  Discharge Durable Medical Equipment: No (pt has access to crutches )  Health Maintenance Reviewed: Yes  Physical Therapy Consult: No  Occupational Therapy Consult: No  Speech Therapy Consult: No  Current Support Network: Own Home, Other (pt's two sons reside with her )  Confirm Follow Up Transport: Self  Plan discussed with Pt/Family/Caregiver: Yes  Discharge Location  Discharge Placement: Home    JOSY Abdul, [address] [facility]   [phone]

## 2017-05-18 LAB
ANION GAP BLD CALC-SCNC: 9 MMOL/L (ref 5–15)
BACTERIA SPEC CULT: ABNORMAL
BUN SERPL-MCNC: 6 MG/DL (ref 6–20)
BUN/CREAT SERPL: 16 (ref 12–20)
CALCIUM SERPL-MCNC: 8 MG/DL (ref 8.5–10.1)
CHLORIDE SERPL-SCNC: 104 MMOL/L (ref 97–108)
CO2 SERPL-SCNC: 27 MMOL/L (ref 21–32)
CREAT SERPL-MCNC: 0.37 MG/DL (ref 0.55–1.02)
GLUCOSE SERPL-MCNC: 93 MG/DL (ref 65–100)
POTASSIUM SERPL-SCNC: 3.7 MMOL/L (ref 3.5–5.1)
SERVICE CMNT-IMP: ABNORMAL
SODIUM SERPL-SCNC: 140 MMOL/L (ref 136–145)

## 2017-05-18 PROCEDURE — 74011250637 HC RX REV CODE- 250/637: Performed by: INTERNAL MEDICINE

## 2017-05-18 PROCEDURE — 74011000258 HC RX REV CODE- 258: Performed by: INTERNAL MEDICINE

## 2017-05-18 PROCEDURE — 80048 BASIC METABOLIC PNL TOTAL CA: CPT | Performed by: INTERNAL MEDICINE

## 2017-05-18 PROCEDURE — 74011250637 HC RX REV CODE- 250/637: Performed by: COLON & RECTAL SURGERY

## 2017-05-18 PROCEDURE — 36415 COLL VENOUS BLD VENIPUNCTURE: CPT | Performed by: INTERNAL MEDICINE

## 2017-05-18 PROCEDURE — 74011250636 HC RX REV CODE- 250/636: Performed by: COLON & RECTAL SURGERY

## 2017-05-18 PROCEDURE — 36591 DRAW BLOOD OFF VENOUS DEVICE: CPT

## 2017-05-18 PROCEDURE — 74011250636 HC RX REV CODE- 250/636: Performed by: INTERNAL MEDICINE

## 2017-05-18 PROCEDURE — 65270000029 HC RM PRIVATE

## 2017-05-18 RX ORDER — ACETAMINOPHEN 325 MG/1
650 TABLET ORAL EVERY 6 HOURS
Status: DISCONTINUED | OUTPATIENT
Start: 2017-05-18 | End: 2017-05-24 | Stop reason: HOSPADM

## 2017-05-18 RX ADMIN — Medication 10 ML: at 21:11

## 2017-05-18 RX ADMIN — ONDANSETRON HYDROCHLORIDE 4 MG: 2 INJECTION, SOLUTION INTRAMUSCULAR; INTRAVENOUS at 05:50

## 2017-05-18 RX ADMIN — ONDANSETRON HYDROCHLORIDE 4 MG: 2 INJECTION, SOLUTION INTRAMUSCULAR; INTRAVENOUS at 01:54

## 2017-05-18 RX ADMIN — HYDROMORPHONE HYDROCHLORIDE 1 MG: 2 INJECTION, SOLUTION INTRAMUSCULAR; INTRAVENOUS; SUBCUTANEOUS at 10:59

## 2017-05-18 RX ADMIN — ENOXAPARIN SODIUM 40 MG: 40 INJECTION SUBCUTANEOUS at 08:16

## 2017-05-18 RX ADMIN — ACETAMINOPHEN 650 MG: 325 TABLET, FILM COATED ORAL at 23:45

## 2017-05-18 RX ADMIN — HYDROMORPHONE HYDROCHLORIDE 1 MG: 2 INJECTION, SOLUTION INTRAMUSCULAR; INTRAVENOUS; SUBCUTANEOUS at 21:10

## 2017-05-18 RX ADMIN — CEFEPIME HYDROCHLORIDE 2 G: 2 INJECTION, POWDER, FOR SOLUTION INTRAVENOUS at 05:50

## 2017-05-18 RX ADMIN — VALSARTAN 160 MG: 160 TABLET ORAL at 08:16

## 2017-05-18 RX ADMIN — HYDROMORPHONE HYDROCHLORIDE 1 MG: 2 INJECTION, SOLUTION INTRAMUSCULAR; INTRAVENOUS; SUBCUTANEOUS at 23:46

## 2017-05-18 RX ADMIN — HYDROMORPHONE HYDROCHLORIDE 1 MG: 2 INJECTION, SOLUTION INTRAMUSCULAR; INTRAVENOUS; SUBCUTANEOUS at 15:55

## 2017-05-18 RX ADMIN — DEXTROSE MONOHYDRATE, SODIUM CHLORIDE, AND POTASSIUM CHLORIDE: 50; 9; 2.98 INJECTION, SOLUTION INTRAVENOUS at 22:09

## 2017-05-18 RX ADMIN — SODIUM CHLORIDE 3 G: 900 INJECTION INTRAVENOUS at 12:20

## 2017-05-18 RX ADMIN — SODIUM CHLORIDE 3 G: 900 INJECTION INTRAVENOUS at 17:41

## 2017-05-18 RX ADMIN — SODIUM CHLORIDE 3 G: 900 INJECTION INTRAVENOUS at 23:45

## 2017-05-18 RX ADMIN — ONDANSETRON HYDROCHLORIDE 4 MG: 2 INJECTION, SOLUTION INTRAMUSCULAR; INTRAVENOUS at 10:59

## 2017-05-18 RX ADMIN — ACETAMINOPHEN 650 MG: 325 TABLET, FILM COATED ORAL at 12:20

## 2017-05-18 RX ADMIN — HYDROMORPHONE HYDROCHLORIDE 1 MG: 2 INJECTION, SOLUTION INTRAMUSCULAR; INTRAVENOUS; SUBCUTANEOUS at 13:31

## 2017-05-18 RX ADMIN — HYDROMORPHONE HYDROCHLORIDE 1 MG: 2 INJECTION, SOLUTION INTRAMUSCULAR; INTRAVENOUS; SUBCUTANEOUS at 17:44

## 2017-05-18 RX ADMIN — HYDROMORPHONE HYDROCHLORIDE 1 MG: 2 INJECTION, SOLUTION INTRAMUSCULAR; INTRAVENOUS; SUBCUTANEOUS at 08:15

## 2017-05-18 RX ADMIN — ACETAMINOPHEN 650 MG: 325 TABLET, FILM COATED ORAL at 17:44

## 2017-05-18 RX ADMIN — Medication 10 ML: at 05:50

## 2017-05-18 RX ADMIN — HYDROMORPHONE HYDROCHLORIDE 1 MG: 2 INJECTION, SOLUTION INTRAMUSCULAR; INTRAVENOUS; SUBCUTANEOUS at 01:54

## 2017-05-18 RX ADMIN — Medication 10 ML: at 17:47

## 2017-05-18 RX ADMIN — HYDROMORPHONE HYDROCHLORIDE 1 MG: 2 INJECTION, SOLUTION INTRAMUSCULAR; INTRAVENOUS; SUBCUTANEOUS at 05:50

## 2017-05-18 RX ADMIN — HYDROMORPHONE HYDROCHLORIDE 1 MG: 2 INJECTION, SOLUTION INTRAMUSCULAR; INTRAVENOUS; SUBCUTANEOUS at 03:56

## 2017-05-18 RX ADMIN — METRONIDAZOLE 500 MG: 250 TABLET ORAL at 05:49

## 2017-05-18 NOTE — PROGRESS NOTES
0900: Interdisciplinary Rounds were completed on this patient. Rounds included nursing, clinical care leader, pharmacy, and case management. Patient was doing well without problems. Patient had the following concerns: pain. Goals for the day will include: adjust meds  and mobilize. Pharmacy to follow up with physician about adding scheduled tylenol to help manage patient's pain. Patient requiring IV dilaudid 1mg every 2 hours consistently. 1030: Followed up with patient after rounds to address concerns. Patient very tearful, she feels that in rounds it was suggested that she was becoming addicted to pain medication. Educated patient that the pain medication is still available and the team was not suggesting that she was addicted to the pain medication. Pharmacy to verify with MD about SUPPLEMENTAL medication (tylenol) to see if that helps control her pain so that she wouldn't have to take the dilaudid as frequently. Patient verbalized understanding, family member at bedside as well. Patient also shared that she has experienced addiction within her family in the past and is aware of the addictive qualities of pain medication.

## 2017-05-18 NOTE — PROGRESS NOTES
End of Shift Nursing Note    Bedside shift change report given to Gissel James RN (oncoming nurse) by Patti Bermudez RN (offgoing nurse). Report included the following information SBAR, MAR and Recent Results. Zone Phone:   [phone]    Significant changes during shift:    None   Non-emergent issues for physician to address:   None     Number times ambulated in hallway past shift: 1      Number of times OOB to chair past shift: 1    POD #: 3     Vital Signs:    Temp: 98 °F (36.7 °C)     Pulse (Heart Rate): 79     BP: 133/86     Resp Rate: 17     O2 Sat (%): 92 %    Lines & Drains:     Urinary Catheter? No  Central Line? Yes   Placement Date: 5/15   Medical Necessity: yes  PICC Line? No      NG tube [] in [] removed [x] not applicable   Drains [x] in [] removed [] not applicable     Skin Integrity:      Wounds: no   Dressings Present: no    Wound Concerns: no      GI:    Current diet:  DIET CLEAR LIQUID    Nausea: NO  Vomiting: NO  Bowel Sounds: YES  Flatus: YES  Last Bowel Movement: several days ago; ostomy    Respiratory:  Supplemental O2: No        Incentive Spirometer: YES    Coughing and Deep Breathing: YES  Oral Care: YES  Understanding (patient/family education): YES   Getting out of bed: YES  Head of bed elevation: YES    Patient Safety:    Falls Score: 2  Mobility Score: 1  Bed Alarm On? No  Sitter? No      Opportunity for questions and clarification was given to oncoming nurse. Patient bed is in low position, side rails are up x 3, door & observation blinds open as needed, call bell within reach and patient not in distress.     Shilpa Soria RN

## 2017-05-18 NOTE — PROGRESS NOTES
End of Shift Nursing Note    Bedside shift change report given to Clement (oncoming nurse) by Chanel Rogers (offgoing nurse). Report included the following information SBAR, Kardex, OR Summary, Procedure Summary, Intake/Output, MAR, Accordion, Recent Results and Med Rec Status. Zone Phone:   [phone]    Significant changes during shift:    Pt up walking; good output; needing pain medication every 2 hours   Non-emergent issues for physician to address:        Number times ambulated in hallway past shift: 3      Number of times OOB to chair past shift: 2    POD #:      Vital Signs:    Temp: 98 °F (36.7 °C)     Pulse (Heart Rate): 79     BP: 133/86     Resp Rate: 17     O2 Sat (%): 92 %    Lines & Drains:     Urinary Catheter? No   Placement Date:    Medical Necessity:   Central Line? Yes   Placement Date:    Medical Necessity: surgery  PICC Line? No   Placement Date:    Medical Necessity:     NG tube [] in [] removed [x] not applicable   Drains [x] in [] removed [] not applicable     Skin Integrity:      Wounds: yes   Dressings Present: yes    Wound Concerns: no      GI:    Current diet:  DIET CLEAR LIQUID    Nausea: YES  Vomiting: no  Bowel Sounds: YES  Flatus: YES  Last Bowel Movement: today   Appearance: brown liquid ostomy      Respiratory:  Supplemental O2: No      Device:    via  Liters/min     Incentive Spirometer: YES  Volume:   Coughing and Deep Breathing: YES  Oral Care: YES  Understanding (patient/family education): YES   Getting out of bed: YES  Head of bed elevation: YES    Patient Safety:    Falls Score: 2  Mobility Score: 2  Bed Alarm On? No  Sitter? No      Opportunity for questions and clarification was given to oncoming nurse. Patient bed is in low position, side rails are up x 2, door & observation blinds open as needed, call bell within reach and patient not in distress.     Yaz Carvajal

## 2017-05-18 NOTE — PROGRESS NOTES
Hospitalist Progress Note    NAME: Adriane Holly   :  1965   MRN:  [id number]       Interim Hospital Summary: 46 y.o. female whom presented on 2017 with      Assessment / Plan:    Acute Diverticulitis with perforation POA  -CT abdomen  Severe sigmoid colon diverticulitis now has perforated, and there are small fluid collections adjacent to the affected loops of sigmoid colon. Moderate pelvic ascites has worsened mildly. There is no identifiable colovesical fistula. -S/P sigmoid colectomy with end colostomy 5/15/17  -Leukocytosis resolved. Cultures with light lactobacillus, anaerobes pending. Switch to Unasyn and transition to augmentin at discharge  -on clears. Diet per CRS  -mcneil out  -IV dilaudid prn. Percocet prn    No UTI    Essential HTN POA   -continue Diovan use labetalol prn. Tobacco use POA    -Nicotine patch    Morbid obesity POA      Next of Kin:   Body mass index is 34.39 kg/(m^2). Code status: Full  Prophylaxis: SCD's  Recommended Disposition:  PT, OT, RN     Subjective:     Chief Complaint / Reason for Physician Visit  Follow up of HTN, perforated diverticulitis   Using IV dilaudid prn. Review of Systems:  Symptom Y/N Comments  Symptom Y/N Comments   Fever/Chills    Chest Pain     Poor Appetite    Edema     Cough    Abdominal Pain y    Sputum    Joint Pain     SOB/MORAES    Pruritis/Rash     Nausea/vomit    Tolerating PT/OT     Diarrhea    Tolerating Diet     Constipation    Other       Could NOT obtain due to:      Objective:     VITALS:   Last 24hrs VS reviewed since prior progress note.  Most recent are:  Patient Vitals for the past 24 hrs:   Temp Pulse Resp BP SpO2   17 0820 98 °F (36.7 °C) 79 17 133/86 92 %   17 0202 98.6 °F (37 °C) 85 18 157/86 94 %   17 2351 98.8 °F (37.1 °C) 88 18 150/81 92 %   17 2021 98.2 °F (36.8 °C) 86 22 145/79 92 %   17 1626 98.8 °F (37.1 °C) 90 16 (!) 147/92 96 %   17 1454 97.4 °F (36.3 °C) 64 18 138/64 98 %   05/17/17 1415 97.8 °F (36.6 °C) - 18 162/90 97 %       Intake/Output Summary (Last 24 hours) at 05/18/17 1127  Last data filed at 05/18/17 0931   Gross per 24 hour   Intake          2933.33 ml   Output             2995 ml   Net           -61.67 ml        PHYSICAL EXAM:  General: WD, WN. Alert, cooperative, no acute distress    EENT:  EOMI. Anicteric sclerae. MMM  Resp:  CTA bilaterally, no wheezing or rales. No accessory muscle use  CV:  Regular  rhythm,  No edema  GI:  Soft, Non distended, + mildly tender  +Bowel sounds  Neurologic:  Alert and oriented X 3, normal speech,   Psych:   Good insight. Not anxious nor agitated  Skin:  No rashes. No jaundice    Reviewed most current lab test results and cultures  YES  Reviewed most current radiology test results   YES  Review and summation of old records today    NO  Reviewed patient's current orders and MAR    YES  PMH/SH reviewed - no change compared to H&P  ________________________________________________________________________  Care Plan discussed with:    Comments   Patient y    Family      RN y    Care Manager     Consultant                        Multidiciplinary team rounds were held today with , nursing, pharmacist and clinical coordinator. Patient's plan of care was discussed; medications were reviewed and discharge planning was addressed. ________________________________________________________________________  Total NON critical care TIME: 25   Minutes    Total CRITICAL CARE TIME Spent:   Minutes non procedure based      Comments   >50% of visit spent in counseling and coordination of care     ________________________________________________________________________  Erica Reyna MD     Procedures: see electronic medical records for all procedures/Xrays and details which were not copied into this note but were reviewed prior to creation of Plan.       LABS:  I reviewed today's most current labs and imaging studies.   Pertinent labs include:  Recent Labs      05/16/17   0530  05/15/17   1926   WBC  10.0  16.2*   HGB  11.4*  11.5   HCT  35.1  35.3   PLT  461*  444*     Recent Labs      05/18/17   0157  05/17/17   0500  05/16/17   0530   NA  140  143  141   K  3.7  3.9  4.1   CL  104  109*  106   CO2  27  27  30   GLU  93  112*  130*   BUN  6  10  6   CREA  0.37*  0.38*  0.38*   CA  8.0*  7.6*  8.1*   MG   --    --   2.4   ALB   --    --   2.3*   TBILI   --    --   0.3   SGOT   --    --   8*   ALT   --    --   9*       Signed: Kita Park MD

## 2017-05-19 LAB
ERYTHROCYTE [DISTWIDTH] IN BLOOD BY AUTOMATED COUNT: 14.3 % (ref 11.5–14.5)
HCT VFR BLD AUTO: 33.3 % (ref 35–47)
HGB BLD-MCNC: 10.8 G/DL (ref 11.5–16)
MCH RBC QN AUTO: 29 PG (ref 26–34)
MCHC RBC AUTO-ENTMCNC: 32.4 G/DL (ref 30–36.5)
MCV RBC AUTO: 89.3 FL (ref 80–99)
PLATELET # BLD AUTO: 552 K/UL (ref 150–400)
RBC # BLD AUTO: 3.73 M/UL (ref 3.8–5.2)
WBC # BLD AUTO: 16.9 K/UL (ref 3.6–11)

## 2017-05-19 PROCEDURE — 74011000258 HC RX REV CODE- 258: Performed by: INTERNAL MEDICINE

## 2017-05-19 PROCEDURE — 36415 COLL VENOUS BLD VENIPUNCTURE: CPT | Performed by: COLON & RECTAL SURGERY

## 2017-05-19 PROCEDURE — 74011250636 HC RX REV CODE- 250/636: Performed by: INTERNAL MEDICINE

## 2017-05-19 PROCEDURE — 77030010520

## 2017-05-19 PROCEDURE — 74011250636 HC RX REV CODE- 250/636: Performed by: COLON & RECTAL SURGERY

## 2017-05-19 PROCEDURE — 65270000029 HC RM PRIVATE

## 2017-05-19 PROCEDURE — 77030010541

## 2017-05-19 PROCEDURE — 74011250637 HC RX REV CODE- 250/637: Performed by: INTERNAL MEDICINE

## 2017-05-19 PROCEDURE — 85027 COMPLETE CBC AUTOMATED: CPT | Performed by: COLON & RECTAL SURGERY

## 2017-05-19 RX ORDER — OXYCODONE HYDROCHLORIDE 5 MG/1
10 TABLET ORAL
Status: DISCONTINUED | OUTPATIENT
Start: 2017-05-19 | End: 2017-05-22

## 2017-05-19 RX ORDER — IBUPROFEN 200 MG
1 TABLET ORAL DAILY
Status: DISCONTINUED | OUTPATIENT
Start: 2017-05-19 | End: 2017-05-24 | Stop reason: HOSPADM

## 2017-05-19 RX ADMIN — OXYCODONE HYDROCHLORIDE 10 MG: 5 TABLET ORAL at 14:37

## 2017-05-19 RX ADMIN — DEXTROSE MONOHYDRATE, SODIUM CHLORIDE, AND POTASSIUM CHLORIDE: 50; 9; 2.98 INJECTION, SOLUTION INTRAVENOUS at 14:08

## 2017-05-19 RX ADMIN — HYDROMORPHONE HYDROCHLORIDE 1 MG: 2 INJECTION, SOLUTION INTRAMUSCULAR; INTRAVENOUS; SUBCUTANEOUS at 02:41

## 2017-05-19 RX ADMIN — Medication 10 ML: at 14:11

## 2017-05-19 RX ADMIN — Medication 10 ML: at 05:40

## 2017-05-19 RX ADMIN — HYDROMORPHONE HYDROCHLORIDE 1 MG: 2 INJECTION, SOLUTION INTRAMUSCULAR; INTRAVENOUS; SUBCUTANEOUS at 08:12

## 2017-05-19 RX ADMIN — ENOXAPARIN SODIUM 40 MG: 40 INJECTION SUBCUTANEOUS at 08:18

## 2017-05-19 RX ADMIN — ACETAMINOPHEN 650 MG: 325 TABLET, FILM COATED ORAL at 17:09

## 2017-05-19 RX ADMIN — OXYCODONE HYDROCHLORIDE 10 MG: 5 TABLET ORAL at 10:41

## 2017-05-19 RX ADMIN — HYDROMORPHONE HYDROCHLORIDE 1 MG: 2 INJECTION, SOLUTION INTRAMUSCULAR; INTRAVENOUS; SUBCUTANEOUS at 23:38

## 2017-05-19 RX ADMIN — SODIUM CHLORIDE 3 G: 900 INJECTION INTRAVENOUS at 05:36

## 2017-05-19 RX ADMIN — SODIUM CHLORIDE 3 G: 900 INJECTION INTRAVENOUS at 14:11

## 2017-05-19 RX ADMIN — VALSARTAN 160 MG: 160 TABLET ORAL at 08:19

## 2017-05-19 RX ADMIN — SODIUM CHLORIDE 3 G: 900 INJECTION INTRAVENOUS at 19:23

## 2017-05-19 RX ADMIN — HYDROMORPHONE HYDROCHLORIDE 1 MG: 2 INJECTION, SOLUTION INTRAMUSCULAR; INTRAVENOUS; SUBCUTANEOUS at 16:55

## 2017-05-19 RX ADMIN — ACETAMINOPHEN 650 MG: 325 TABLET, FILM COATED ORAL at 12:06

## 2017-05-19 RX ADMIN — HYDROMORPHONE HYDROCHLORIDE 1 MG: 2 INJECTION, SOLUTION INTRAMUSCULAR; INTRAVENOUS; SUBCUTANEOUS at 21:09

## 2017-05-19 RX ADMIN — OXYCODONE HYDROCHLORIDE 10 MG: 5 TABLET ORAL at 19:23

## 2017-05-19 RX ADMIN — ACETAMINOPHEN 650 MG: 325 TABLET, FILM COATED ORAL at 23:38

## 2017-05-19 RX ADMIN — HYDROMORPHONE HYDROCHLORIDE 1 MG: 2 INJECTION, SOLUTION INTRAMUSCULAR; INTRAVENOUS; SUBCUTANEOUS at 05:34

## 2017-05-19 RX ADMIN — Medication 10 ML: at 21:10

## 2017-05-19 RX ADMIN — HYDROMORPHONE HYDROCHLORIDE 1 MG: 2 INJECTION, SOLUTION INTRAMUSCULAR; INTRAVENOUS; SUBCUTANEOUS at 12:06

## 2017-05-19 RX ADMIN — Medication 10 ML: at 05:41

## 2017-05-19 RX ADMIN — ACETAMINOPHEN 650 MG: 325 TABLET, FILM COATED ORAL at 05:36

## 2017-05-19 NOTE — PROGRESS NOTES
+stool in bag  Visit Vitals    /84 (BP 1 Location: Left arm, BP Patient Position: At rest)    Pulse 81    Temp 98.5 °F (36.9 °C)    Resp 20    Ht 5' 2\" (1.575 m)    Wt 85.3 kg (188 lb)    SpO2 96%    BMI 34.39 kg/m2       Date 05/17/17 1900 - 05/18/17 0659 05/18/17 0700 - 05/19/17 0659   Shift 1040-8743 24 Hour Total 9858-3219 3062-2878 24 Hour Total   I  N  T  A  K  E   P.O.   1095      P.O.   1095    I.V.  (mL/kg/hr) 2213.3 2213.3         Volume (dextrose 5% - 0.9% NaCl with KCl 40 mEq/L infusion) 1813.3 1813.3         Volume (cefepime (MAXIPIME) 2 g in 0.9% sodium chloride (MBP/ADV) 100 mL MBP) 400 400       Shift Total  (mL/kg) 2453.3  (28.8) 3173.3  (37.2) 1095  (12.8)  1095  (12.8)   O  U  T  P  U  T   Urine  (mL/kg/hr) 600 2700 1605  (1.6)  1605      Urine Voided 600 2300 1605  1605      Urine Occurrence(s)   1 x  1 x      Urine Output (mL) ([REMOVED] Urinary Catheter 05/15/17 Simpson)  400       Drains 20 50 25  25      Output (ml) (Elvin Drain #1 05/15/17 Anterior Abdomen) 20 50 25  25    Stool  0 25  25      Output (ml) (Colostomy 05/16/17)  0 25  25    Shift Total  (mL/kg) 620  (7.3) 2750  (32.2) 1655  (19.4)  1655  (19.4)   NET 1833.3 423.3 -560  -560   Weight (kg) 85.3 85.3 85.3 85.3 85.3     abd soft  Stoma with stool in bag    A/P advance diet slowly.    Doing well   needs more stoma teaching

## 2017-05-19 NOTE — PROGRESS NOTES
Nutrition Services      Nutrition Screen:  Wt Readings from Last 10 Encounters:   05/14/17 85.3 kg (188 lb)   05/01/17 85.3 kg (188 lb 0.8 oz)   11/15/16 90.6 kg (199 lb 11.8 oz)   07/25/16 89.1 kg (196 lb 6.9 oz)   03/13/16 89.9 kg (198 lb 3.1 oz)   09/02/14 69 kg (152 lb 3.2 oz)   08/20/11 83.9 kg (185 lb)     Body mass index is 34.39 kg/(m^2). Diet info provided to pt /colostomy ; pt expressed understanding. Written ed & contact info provided. Supplements:                        _____ ordered ______  declined. __ __  Pt is nutritionally stable at this time, will rescreen in 7 days. __x__    Pt is at nutritional risk and will be rescreened in 2-4 days. __ __  Pt is at moderate or high nutritional risk, will refer to RD for assessment.        Niko Harman  Dietetic Technician, Registered

## 2017-05-19 NOTE — PROGRESS NOTES
Hospitalist Progress Note    NAME: Iftikhar Ocampo   :  1965   MRN:  [id number]       Interim Hospital Summary: 46 y.o. female whom presented on 2017 with      Assessment / Plan:    Acute Diverticulitis with perforation POA  -CT abdomen  Severe sigmoid colon diverticulitis now has perforated, and there are small fluid collections adjacent to the affected loops of sigmoid colon. Moderate pelvic ascites has worsened mildly. There is no identifiable colovesical fistula. -S/P sigmoid colectomy with end colostomy 5/15/17  -Leukocytosis resolved. Cultures with light lactobacillus, anaerobes pending. Switch to Unasyn and transition to augmentin at discharge  -on full liquids. -offered oxycodone 10mg q4prn to use. IV dilaudid if not enough. Essential HTN POA   -continue Diovan use labetalol prn. Tobacco use POA    -Nicotine patch    Morbid obesity POA      Next of Kin:   Body mass index is 34.39 kg/(m^2). Code status: Full  Prophylaxis: SCD's  Recommended Disposition:  PT, OT, RN     Subjective:     Chief Complaint / Reason for Physician Visit  Follow up of HTN, perforated diverticulitis   Using IV dilaudid pretty much every 2 hours. Tolerating full liquids    Review of Systems:  Symptom Y/N Comments  Symptom Y/N Comments   Fever/Chills    Chest Pain     Poor Appetite    Edema     Cough    Abdominal Pain y    Sputum    Joint Pain     SOB/MORAES    Pruritis/Rash     Nausea/vomit    Tolerating PT/OT     Diarrhea    Tolerating Diet     Constipation    Other       Could NOT obtain due to:      Objective:     VITALS:   Last 24hrs VS reviewed since prior progress note.  Most recent are:  Patient Vitals for the past 24 hrs:   Temp Pulse Resp BP SpO2   17 1209 98 °F (36.7 °C) 77 18 126/81 96 %   17 0810 98.1 °F (36.7 °C) 76 18 133/80 96 %   17 2322 98.4 °F (36.9 °C) 81 18 132/84 97 %   17 2000 98.5 °F (36.9 °C) 81 20 136/84 96 %   17 1913 98.1 °F (36.7 °C) 81 16 129/73 97 %       Intake/Output Summary (Last 24 hours) at 05/19/17 1451  Last data filed at 05/19/17 1427   Gross per 24 hour   Intake          2555.83 ml   Output              615 ml   Net          1940.83 ml        PHYSICAL EXAM:  General: WD, WN. Alert, cooperative, no acute distress    EENT:  EOMI. Anicteric sclerae. MMM  Resp:  CTA bilaterally, no wheezing or rales. No accessory muscle use  CV:  Regular  rhythm,  No edema  GI:  Soft, Non distended, + mildly tender  +Bowel sounds  Neurologic:  Alert and oriented X 3, normal speech,   Psych:   Good insight. Not anxious nor agitated  Skin:  No rashes. No jaundice    Reviewed most current lab test results and cultures  YES  Reviewed most current radiology test results   YES  Review and summation of old records today    NO  Reviewed patient's current orders and MAR    YES  PMH/SH reviewed - no change compared to H&P  ________________________________________________________________________  Care Plan discussed with:    Comments   Patient y    Family      RN y    Care Manager     Consultant                        Multidiciplinary team rounds were held today with , nursing, pharmacist and clinical coordinator. Patient's plan of care was discussed; medications were reviewed and discharge planning was addressed. ________________________________________________________________________  Total NON critical care TIME: 25   Minutes    Total CRITICAL CARE TIME Spent:   Minutes non procedure based      Comments   >50% of visit spent in counseling and coordination of care     ________________________________________________________________________  Stevenson Roberto MD     Procedures: see electronic medical records for all procedures/Xrays and details which were not copied into this note but were reviewed prior to creation of Plan. LABS:  I reviewed today's most current labs and imaging studies.   Pertinent labs include:  Recent Labs      05/19/17 0238   WBC  16.9*   HGB  10.8*   HCT  33.3*   PLT  552*     Recent Labs      05/18/17   0157  05/17/17   0500   NA  140  143   K  3.7  3.9   CL  104  109*   CO2  27  27   GLU  93  112*   BUN  6  10   CREA  0.37*  0.38*   CA  8.0*  7.6*       Signed: Gogo Marquez MD

## 2017-05-19 NOTE — PROGRESS NOTES
End of Shift Nursing Note    Bedside shift change report given to Jone Bustamante RN (oncoming nurse) by Medina Bui RN (offgoing nurse). Report included the following information SBAR, Kardex and Intake/Output. Zone Phone:       Significant changes during shift:    none   Non-emergent issues for physician to address:   none     Number times ambulated in hallway past shift: 1      Number of times OOB to chair past shift: 1    POD #:      Vital Signs:    Temp: 98 °F (36.7 °C)     Pulse (Heart Rate): 77     BP: 126/81     Resp Rate: 18     O2 Sat (%): 96 %    Lines & Drains:     Urinary Catheter? No   Placement Date:    Medical Necessity:   Central Line? Yes   Placement Date: 5/15   Medical Necessity: yes  PICC Line? No   Placement Date:    Medical Necessity:     NG tube [] in [] removed [x] not applicable   Drains [x] in [] removed [] not applicable     Skin Integrity:      Wounds: yes   Dressings Present: yes    Wound Concerns: no      GI:    Current diet:  DIET FULL LIQUID    Nausea: NO  Vomiting: NO  Bowel Sounds: YES  Flatus: YES  Last Bowel Movement: several days ago   Appearance:     Respiratory:  Supplemental O2: No      Device:    via  Liters/min     Incentive Spirometer: YES  Volume:   Coughing and Deep Breathing: YES  Oral Care: YES  Understanding (patient/family education): YES   Getting out of bed: YES  Head of bed elevation: YES    Patient Safety:    Falls Score: 1  Mobility Score: 1  Bed Alarm On? No  Sitter? No      Opportunity for questions and clarification was given to oncoming nurse. Patient bed is in low position, side rails are up x 2, door & observation blinds open as needed, call bell within reach and patient not in distress.     Ancelmo Guerrero

## 2017-05-19 NOTE — PROGRESS NOTES
Pharmacy Automatic Renal Dosing Protocol - Antimicrobials    Indication for Antimicrobials: Perforated Diverticulum/abscess POA, s/p colectomy & colostomy  5/15    Current Regimen of Each Antimicrobial (Start Day & Day of Therapy): Unasyn 3gm IV q6h; start ; Day #2    Previous Abx  Cefepime 2 gm IV every 8hrs - SD 5/15/17 -  Metronidazole 500mg PO every 8hrs - SD 17 -      Significant Cultures:  5/15: Wound: Light Lactobacillus sp (FINAL)   5/15: Wound-Anaerobic: Light Anaerobic Diphtheroids (FINAL)  17 - Urine - MIXED UROGENITAL FELIPE ISOLATED - Final    CT - Severe sigmoid colon diverticulitis now has perforated     CAPD, Hemodialysis or Renal Replacement Therapy: None  Paralysis, amputations, malnutrition: None    Recent Labs      17   0238  17   0157  17   0500   CREA   --   0.37*  0.38*   BUN   --   6  10   WBC  16.9*   --    --      Temp (24hrs), Av.3 °F (36.8 °C), Min:98 °F (36.7 °C), Max:98.5 °F (36.9 °C)    Creatinine Clearance (Creatinine Clearance (ml/min)): > 100    Impression/Plan:   Continue Unasyn 3gm IV q6h which is dosed appropriately based on indication and renal function       Pharmacy will follow daily and adjust medications as appropriate for renal function and/or serum levels.     Thank you,  Betty Preciado, [facility]     Renal Dosing Tables on [employer]

## 2017-05-19 NOTE — PROGRESS NOTES
Interdisciplinary Rounds were completed on this patient. Rounds included nursing, clinical care leader, pharmacy, and case management. Patient was doing well without problems. Patient had the following concerns: did not like diet for breakfast (nurse to follow up with a menu). Goals for the day will include: mobilize.

## 2017-05-19 NOTE — PROGRESS NOTES
End of Shift Nursing Note    Bedside shift change report given to August Greene (oncoming nurse) by Malaika Urban RN (offgoing nurse). Report included the following information SBAR. Zone Phone:       Significant changes during shift:    none   Non-emergent issues for physician to address:   none     Number times ambulated in hallway past shift: 0      Number of times OOB to chair past shift: 0    POD #:      Vital Signs:    Temp: 98.4 °F (36.9 °C)     Pulse (Heart Rate): 81     BP: 132/84     Resp Rate: 18     O2 Sat (%): 97 %    Lines & Drains:     Urinary Catheter? No   Central Line? Yes   Placement Date: 5/15   Medical Necessity: yes      NG tube [] in [] removed [x] not applicable   Drains [x] in [] removed [] not applicable     Skin Integrity:      Wounds: yes   Dressings Present: yes    Wound Concerns: no      GI:    Current diet:  DIET FULL LIQUID    Nausea: NO  Vomiting: NO  Bowel Sounds: YES  Flatus: YES  Last Bowel Movement: several days ago   Appearance:     Respiratory:  Supplemental O2: No        Incentive Spirometer: YES  Volume:   Coughing and Deep Breathing: YES  Oral Care: YES  Understanding (patient/family education): YES   Getting out of bed: YES  Head of bed elevation: YES    Patient Safety:    Falls Score: 1  Mobility Score: 4  Bed Alarm On? No  Sitter? No      Opportunity for questions and clarification was given to oncoming nurse. Patient bed is in low position, side rails are up x 2, door & observation blinds open as needed, call bell within reach and patient not in distress.     Jorge Lagunas

## 2017-05-19 NOTE — WOUND CARE
Ostomy care: POD#4 end colostomy. Patient appears better today, positive stool and positive flatus. Still moving very slow and feeling weak. Patient getting good pain control now with PO pain medicine with IV to supplement. On 5/18 ostomy nurse meet with patient and her  to review ostomy care for the first time:  Ostomy Nurse reviewed normal and revised anatomy; reviewed educational material including: when and how to empty and change ostomy appliance; normal characteristics of stoma, peristomal skin, and drainage output; changes in diet and importance of hydration; peristomal skin care including crusting; signs and symptoms of diarrhea and constipation; measures to prevent complications above; patient receptive asking appropriate questions; written educational materials given to patient for home reference. Today we did a bedside pouch change with pt's  present. Patient is obese and very anxious about caring for her stoma. Patient cannot see her stoma secondary to it's placement and her obesity, but probably would not look at it if she could. Her  is accepting of this and willing to take on helping patient change the pouch. Patient will have to know how to empty her pouch on her own. Stoma: pink, moist, flat and oval. Patient using one piece pouch with stoma ring for soft convexity, but she will need a convex pouch upon discharge. Plan: patient needs encouragement for self care of pouch. Ostomy nurse to still review supplies and supply ordering before discharge!   Isaias Lynn RN, CWON, zone ph# [phone]

## 2017-05-20 LAB — BUN SERPL-MCNC: 5 MG/DL (ref 6–20)

## 2017-05-20 PROCEDURE — 74011000258 HC RX REV CODE- 258: Performed by: INTERNAL MEDICINE

## 2017-05-20 PROCEDURE — 74011250636 HC RX REV CODE- 250/636: Performed by: COLON & RECTAL SURGERY

## 2017-05-20 PROCEDURE — 65270000029 HC RM PRIVATE

## 2017-05-20 PROCEDURE — 84520 ASSAY OF UREA NITROGEN: CPT | Performed by: COLON & RECTAL SURGERY

## 2017-05-20 PROCEDURE — 74011250637 HC RX REV CODE- 250/637: Performed by: INTERNAL MEDICINE

## 2017-05-20 PROCEDURE — 36415 COLL VENOUS BLD VENIPUNCTURE: CPT | Performed by: COLON & RECTAL SURGERY

## 2017-05-20 PROCEDURE — 74011250636 HC RX REV CODE- 250/636: Performed by: INTERNAL MEDICINE

## 2017-05-20 RX ORDER — OXYCODONE AND ACETAMINOPHEN 5; 325 MG/1; MG/1
1 TABLET ORAL
Qty: 60 TAB | Refills: 0 | Status: SHIPPED | OUTPATIENT
Start: 2017-05-20 | End: 2017-09-13

## 2017-05-20 RX ADMIN — Medication 10 ML: at 05:15

## 2017-05-20 RX ADMIN — HYDROMORPHONE HYDROCHLORIDE 1 MG: 2 INJECTION, SOLUTION INTRAMUSCULAR; INTRAVENOUS; SUBCUTANEOUS at 20:09

## 2017-05-20 RX ADMIN — ACETAMINOPHEN 650 MG: 325 TABLET, FILM COATED ORAL at 17:21

## 2017-05-20 RX ADMIN — SODIUM CHLORIDE 3 G: 900 INJECTION INTRAVENOUS at 08:10

## 2017-05-20 RX ADMIN — Medication 10 ML: at 02:10

## 2017-05-20 RX ADMIN — DEXTROSE MONOHYDRATE, SODIUM CHLORIDE, AND POTASSIUM CHLORIDE: 50; 9; 2.98 INJECTION, SOLUTION INTRAVENOUS at 11:29

## 2017-05-20 RX ADMIN — SODIUM CHLORIDE 3 G: 900 INJECTION INTRAVENOUS at 20:09

## 2017-05-20 RX ADMIN — HYDROMORPHONE HYDROCHLORIDE 1 MG: 2 INJECTION, SOLUTION INTRAMUSCULAR; INTRAVENOUS; SUBCUTANEOUS at 15:28

## 2017-05-20 RX ADMIN — OXYCODONE HYDROCHLORIDE 10 MG: 5 TABLET ORAL at 22:27

## 2017-05-20 RX ADMIN — HYDROMORPHONE HYDROCHLORIDE 1 MG: 2 INJECTION, SOLUTION INTRAMUSCULAR; INTRAVENOUS; SUBCUTANEOUS at 05:13

## 2017-05-20 RX ADMIN — ENOXAPARIN SODIUM 40 MG: 40 INJECTION SUBCUTANEOUS at 08:09

## 2017-05-20 RX ADMIN — VALSARTAN 160 MG: 160 TABLET ORAL at 08:09

## 2017-05-20 RX ADMIN — Medication 10 ML: at 13:52

## 2017-05-20 RX ADMIN — Medication 10 ML: at 22:27

## 2017-05-20 RX ADMIN — ACETAMINOPHEN 650 MG: 325 TABLET, FILM COATED ORAL at 11:30

## 2017-05-20 RX ADMIN — ACETAMINOPHEN 650 MG: 325 TABLET, FILM COATED ORAL at 23:43

## 2017-05-20 RX ADMIN — HYDROMORPHONE HYDROCHLORIDE 1 MG: 2 INJECTION, SOLUTION INTRAMUSCULAR; INTRAVENOUS; SUBCUTANEOUS at 09:49

## 2017-05-20 RX ADMIN — SODIUM CHLORIDE 3 G: 900 INJECTION INTRAVENOUS at 02:11

## 2017-05-20 RX ADMIN — OXYCODONE HYDROCHLORIDE 10 MG: 5 TABLET ORAL at 07:32

## 2017-05-20 RX ADMIN — ACETAMINOPHEN 650 MG: 325 TABLET, FILM COATED ORAL at 05:15

## 2017-05-20 RX ADMIN — SODIUM CHLORIDE 3 G: 900 INJECTION INTRAVENOUS at 13:52

## 2017-05-20 RX ADMIN — OXYCODONE HYDROCHLORIDE 10 MG: 5 TABLET ORAL at 17:28

## 2017-05-20 RX ADMIN — OXYCODONE HYDROCHLORIDE 10 MG: 5 TABLET ORAL at 02:24

## 2017-05-20 RX ADMIN — OXYCODONE HYDROCHLORIDE 10 MG: 5 TABLET ORAL at 12:57

## 2017-05-20 NOTE — PROGRESS NOTES
No complaints. Stool in stoma  Visit Vitals    /72    Pulse 73    Temp 98.3 °F (36.8 °C)    Resp 18    Ht 5' 2\" (1.575 m)    Wt 85.3 kg (188 lb)    SpO2 95%    BMI 34.39 kg/m2       Date 05/19/17 0700 - 05/20/17 0659 05/20/17 0700 - 05/21/17 0659   Shift 0210-4505 0706-6997 24 Hour Total 1264-7397 9181-0052 24 Hour Total   I  N  T  A  K  E   P.O.  100 100         P. O.  100 100       I.V.  (mL/kg/hr) 897.5  (0.9) 502.5  (0.5) 1400  (0.7)         Volume (dextrose 5% - 0.9% NaCl with KCl 40 mEq/L infusion) 397.5 402.5 800         Volume (0.9% sodium chloride infusion 250 mL) 0  0         Volume (ampicillin-sulbactam (UNASYN) 3 g in 0.9% sodium chloride (MBP/ADV) 100 mL) 400  400         Volume (ampicillin-sulbactam (UNASYN) 3 g in 0.9% sodium chloride (MBP/ADV) 100 mL MBP) 100 100 200       Shift Total  (mL/kg) 897.5  (10.5) 602.5  (7.1) 1500  (17.6)      O  U  T  P  U  T   Drains  30 30         Output (ml) (Elvin Drain #1 05/15/17 Anterior Abdomen)  30 30       Shift Total  (mL/kg)  30  (0.4) 30  (0.4)      .5 572.5 1470      Weight (kg) 85.3 85.3 85.3 85.3 85.3 85.3     abd soft  Stoma with stool in bag    A/P doing well  Advance diet  Home next week after more stoma teaching

## 2017-05-20 NOTE — PROGRESS NOTES
Hospitalist Progress Note    NAME: Shauna Broussard   :  1965   MRN:  [id number]       Interim Hospital Summary: 46 y.o. female whom presented on 2017 with      Assessment / Plan:    Acute Diverticulitis with perforation POA  -CT abdomen  Severe sigmoid colon diverticulitis now has perforated, and there are small fluid collections adjacent to the affected loops of sigmoid colon. Moderate pelvic ascites has worsened mildly. There is no identifiable colovesical fistula. -S/P sigmoid colectomy with end colostomy 5/15/17  -Leukocytosis resolved. Cultures with light lactobacillus, + anaerobes. Switch to Unasyn and transition to augmentin at discharge. Recheck CBC in AM  -advance to GI lite.      -continue oxycodone 10mg q4prn. IV dilaudid if not enough. Essential HTN POA   -continue Diovan use labetalol prn. Tobacco use POA    -Nicotine patch    Morbid obesity POA      Next of Kin:   Body mass index is 34.39 kg/(m^2). Code status: Full  Prophylaxis: SCD's  Recommended Disposition:  PT, OT, RN     Subjective:     Chief Complaint / Reason for Physician Visit  Follow up of HTN, perforated diverticulitis   Using less IV dilaudid. Wants more food    Review of Systems:  Symptom Y/N Comments  Symptom Y/N Comments   Fever/Chills    Chest Pain     Poor Appetite    Edema     Cough    Abdominal Pain y    Sputum    Joint Pain     SOB/MORAES    Pruritis/Rash     Nausea/vomit    Tolerating PT/OT     Diarrhea    Tolerating Diet     Constipation    Other       Could NOT obtain due to:      Objective:     VITALS:   Last 24hrs VS reviewed since prior progress note.  Most recent are:  Patient Vitals for the past 24 hrs:   Temp Pulse Resp BP SpO2   17 0731 98.3 °F (36.8 °C) 73 18 132/72 95 %   17 2345 98.2 °F (36.8 °C) 75 18 132/76 97 %   17 1923 98.1 °F (36.7 °C) 89 18 136/78 97 %   17 1527 98.4 °F (36.9 °C) 86 18 142/78 96 %       Intake/Output Summary (Last 24 hours) at 05/20/17 1219  Last data filed at 05/20/17 0559   Gross per 24 hour   Intake             1100 ml   Output               30 ml   Net             1070 ml        PHYSICAL EXAM:  General: WD, WN. Alert, cooperative, no acute distress    EENT:  EOMI. Anicteric sclerae. MMM  Resp:  CTA bilaterally, no wheezing or rales. No accessory muscle use  CV:  Regular  rhythm,  No edema  GI:  Soft, Non distended, + mildly tender  +Bowel sounds   (+) colostomy  Neurologic:  Alert and oriented X 3, normal speech,   Psych:   Good insight. Not anxious nor agitated  Skin:  No rashes. No jaundice    Reviewed most current lab test results and cultures  YES  Reviewed most current radiology test results   YES  Review and summation of old records today    NO  Reviewed patient's current orders and MAR    YES  PMH/SH reviewed - no change compared to H&P  ________________________________________________________________________  Care Plan discussed with:    Comments   Patient y    Family      RN y    Care Manager     Consultant                        Multidiciplinary team rounds were held today with , nursing, pharmacist and clinical coordinator. Patient's plan of care was discussed; medications were reviewed and discharge planning was addressed. ________________________________________________________________________  Total NON critical care TIME: 15   Minutes    Total CRITICAL CARE TIME Spent:   Minutes non procedure based      Comments   >50% of visit spent in counseling and coordination of care     ________________________________________________________________________  Desmond Andrade MD     Procedures: see electronic medical records for all procedures/Xrays and details which were not copied into this note but were reviewed prior to creation of Plan. LABS:  I reviewed today's most current labs and imaging studies.   Pertinent labs include:  Recent Labs      05/19/17   0238   WBC  16.9*   HGB  10.8*   HCT  33.3*   PLT 552*     Recent Labs      05/20/17   0216  05/18/17   0157   NA   --   140   K   --   3.7   CL   --   104   CO2   --   27   GLU   --   93   BUN  5*  6   CREA   --   0.37*   CA   --   8.0*       Signed: Yogi Patel MD

## 2017-05-20 NOTE — PROGRESS NOTES
End of Shift Nursing Note    Bedside shift change report given to Maddison Martines (oncoming nurse) by Mitch Damico RN (offgoing nurse). Report included the following information SBAR. Zone Phone:       Significant changes during shift:    none   Non-emergent issues for physician to address:   none     Number times ambulated in hallway past shift: 0      Number of times OOB to chair past shift: 0    POD #: 5     Vital Signs:    Temp: 98.2 °F (36.8 °C)     Pulse (Heart Rate): 75     BP: 132/76     Resp Rate: 18     O2 Sat (%): 97 %    Lines & Drains:     Urinary Catheter? No   Central Line? Yes   Placement Date: 5/15/2017   Medical Necessity: surgery. long term use ABX  PICC Line? No       NG tube [] in [] removed [x] not applicable   Drains [x] in [] removed [] not applicable     Skin Integrity:      Wounds: yes   Dressings Present: yes    Wound Concerns: no      GI:    Current diet:  DIET FULL LIQUID    Nausea: NO  Vomiting: NO  Bowel Sounds: YES  Flatus: YES  Last Bowel Movement: yesterday   Appearance:     Respiratory:  Supplemental O2: No        Incentive Spirometer: YES  Volume: 1250  Coughing and Deep Breathing: YES  Oral Care: YES  Understanding (patient/family education): YES   Getting out of bed: YES  Head of bed elevation: YES    Patient Safety:    Falls Score: 1  Mobility Score: 4  Bed Alarm On? No  Sitter? No      Opportunity for questions and clarification was given to oncoming nurse. Patient bed is in low position, side rails are up x 2, door & observation blinds open as needed, call bell within reach and patient not in distress.     Chester Maldonado

## 2017-05-20 NOTE — PROGRESS NOTES
End of Shift Nursing Note    Bedside shift change report given to Trenton Rausch RN (oncoming nurse) by Carlos Alberto Ambrocio RN (offgoing nurse). Report included the following information SBAR, Kardex and Intake/Output. Zone Phone:       Significant changes during shift:    none   Non-emergent issues for physician to address:   none     Number times ambulated in hallway past shift: 2      Number of times OOB to chair past shift: 2    POD #:      Vital Signs:    Temp: 98.3 °F (36.8 °C)     Pulse (Heart Rate): 73     BP: 132/72     Resp Rate: 18     O2 Sat (%): 95 %    Lines & Drains:     Urinary Catheter? No   Placement Date:    Medical Necessity:   Central Line? Yes   Placement Date: 5/15   Medical Necessity: yes  PICC Line? No   Placement Date:    Medical Necessity:     NG tube [] in [] removed [x] not applicable   Drains [x] in [] removed [] not applicable     Skin Integrity:      Wounds: yes   Dressings Present: yes    Wound Concerns: no      GI:    Current diet:  DIET GI LITE (POST SURGICAL)    Nausea: NO  Vomiting: NO  Bowel Sounds: YES  Flatus: YES  Last Bowel Movement: several days ago   Appearance:     Respiratory:  Supplemental O2: No      Device:    via  Liters/min     Incentive Spirometer: YES  Volume:   Coughing and Deep Breathing: YES  Oral Care: YES  Understanding (patient/family education): YES   Getting out of bed: YES  Head of bed elevation: YES    Patient Safety:    Falls Score: 1  Mobility Score: 1  Bed Alarm On? No  Sitter? No      Opportunity for questions and clarification was given to oncoming nurse. Patient bed is in low position, side rails are up x 2, door & observation blinds open as needed, call bell within reach and patient not in distress.     Zahida Rice

## 2017-05-20 NOTE — DISCHARGE INSTRUCTIONS
Maddy Medina MD, FACS  Dennis NICOLE. Halley Greer MD, FACS  Ashok Estevez. Eugenio Miranda MD, [address] [facility] Kimmy Mcclelland MD, [address] [facility] Fredis Gamboa MD, FACS  Alfonso Nicholson. MD Jayne Murray MD    [facility] Discharge Instructions for Colon Surgery Patients    1. Diagnosis: perforated diverticulitis  2. Restricted fiber diet. 3. Do not drive for 2 weeks or until after your next doctors appointment. 4. Leave steri-strips on incision. They may fall off on their own. 5. May take a shower. 6. No lifting any objects weighing more than 10 pounds. Do not do any housework, such as vacuuming, scrubbing, etc for at least a month. 7. When you get tired during the day, take naps, as you need your rest.  8. Multiple bowel movements are normal each day for a while. 9. May walk as desired. May go up and down stairs. 10. Take pain medication as prescribed: (NO DRIVING WHILE ON PAIN MEDICATIONS). Percocet EVERY 4-6 HOURS AS NEEDED. Other Medications: see med [facility]  Ostomy Supplies: provided by home health  11. See me in the office in 10-14 days. Call as soon as discharged for an appointment 21 [phone]. IF SURGERY INVOLVED AN OSTOMY BAG, PLEASE BRING YOUR SUPPLIES TO YOUR 1ST VISIT! 12.  Call the [facility] [phone], if you have any questions or problems after office hours.

## 2017-05-21 LAB
ANION GAP BLD CALC-SCNC: 5 MMOL/L (ref 5–15)
BASOPHILS # BLD AUTO: 0 K/UL (ref 0–0.1)
BASOPHILS # BLD: 0 % (ref 0–1)
BUN SERPL-MCNC: 4 MG/DL (ref 6–20)
BUN/CREAT SERPL: 11 (ref 12–20)
CALCIUM SERPL-MCNC: 7.9 MG/DL (ref 8.5–10.1)
CHLORIDE SERPL-SCNC: 108 MMOL/L (ref 97–108)
CO2 SERPL-SCNC: 31 MMOL/L (ref 21–32)
CREAT SERPL-MCNC: 0.38 MG/DL (ref 0.55–1.02)
EOSINOPHIL # BLD: 0.8 K/UL (ref 0–0.4)
EOSINOPHIL NFR BLD: 6 % (ref 0–7)
ERYTHROCYTE [DISTWIDTH] IN BLOOD BY AUTOMATED COUNT: 14.3 % (ref 11.5–14.5)
GLUCOSE SERPL-MCNC: 106 MG/DL (ref 65–100)
HCT VFR BLD AUTO: 31 % (ref 35–47)
HGB BLD-MCNC: 10 G/DL (ref 11.5–16)
LYMPHOCYTES # BLD AUTO: 21 % (ref 12–49)
LYMPHOCYTES # BLD: 2.8 K/UL (ref 0.8–3.5)
MCH RBC QN AUTO: 28.9 PG (ref 26–34)
MCHC RBC AUTO-ENTMCNC: 32.3 G/DL (ref 30–36.5)
MCV RBC AUTO: 89.6 FL (ref 80–99)
MONOCYTES # BLD: 0.9 K/UL (ref 0–1)
MONOCYTES NFR BLD AUTO: 7 % (ref 5–13)
NEUTS SEG # BLD: 8.5 K/UL (ref 1.8–8)
NEUTS SEG NFR BLD AUTO: 66 % (ref 32–75)
PLATELET # BLD AUTO: 519 K/UL (ref 150–400)
POTASSIUM SERPL-SCNC: 4.2 MMOL/L (ref 3.5–5.1)
RBC # BLD AUTO: 3.46 M/UL (ref 3.8–5.2)
SODIUM SERPL-SCNC: 144 MMOL/L (ref 136–145)
WBC # BLD AUTO: 13.1 K/UL (ref 3.6–11)

## 2017-05-21 PROCEDURE — 74011250636 HC RX REV CODE- 250/636: Performed by: INTERNAL MEDICINE

## 2017-05-21 PROCEDURE — 65270000029 HC RM PRIVATE

## 2017-05-21 PROCEDURE — 74011250637 HC RX REV CODE- 250/637: Performed by: INTERNAL MEDICINE

## 2017-05-21 PROCEDURE — 80048 BASIC METABOLIC PNL TOTAL CA: CPT | Performed by: INTERNAL MEDICINE

## 2017-05-21 PROCEDURE — 74011250636 HC RX REV CODE- 250/636: Performed by: COLON & RECTAL SURGERY

## 2017-05-21 PROCEDURE — 36415 COLL VENOUS BLD VENIPUNCTURE: CPT | Performed by: INTERNAL MEDICINE

## 2017-05-21 PROCEDURE — 85025 COMPLETE CBC W/AUTO DIFF WBC: CPT | Performed by: INTERNAL MEDICINE

## 2017-05-21 PROCEDURE — 74011000258 HC RX REV CODE- 258: Performed by: INTERNAL MEDICINE

## 2017-05-21 RX ORDER — AMOXICILLIN AND CLAVULANATE POTASSIUM 875; 125 MG/1; MG/1
1 TABLET, FILM COATED ORAL EVERY 12 HOURS
Status: DISCONTINUED | OUTPATIENT
Start: 2017-05-21 | End: 2017-05-24 | Stop reason: HOSPADM

## 2017-05-21 RX ADMIN — ACETAMINOPHEN 650 MG: 325 TABLET, FILM COATED ORAL at 17:59

## 2017-05-21 RX ADMIN — HYDROMORPHONE HYDROCHLORIDE 1 MG: 2 INJECTION, SOLUTION INTRAMUSCULAR; INTRAVENOUS; SUBCUTANEOUS at 11:53

## 2017-05-21 RX ADMIN — VALSARTAN 160 MG: 160 TABLET ORAL at 08:39

## 2017-05-21 RX ADMIN — ENOXAPARIN SODIUM 40 MG: 40 INJECTION SUBCUTANEOUS at 08:38

## 2017-05-21 RX ADMIN — HYDROMORPHONE HYDROCHLORIDE 1 MG: 2 INJECTION, SOLUTION INTRAMUSCULAR; INTRAVENOUS; SUBCUTANEOUS at 21:34

## 2017-05-21 RX ADMIN — HYDROMORPHONE HYDROCHLORIDE 1 MG: 2 INJECTION, SOLUTION INTRAMUSCULAR; INTRAVENOUS; SUBCUTANEOUS at 08:32

## 2017-05-21 RX ADMIN — HYDROMORPHONE HYDROCHLORIDE 1 MG: 2 INJECTION, SOLUTION INTRAMUSCULAR; INTRAVENOUS; SUBCUTANEOUS at 19:23

## 2017-05-21 RX ADMIN — DEXTROSE MONOHYDRATE, SODIUM CHLORIDE, AND POTASSIUM CHLORIDE: 50; 9; 2.98 INJECTION, SOLUTION INTRAVENOUS at 07:02

## 2017-05-21 RX ADMIN — SODIUM CHLORIDE 3 G: 900 INJECTION INTRAVENOUS at 08:37

## 2017-05-21 RX ADMIN — SODIUM CHLORIDE 3 G: 900 INJECTION INTRAVENOUS at 06:04

## 2017-05-21 RX ADMIN — Medication 1 CAPSULE: at 14:18

## 2017-05-21 RX ADMIN — ACETAMINOPHEN 650 MG: 325 TABLET, FILM COATED ORAL at 11:53

## 2017-05-21 RX ADMIN — HYDROMORPHONE HYDROCHLORIDE 1 MG: 2 INJECTION, SOLUTION INTRAMUSCULAR; INTRAVENOUS; SUBCUTANEOUS at 16:47

## 2017-05-21 RX ADMIN — OXYCODONE HYDROCHLORIDE 10 MG: 5 TABLET ORAL at 06:03

## 2017-05-21 RX ADMIN — AMOXICILLIN AND CLAVULANATE POTASSIUM 1 TABLET: 875; 125 TABLET, FILM COATED ORAL at 21:45

## 2017-05-21 RX ADMIN — Medication 10 ML: at 21:34

## 2017-05-21 RX ADMIN — ACETAMINOPHEN 650 MG: 325 TABLET, FILM COATED ORAL at 06:03

## 2017-05-21 RX ADMIN — Medication 10 ML: at 06:03

## 2017-05-21 RX ADMIN — HYDROMORPHONE HYDROCHLORIDE 1 MG: 2 INJECTION, SOLUTION INTRAMUSCULAR; INTRAVENOUS; SUBCUTANEOUS at 14:18

## 2017-05-21 RX ADMIN — HYDROMORPHONE HYDROCHLORIDE 1 MG: 2 INJECTION, SOLUTION INTRAMUSCULAR; INTRAVENOUS; SUBCUTANEOUS at 01:50

## 2017-05-21 NOTE — PROGRESS NOTES
Hospitalist Progress Note    NAME: Kathie Purdy   :  1965   MRN:  [id number]       Interim Hospital Summary: 46 y.o. female whom presented on 2017 with      Assessment / Plan:    Acute Diverticulitis with perforation POA  -CT abdomen  Severe sigmoid colon diverticulitis now has perforated, and there are small fluid collections adjacent to the affected loops of sigmoid colon. Moderate pelvic ascites has worsened mildly. There is no identifiable colovesical fistula. -S/P sigmoid colectomy with end colostomy 5/15/17  -Leukocytosis resolving. Cultures with light lactobacillus, + anaerobes. Switch to augmentin to complete 14 days total abx  -tolerating GI lite.    -continue oxycodone 10mg q4prn. Essential HTN POA   -continue Diovan use labetalol prn. Tobacco use POA    -Nicotine patch    Morbid obesity POA      Next of Kin:   Body mass index is 34.39 kg/(m^2). Code status: Full  Prophylaxis: SCD's  Recommended Disposition:  PT, OT, RN     Subjective:     Chief Complaint / Reason for Physician Visit  Follow up of HTN, perforated diverticulitis   Feels okay. Colostomy seems to be functioning well. Review of Systems:  Symptom Y/N Comments  Symptom Y/N Comments   Fever/Chills    Chest Pain     Poor Appetite    Edema     Cough    Abdominal Pain y    Sputum    Joint Pain     SOB/MORAES    Pruritis/Rash     Nausea/vomit    Tolerating PT/OT     Diarrhea    Tolerating Diet     Constipation    Other       Could NOT obtain due to:      Objective:     VITALS:   Last 24hrs VS reviewed since prior progress note.  Most recent are:  Patient Vitals for the past 24 hrs:   Temp Pulse Resp BP SpO2   17 0829 - 79 16 - 95 %   17 2343 98.7 °F (37.1 °C) 78 18 155/86 98 %   17 2020 98.4 °F (36.9 °C) 77 18 136/89 97 %   17 1541 98.1 °F (36.7 °C) 72 18 134/79 95 %       Intake/Output Summary (Last 24 hours) at 17 1157  Last data filed at 17 0604   Gross per 24 hour   Intake          2568.34 ml   Output              880 ml   Net          1688.34 ml        PHYSICAL EXAM:  General: WD, WN. Alert, cooperative, no acute distress    EENT:  EOMI. Anicteric sclerae. MMM  Resp:  CTA bilaterally, no wheezing or rales. No accessory muscle use  CV:  Regular  rhythm,  No edema  GI:  Soft, Non distended, + mildly tender  +Bowel sounds   (+) colostomy  Neurologic:  Alert and oriented X 3, normal speech,   Psych:   Good insight. Not anxious nor agitated  Skin:  No rashes. No jaundice    Reviewed most current lab test results and cultures  YES  Reviewed most current radiology test results   YES  Review and summation of old records today    NO  Reviewed patient's current orders and MAR    YES  PMH/SH reviewed - no change compared to H&P  ________________________________________________________________________  Care Plan discussed with:    Comments   Patient y    Family      RN y    Care Manager     Consultant                        Multidiciplinary team rounds were held today with , nursing, pharmacist and clinical coordinator. Patient's plan of care was discussed; medications were reviewed and discharge planning was addressed. ________________________________________________________________________  Total NON critical care TIME: 15   Minutes    Total CRITICAL CARE TIME Spent:   Minutes non procedure based      Comments   >50% of visit spent in counseling and coordination of care     ________________________________________________________________________  Desmond Andrade MD     Procedures: see electronic medical records for all procedures/Xrays and details which were not copied into this note but were reviewed prior to creation of Plan. LABS:  I reviewed today's most current labs and imaging studies.   Pertinent labs include:  Recent Labs      05/21/17   0155  05/19/17   0238   WBC  13.1*  16.9*   HGB  10.0*  10.8*   HCT  31.0*  33.3*   PLT  519*  552*     Recent Labs 05/21/17   0155  05/20/17   0216   NA  144   --    K  4.2   --    CL  108   --    CO2  31   --    GLU  106*   --    BUN  4*  5*   CREA  0.38*   --    CA  7.9*   --        Signed: Becky Lyn MD

## 2017-05-21 NOTE — PROGRESS NOTES
End of Shift Nursing Note    Bedside shift change report given to Leonetta Meckel RN (oncoming nurse) by Mitch Damico RN (offgoing nurse). Report included the following information SBAR. Zone Phone:       Significant changes during shift:    none   Non-emergent issues for physician to address:   none     Number times ambulated in hallway past shift: 0      Number of times OOB to chair past shift: 0    POD #: 6     Vital Signs:    Temp: 98.7 °F (37.1 °C)     Pulse (Heart Rate): 78     BP: 155/86     Resp Rate: 18     O2 Sat (%): 98 %    Lines & Drains:     Urinary Catheter? No   Central Line? Yes   Placement Date: 5/15/2017   Medical Necessity: IV ABX      NG tube [] in [] removed [x] not applicable   Drains [x] in [] removed [] not applicable     Skin Integrity:      Wounds: yes   Dressings Present: yes    Wound Concerns: no      GI:    Current diet:  DIET GI LITE (POST SURGICAL)    Nausea: NO  Vomiting: NO  Bowel Sounds: NO  Flatus: YES  Last Bowel Movement: today   Appearance: with colostomy    Respiratory:  Supplemental O2: No       Incentive Spirometer: YES  Volume: 1300  Coughing and Deep Breathing: YES  Oral Care: YES  Understanding (patient/family education): YES   Getting out of bed: YES  Head of bed elevation: YES    Patient Safety:    Falls Score: 1  Mobility Score: 4  Bed Alarm On? No  Sitter? No      Opportunity for questions and clarification was given to oncoming nurse. Patient bed is in low position, side rails are up x 2, door & observation blinds open as needed, call bell within reach and patient not in distress.     Chester Maldonado

## 2017-05-21 NOTE — PROGRESS NOTES
No complaints  Visit Vitals    /86 (BP 1 Location: Left arm, BP Patient Position: Post activity)    Pulse 79    Temp 98.7 °F (37.1 °C)    Resp 16    Ht 5' 2\" (1.575 m)    Wt 85.3 kg (188 lb)    SpO2 95%    BMI 34.39 kg/m2       Date 05/20/17 0700 - 05/21/17 0659 05/21/17 0700 - 05/22/17 0659   Shift [id number] 1900-0659 24 Hour Total 0700-1859 1900-0659 24 Hour Total   I  N  T  A  K  E   P.O. 240 250 490         P.O. 240 250 490       I.V.  (mL/kg/hr) 1114.2  (1.1) 964.2  (0.9) 2078.3  (1)         Volume (dextrose 5% - 0.9% NaCl with KCl 40 mEq/L infusion) 814.2 764.2 1578.3         Volume (0.9% sodium chloride infusion 250 mL) 0  0         Volume (ampicillin-sulbactam (UNASYN) 3 g in 0.9% sodium chloride (MBP/ADV) 100 mL MBP) 300 200 500       Shift Total  (mL/kg) 1354.2  (15.9) 1214.2  (14.2) 2568.3  (30.1)      O  U  T  P  U  T   Urine  (mL/kg/hr)  600  (0.6) 600  (0.3)         Urine Voided  600 600         Urine Occurrence(s)  4 x 4 x       Drains 5 25 30         Output (ml) (Elvin Drain #1 05/15/17 Anterior Abdomen) 5 25 30       Stool 75 175 250         Output (ml) (Colostomy 05/16/17) 75 175 250       Shift Total  (mL/kg) 80  (0.9) 800  (9.4) 880  (10.3)      NET 1274.2 414.2 1688. 3      Weight (kg) 85.3 85.3 85.3 85.3 85.3 85.3     abd soft    A/p doing well  Stoma teaching  [id number] Shantel Rojas to go home tomorrow from my standpoint  Rx on the chart. DC instructions on the chart.

## 2017-05-22 PROCEDURE — 65270000029 HC RM PRIVATE

## 2017-05-22 PROCEDURE — 77030010520

## 2017-05-22 PROCEDURE — 74011250636 HC RX REV CODE- 250/636: Performed by: INTERNAL MEDICINE

## 2017-05-22 PROCEDURE — 74011250637 HC RX REV CODE- 250/637: Performed by: INTERNAL MEDICINE

## 2017-05-22 PROCEDURE — 74011250636 HC RX REV CODE- 250/636: Performed by: COLON & RECTAL SURGERY

## 2017-05-22 PROCEDURE — 77030010541

## 2017-05-22 RX ORDER — OXYCODONE HYDROCHLORIDE 5 MG/1
10 TABLET ORAL
Status: DISCONTINUED | OUTPATIENT
Start: 2017-05-22 | End: 2017-05-24 | Stop reason: HOSPADM

## 2017-05-22 RX ADMIN — OXYCODONE HYDROCHLORIDE 10 MG: 5 TABLET ORAL at 22:38

## 2017-05-22 RX ADMIN — HYDROMORPHONE HYDROCHLORIDE 1 MG: 2 INJECTION, SOLUTION INTRAMUSCULAR; INTRAVENOUS; SUBCUTANEOUS at 04:22

## 2017-05-22 RX ADMIN — ACETAMINOPHEN 650 MG: 325 TABLET, FILM COATED ORAL at 19:46

## 2017-05-22 RX ADMIN — HYDROMORPHONE HYDROCHLORIDE 1 MG: 2 INJECTION, SOLUTION INTRAMUSCULAR; INTRAVENOUS; SUBCUTANEOUS at 06:24

## 2017-05-22 RX ADMIN — AMOXICILLIN AND CLAVULANATE POTASSIUM 1 TABLET: 875; 125 TABLET, FILM COATED ORAL at 20:36

## 2017-05-22 RX ADMIN — OXYCODONE HYDROCHLORIDE 10 MG: 5 TABLET ORAL at 08:39

## 2017-05-22 RX ADMIN — Medication 10 ML: at 06:24

## 2017-05-22 RX ADMIN — HYDROMORPHONE HYDROCHLORIDE 1 MG: 2 INJECTION, SOLUTION INTRAMUSCULAR; INTRAVENOUS; SUBCUTANEOUS at 01:47

## 2017-05-22 RX ADMIN — ACETAMINOPHEN 650 MG: 325 TABLET, FILM COATED ORAL at 00:21

## 2017-05-22 RX ADMIN — ACETAMINOPHEN 650 MG: 325 TABLET, FILM COATED ORAL at 12:34

## 2017-05-22 RX ADMIN — ACETAMINOPHEN 650 MG: 325 TABLET, FILM COATED ORAL at 07:04

## 2017-05-22 RX ADMIN — Medication 1 CAPSULE: at 08:39

## 2017-05-22 RX ADMIN — VALSARTAN 160 MG: 160 TABLET ORAL at 08:39

## 2017-05-22 RX ADMIN — AMOXICILLIN AND CLAVULANATE POTASSIUM 1 TABLET: 875; 125 TABLET, FILM COATED ORAL at 08:39

## 2017-05-22 RX ADMIN — OXYCODONE HYDROCHLORIDE 10 MG: 5 TABLET ORAL at 19:45

## 2017-05-22 RX ADMIN — OXYCODONE HYDROCHLORIDE 10 MG: 5 TABLET ORAL at 16:44

## 2017-05-22 RX ADMIN — HYDROMORPHONE HYDROCHLORIDE 1 MG: 2 INJECTION, SOLUTION INTRAMUSCULAR; INTRAVENOUS; SUBCUTANEOUS at 00:16

## 2017-05-22 RX ADMIN — Medication 10 ML: at 20:37

## 2017-05-22 RX ADMIN — ENOXAPARIN SODIUM 40 MG: 40 INJECTION SUBCUTANEOUS at 08:39

## 2017-05-22 RX ADMIN — OXYCODONE HYDROCHLORIDE 10 MG: 5 TABLET ORAL at 13:44

## 2017-05-22 NOTE — PROGRESS NOTES
Interdisciplinary Rounds were completed on this patient. Rounds included nursing, clinical care leader, pharmacy, and case management. Patient was doing well without problems. Patient had the following concerns: wound care for ostomy prior to discharge. Goals for the day will include: possible discharge. Patient would like to work with wound care one last time prior to discharge. [facility]

## 2017-05-22 NOTE — ROUTINE PROCESS
End of Shift Nursing Note    Bedside shift change report given to Sheri Bower RN (oncoming nurse) by Janusz Bocanegra RN (offgoing nurse). Report included the following information SBAR. [facility] Phone:   [phone]    Significant changes during shift:    D/C tomorrow with home health   Non-emergent issues for physician to address:        Number times ambulated in hallway past shift: 0      Number of times OOB to chair past shift: 1    POD #:      Vital Signs:    Temp: 97.9 °F (36.6 °C)     Pulse (Heart Rate): 87     BP: (!) 157/94     Resp Rate: 16     O2 Sat (%): 93 %    Lines & Drains:     Urinary Catheter? No   Placement Date:    Medical Necessity:   Central Line? No   Placement Date:    Medical Necessity:   PICC Line? No   Placement Date:    Medical Necessity:     NG tube [] in [] removed [] not applicable   Drains [] in [] removed [] not applicable     Skin Integrity:      Wounds: yes   Dressings Present: yes    Wound Concerns: no      GI:    Current diet:  DIET GI LITE (POST SURGICAL)    Nausea: NO  Vomiting: NO  Bowel Sounds: NO  Flatus: NO  Last Bowel Movement: today   Appearance:     Respiratory:  Supplemental O2: No      Device:    via  Liters/min     Incentive Spirometer: YES  Volume:   Coughing and Deep Breathing: YES  Oral Care: YES  Understanding (patient/family education): YES   Getting out of bed: YES  Head of bed elevation: YES    Patient Safety:    Falls Score: 1  Mobility Score: 1  Bed Alarm On? No  Sitter? No      Opportunity for questions and clarification was given to oncoming nurse. Patient bed is in lowest position, side rails are up x 2, door & observation blinds open as needed, call bell within reach and patient not in distress.     Delroy Cervantes RN

## 2017-05-22 NOTE — PROGRESS NOTES
End of Shift Nursing Note    Bedside shift change report given to Dang Chaudhary (oncoming nurse) by Jose Mello (offgoing nurse). Report included the following information SBAR, Kardex, Intake/Output, MAR and Recent Results. Zone Phone:   [phone]    Significant changes during shift:    none   Non-emergent issues for physician to address:   none     Number times ambulated in hallway past shift: 1      Number of times OOB to chair past shift: 3    POD #:      Vital Signs:    Temp: 98.7 °F (37.1 °C)     Pulse (Heart Rate): 73     BP: (!) 151/92     Resp Rate: 16     O2 Sat (%): 96 %    Lines & Drains:     Urinary Catheter? No   Placement Date:    Medical Necessity:   Central Line? Placement Date:    Medical Necessity:   PICC Line? Yes   Placement Date:    Medical Necessity: long term IV therapy    NG tube [] in [] removed [] not applicable   Drains [] in [] removed [] not applicable     Skin Integrity:      Wounds: no   Dressings Present: no    Wound Concerns: no      GI:    Current diet:  DIET GI LITE (POST SURGICAL)    Nausea: NO  Vomiting: NO  Bowel Sounds: YES  Flatus: YES  Last Bowel Movement: today   Appearance: green, loose    Respiratory:  Supplemental O2: No      Device:    via  Liters/min     Incentive Spirometer: YES  Volume:   Coughing and Deep Breathing: YES  Oral Care: YES  Understanding (patient/family education): YES   Getting out of bed: YES  Head of bed elevation: YES    Patient Safety:    Falls Score: 1  Mobility Score: 1  Bed Alarm On? No  Sitter? No      Opportunity for questions and clarification was given to oncoming nurse. Patient bed is in locked,low position, side rails are up x 2, door & observation blinds open as needed, call bell within reach and patient not in distress.     Jeannie Quinones RN

## 2017-05-22 NOTE — PROGRESS NOTES
Hospitalist Progress Note    NAME: Kiesha Leonard   :  1965   MRN:  [id number]       Interim Hospital Summary: 46 y.o. female whom presented on 2017 with      Assessment / Plan:    Acute Diverticulitis with perforation POA  -CT abdomen  Severe sigmoid colon diverticulitis now has perforated, and there are small fluid collections adjacent to the affected loops of sigmoid colon. Moderate pelvic ascites has worsened mildly. There is no identifiable colovesical fistula. -S/P sigmoid colectomy with end colostomy 5/15/17  -Leukocytosis resolving. Cultures with light lactobacillus, + anaerobes. Switch to augmentin to complete 14 days total abx  -tolerating GI lite.    -change oxycodone 10mg q3-4 hr prn.     -CM arranging for [facility] RN for colostomy care. Waiting for confirmation from provider prior to discharge. Essential HTN POA   -continue Diovan use labetalol prn. Tobacco use POA    -Nicotine patch    Morbid obesity POA      Next of Kin:   Body mass index is 34.39 kg/(m^2). Code status: Full  Prophylaxis: SCD's  Recommended Disposition:  PT, OT, RN     Subjective:     Chief Complaint / Reason for Physician Visit  Follow up of HTN, perforated diverticulitis   Feels okay. Needed some IV dilaudid earlier    Review of Systems:  Symptom Y/N Comments  Symptom Y/N Comments   Fever/Chills    Chest Pain     Poor Appetite    Edema     Cough    Abdominal Pain y    Sputum    Joint Pain     SOB/MORAES    Pruritis/Rash     Nausea/vomit    Tolerating PT/OT     Diarrhea    Tolerating Diet     Constipation    Other       Could NOT obtain due to:      Objective:     VITALS:   Last 24hrs VS reviewed since prior progress note.  Most recent are:  Patient Vitals for the past 24 hrs:   Temp Pulse Resp BP SpO2   17 1217 98.5 °F (36.9 °C) 74 17 140/77 93 %   17 0834 98.7 °F (37.1 °C) 72 16 142/87 94 %   17 0300 98 °F (36.7 °C) 72 17 150/68 -   17 2146 98.3 °F (36.8 °C) 78 18 173/83 -   05/21/17 1718 - - - - 96 %   05/21/17 1646 - 73 16 (!) 151/92 97 %       Intake/Output Summary (Last 24 hours) at 05/22/17 1514  Last data filed at 05/22/17 1426   Gross per 24 hour   Intake              780 ml   Output              385 ml   Net              395 ml        PHYSICAL EXAM:  General: WD, WN. Alert, cooperative, no acute distress    EENT:  EOMI. Anicteric sclerae. MMM  Resp:  CTA bilaterally, no wheezing or rales. No accessory muscle use  CV:  Regular  rhythm,  No edema  GI:  Soft, Non distended, + mildly tender  +Bowel sounds   (+) colostomy  Neurologic:  Alert and oriented X 3, normal speech,   Psych:   Good insight. Not anxious nor agitated  Skin:  No rashes. No jaundice    Reviewed most current lab test results and cultures  YES  Reviewed most current radiology test results   YES  Review and summation of old records today    NO  Reviewed patient's current orders and MAR    YES  PMH/SH reviewed - no change compared to H&P  ________________________________________________________________________  Care Plan discussed with:    Comments   Patient y    Family      RN y    Care Manager y    Consultant                        Multidiciplinary team rounds were held today with , nursing, pharmacist and clinical coordinator. Patient's plan of care was discussed; medications were reviewed and discharge planning was addressed. ________________________________________________________________________  Total NON critical care TIME: 15   Minutes    Total CRITICAL CARE TIME Spent:   Minutes non procedure based      Comments   >50% of visit spent in counseling and coordination of care     ________________________________________________________________________  Frances Rosales MD     Procedures: see electronic medical records for all procedures/Xrays and details which were not copied into this note but were reviewed prior to creation of Plan.       LABS:  I reviewed today's most current labs and imaging studies.   Pertinent labs include:  Recent Labs      05/21/17 0155   WBC  13.1*   HGB  10.0*   HCT  31.0*   PLT  519*     Recent Labs      05/21/17 0155 05/20/17 0216   NA  144   --    K  4.2   --    CL  108   --    CO2  31   --    GLU  106*   --    BUN  4*  5*   CREA  0.38*   --    CA  7.9*   --        Signed: Jolene Deng MD

## 2017-05-22 NOTE — WOUND CARE
Ostomy care: POD#7. Patient is ready for discharge, however, her insurance and [address] [place] Patient will stay another night until [address] is approved by her insurance. Patient is emptying her own pouch, KATHARINE removed, staples to midline abdomen intact. Ostomy nurse reviewed supply ordering and [facility] new ostomy program upon discharge. Patient given supplies for discharge home and [facility]. Pouch changed to day for anticipated discharge and should be changed again on Thursday or Friday. Pouch needs to be changed x2/week for the first month and then slowly move to every 5-7 days. Never longer then a week. Plan: patient will be discharged tomorrow with [address]. Please contact if services if unexpected issues occur before then. Ostomy nurse signing off case.   Albetr Perez RN, ON, zone ph# [phone]

## 2017-05-22 NOTE — PROGRESS NOTES
CRS    Doing well from our standpoint;  Looks like a delay on home health selection for one more day; Tolerating po and belly soft and benign; Will recheck CBC one more time since it was still up just a touch yesterday; Hopefully home tomorrow.

## 2017-05-22 NOTE — PROGRESS NOTES
CM contacted pt insurance to determine their list of private providers and then determine service location. CM was advised from [place] insurance to use their location provider, [facility] (reference ID: [id number]). CM faxed order, demographic sheet, notes from physicians and wound care nurse to [phone]. CM will be advised once a [facility] provider has been identified. Care Management Interventions  PCP Verified by CM: No (None )  Mode of Transport at Discharge:  Other (see comment) (private vehicle )  Transition of Care Consult (CM Consult): Home Health (TBD )  [address]: No  Reason Outside [place]: Out of service area  Discharge Durable Medical Equipment: No (pt has access to crutches )  Health Maintenance Reviewed: Yes  Physical Therapy Consult: No  Occupational Therapy Consult: No  Speech Therapy Consult: No  Current Support Network: Own Home, Other (pt's two sons reside with her )  Confirm Follow Up Transport: Self  Plan discussed with Pt/Family/Caregiver: Yes  Discharge Location  Discharge Placement: Home with home health    JOSY Shukla, [address] [place] [address]   [phone]

## 2017-05-22 NOTE — PROGRESS NOTES
Initial Nutrition Assessment:    INTERVENTIONS/RECOMMENDATIONS:   · Meals/Snacks: General/healthful diet:  Continue diet progression as tolerated. ASSESSMENT:   Chart reviewed; med noted for acute diverticulitis with perforation PTA. Pt is tolerating GI Lite Diet. Planned discharge tomorrow. No new nutrition concerns. Diet Order:  (GI Lite)  % Eaten:  Patient Vitals for the past 72 hrs:   % Diet Eaten   05/22/17 1426 100 %   05/22/17 0838 75 %   05/20/17 1352 75 %     Pertinent Medications: [x]Reviewed []Other  Pertinent Labs: [x]Reviewed []Other  Food Allergies: [x]None []Other   Last BM:    [x]Active     []Hyperactive  []Hypoactive       [] Absent BS  Skin:    [x] Intact   [] Incision  [] Breakdown  [] Other:    Anthropometrics:   Height: 5' 2\" (157.5 cm) Weight: 85.3 kg (188 lb)   IBW (%IBW):   ( ) UBW (%UBW):   (  %)   Last Weight Metrics:  Weight Loss Metrics 5/14/2017 5/1/2017 11/15/2016 7/25/2016 3/13/2016 9/2/2014 8/20/2011   Today's Wt 188 lb 188 lb 0.8 oz 199 lb 11.8 oz 196 lb 6.9 oz 198 lb 3.1 oz 152 lb 3.2 oz 185 lb   BMI 34.39 kg/m2 34.4 kg/m2 36.53 kg/m2 35.92 kg/m2 36.24 kg/m2 29.72 kg/m2 -       BMI: Body mass index is 34.39 kg/(m^2). This BMI is indicative of:   []Underweight    []Normal    [x]Overweight    [] Obesity   [] Extreme Obesity (BMI>40)     Estimated Nutrition Needs (Based on):   1830 Kcals/day (BMR (1408) x 1. 3AF) , 85 g (1.0 g/kg bw) Protein  Carbohydrate:  At Least 130 g/day  Fluids: 1800 mL/day (1ml/kcal)    Pt expected to meet estimated nutrient needs: [x]Yes []No    NUTRITION DIAGNOSES:   Problem:  Altered GI function      Etiology: related to perforated diverticulum     Signs/Symptoms: as evidenced by new ostomy      NUTRITION INTERVENTIONS:  Meals/Snacks: General/healthful diet                  GOAL:   PO intake at least 50% of meals next 2-4 days    LEARNING NEEDS (Diet, Food/Nutrient-Drug Interaction):    [x] None Identified   [] Identified and Education Provided/Documented   [] Identified and Pt declined/was not appropriate     Cultureal, Jewish, OR Ethnic Dietary Needs:    [x] None Identified   [] Identified and Addressed     [x] Interdisciplinary Care Plan Reviewed/Documented    [x] Discharge Planning: Continue Soft Diet as tolerated      MONITORING /EVALUATION:      Food/Nutrient Intake Outcomes:  Total energy intake  Physical Signs/Symptoms Outcomes: GI profile, Weight/weight change    NUTRITION RISK:    [] High              [x] Moderate           []  Low  []  Minimal/Uncompromised    PT SEEN FOR:    []  MD Consult: []Calorie Count      []Diabetic Diet Education        []Diet Education     []Electrolyte Management     []General Nutrition Management and Supplements     []Management of Tube Feeding     []TPN Recommendations    []  RN Referral:  []MST score >=2     []Enteral/Parenteral Nutrition PTA     []Pregnant: Gestational DM or Multigestation     []Pressure Ulcer/Wound Care needs        []  Low BMI  [x]  DTR Referral       Mark Swan, [address] [place] [address]  Pager [phone]  Weekend Pager [phone]

## 2017-05-23 VITALS
SYSTOLIC BLOOD PRESSURE: 142 MMHG | TEMPERATURE: 98.2 F | OXYGEN SATURATION: 97 % | WEIGHT: 188 LBS | HEIGHT: 62 IN | BODY MASS INDEX: 34.6 KG/M2 | HEART RATE: 79 BPM | DIASTOLIC BLOOD PRESSURE: 75 MMHG | RESPIRATION RATE: 18 BRPM

## 2017-05-23 LAB
BASOPHILS # BLD AUTO: 0 K/UL (ref 0–0.1)
BASOPHILS # BLD: 0 % (ref 0–1)
EOSINOPHIL # BLD: 0.6 K/UL (ref 0–0.4)
EOSINOPHIL NFR BLD: 6 % (ref 0–7)
ERYTHROCYTE [DISTWIDTH] IN BLOOD BY AUTOMATED COUNT: 14.6 % (ref 11.5–14.5)
HCT VFR BLD AUTO: 32 % (ref 35–47)
HGB BLD-MCNC: 10.4 G/DL (ref 11.5–16)
LYMPHOCYTES # BLD AUTO: 31 % (ref 12–49)
LYMPHOCYTES # BLD: 2.9 K/UL (ref 0.8–3.5)
MCH RBC QN AUTO: 29.3 PG (ref 26–34)
MCHC RBC AUTO-ENTMCNC: 32.5 G/DL (ref 30–36.5)
MCV RBC AUTO: 90.1 FL (ref 80–99)
MONOCYTES # BLD: 0.7 K/UL (ref 0–1)
MONOCYTES NFR BLD AUTO: 8 % (ref 5–13)
NEUTS SEG # BLD: 5.1 K/UL (ref 1.8–8)
NEUTS SEG NFR BLD AUTO: 55 % (ref 32–75)
PLATELET # BLD AUTO: 525 K/UL (ref 150–400)
RBC # BLD AUTO: 3.55 M/UL (ref 3.8–5.2)
WBC # BLD AUTO: 9.2 K/UL (ref 3.6–11)

## 2017-05-23 PROCEDURE — 74011250637 HC RX REV CODE- 250/637: Performed by: INTERNAL MEDICINE

## 2017-05-23 PROCEDURE — 65270000029 HC RM PRIVATE

## 2017-05-23 PROCEDURE — 74011250636 HC RX REV CODE- 250/636: Performed by: COLON & RECTAL SURGERY

## 2017-05-23 PROCEDURE — 36415 COLL VENOUS BLD VENIPUNCTURE: CPT | Performed by: COLON & RECTAL SURGERY

## 2017-05-23 PROCEDURE — 85025 COMPLETE CBC W/AUTO DIFF WBC: CPT | Performed by: COLON & RECTAL SURGERY

## 2017-05-23 RX ORDER — IBUPROFEN 200 MG
1 TABLET ORAL EVERY 24 HOURS
Qty: 30 PATCH | Refills: 0 | Status: SHIPPED | OUTPATIENT
Start: 2017-05-23 | End: 2017-06-22

## 2017-05-23 RX ORDER — AMOXICILLIN AND CLAVULANATE POTASSIUM 875; 125 MG/1; MG/1
1 TABLET, FILM COATED ORAL EVERY 12 HOURS
Qty: 10 TAB | Refills: 0 | Status: SHIPPED | OUTPATIENT
Start: 2017-05-23 | End: 2017-05-28

## 2017-05-23 RX ADMIN — OXYCODONE HYDROCHLORIDE 10 MG: 5 TABLET ORAL at 01:38

## 2017-05-23 RX ADMIN — Medication 10 ML: at 06:42

## 2017-05-23 RX ADMIN — OXYCODONE HYDROCHLORIDE 10 MG: 5 TABLET ORAL at 11:28

## 2017-05-23 RX ADMIN — OXYCODONE HYDROCHLORIDE 10 MG: 5 TABLET ORAL at 08:26

## 2017-05-23 RX ADMIN — ACETAMINOPHEN 650 MG: 325 TABLET, FILM COATED ORAL at 13:44

## 2017-05-23 RX ADMIN — Medication 1 CAPSULE: at 08:26

## 2017-05-23 RX ADMIN — OXYCODONE HYDROCHLORIDE 10 MG: 5 TABLET ORAL at 14:43

## 2017-05-23 RX ADMIN — AMOXICILLIN AND CLAVULANATE POTASSIUM 1 TABLET: 875; 125 TABLET, FILM COATED ORAL at 08:26

## 2017-05-23 RX ADMIN — ACETAMINOPHEN 650 MG: 325 TABLET, FILM COATED ORAL at 06:42

## 2017-05-23 RX ADMIN — OXYCODONE HYDROCHLORIDE 10 MG: 5 TABLET ORAL at 17:33

## 2017-05-23 RX ADMIN — ENOXAPARIN SODIUM 40 MG: 40 INJECTION SUBCUTANEOUS at 08:27

## 2017-05-23 RX ADMIN — VALSARTAN 160 MG: 160 TABLET ORAL at 08:26

## 2017-05-23 RX ADMIN — ACETAMINOPHEN 650 MG: 325 TABLET, FILM COATED ORAL at 00:06

## 2017-05-23 NOTE — PROGRESS NOTES
End of Shift Nursing Note    Bedside shift change report given to [id number] [place] [address] (oncoming nurse) by Rufus Turner RN (offgoing nurse). Report included the following information SBAR, Kardex, Procedure Summary, Intake/Output and Recent Results. Zone Phone:   [phone]    Significant changes during shift:    none   Non-emergent issues for physician to address:   none     Number times ambulated in hallway past shift: 0      Number of times OOB to chair past shift: 0    POD #: 8     Vital Signs:    Temp: 98.1 °F (36.7 °C)     Pulse (Heart Rate): 69     BP: 122/74     Resp Rate: 18     O2 Sat (%): 95 %    Lines & Drains:     Urinary Catheter? No  Central Line? No  PICC Line? Yes   Placement Date: 5/15/2017   Medical Necessity: surgical procedure    NG tube [] in [] removed [x] not applicable   Drains [] in [x] removed [] not applicable     Skin Integrity:      Wounds: yes   Dressings Present: yes    Wound Concerns: no      GI:    Current diet:  DIET GI LITE (POST SURGICAL)    Nausea: NO  Vomiting: NO  Bowel Sounds: YES  Flatus: YES  Last Bowel Movement: today   Appearance: dark green/soft    Respiratory:  Supplemental O2: No       Incentive Spirometer: YES  Volume: 1500  Coughing and Deep Breathing: YES  Oral Care: YES  Understanding (patient/family education): YES   Getting out of bed: YES  Head of bed elevation: YES    Patient Safety:    Falls Score: 1  Mobility Score: 1  Bed Alarm On? No  Sitter? No  Opportunity for questions and clarification was given to oncoming nurse. Patient bed is in low position, side rails are up x 2, door & observation blinds open as needed, call bell within reach and patient not in distress.     Tory Rakes

## 2017-05-23 NOTE — PROGRESS NOTES
Pt will discharge home today with [facility] services. CM has been advised that pt's [place] provider is [facility]. CM updated AVS to reflect [place] provider. Pt will be transported by spouse at discharge. CM faxed discharge summary to [facility] with [facility] at [phone]. There were no additional discharge needs at this time. Care Management Interventions  PCP Verified by CM: No (None )  Mode of Transport at Discharge:  Other (see comment) (private vehicle )  Transition of Care Consult (CM Consult): Home Health ([facility] )  [address]: No  Reason Outside [place]: Out of service area  Discharge Durable Medical Equipment: No (pt has access to crutches )  Health Maintenance Reviewed: Yes  Physical Therapy Consult: No  Occupational Therapy Consult: No  Speech Therapy Consult: No  Current Support Network: Own Home, Other (pt's two sons reside with her )  Confirm Follow Up Transport: Self  Plan discussed with Pt/Family/Caregiver: Yes  Discharge Location  Discharge Placement: Home with home health    JOSY Davis, [address] [place] [address]   [phone]

## 2017-05-23 NOTE — ROUTINE PROCESS
I have reviewed discharge instructions with the patient. The patient verbalized understanding. Central line removed. Three scripts given. Pt taken to main entrance via tech wheelchair.

## 2017-05-23 NOTE — DISCHARGE SUMMARY
Hospitalist Discharge Summary     Patient ID:  Ana María Sorto  [id number]  46 y.o.  1965    PCP on record: None    Admit date: 5/14/2017  Discharge date and time: 5/23/2017      DISCHARGE DIAGNOSIS:    Acute Diverticulitis with perforation POA  -S/P sigmoid colectomy with end colostomy 5/15/17  Essential HTN POA   Tobacco use POA   Morbid obesity POA      CONSULTATIONS:  IP CONSULT TO COLORECTAL SURGERY    Excerpted HPI from H&P of Ramses Graham MD:  CHIEF COMPLAINT: \"I have been having diarrhea and abdominal pain\"     HISTORY OF PRESENT ILLNESS:   46 y.o.   female   3 plus prior episodes of diverticulitis     Current episode began several weeks while she was out at her new house in [facility](moving from [place])  Increasing 9/10 abdominal pain in LLQ  [facility], treated with 2 courses of oral antibiotics as outpatient  Her pain was persistent despite the antibiotics and so she came to ED [facility]     5/1 to 5/6/2017 admitted at [address] [place] [address] 5/1 with severe sigmoid diverticulitis without evidence of perforation or abscess  Mild acute pancreatitis  Followed by Dr Efe Del Valle, plans for evaluation for OR given the multiple episodes once the current bout had resolved  Pain improved at discharge  D/C on 2 additional weeks Po levaquin and flagyl     Pain in LQ bilaterally increasing the past 3-4 days, now back up to 7/10, more diffuse across the lower abdomen  Developed frequent watery diarrhea past few days \"almost continuously\", no associate blood or melena  Episodes of N/V without blood or coffee grounds  No F/C  Severe episodes urine started looking like my poop\" with gas bubbles, including one episode in the ED  Positive moderate low back pain     ED Leukocytosis   CT scan with evidence of perforation now    ______________________________________________________________________  DISCHARGE SUMMARY/HOSPITAL COURSE:  for full details see H&P, daily progress notes, labs, consult notes. Acute Diverticulitis with perforation POA  -CT abdomen Severe sigmoid colon diverticulitis now has perforated, and there are small fluid collections adjacent to the affected loops of sigmoid colon. Moderate pelvic ascites has worsened mildly. There is no identifiable colovesical fistula. -S/P sigmoid colectomy with end colostomy 5/15/17  -Leukocytosis resolved. Cultures with light lactobacillus, + anaerobes. Switch to augmentin to complete 14 days total abx  -tolerating GI lite.   -home with oxycodone prn  -CM arranging for [facility] RN for colostomy care.     Essential HTN POA   -continue Diovan     Tobacco use POA   -Nicotine patch     Morbid obesity POA  Body mass index is 34.39 kg/(m^2). _______________________________________________________________________  Patient seen and examined by me on discharge day. Pertinent Findings:  Gen:    Not in distress  Chest: Clear lungs  CVS:   Regular rhythm. No edema  Abd:  Soft, not distended, not tender  Neuro:  Alert, Oriented x 4, grossly non focal exam  _______________________________________________________________________  DISCHARGE MEDICATIONS:   Current Discharge Medication List      START taking these medications    Details   amoxicillin-clavulanate (AUGMENTIN) 875-125 mg per tablet Take 1 Tab by mouth every twelve (12) hours for 5 days. Qty: 10 Tab, Refills: 0         CONTINUE these medications which have CHANGED    Details   nicotine (NICODERM CQ) 14 mg/24 hr patch 1 Patch by TransDERmal route every twenty-four (24) hours for 30 days. Qty: 30 Patch, Refills: 0      oxyCODONE-acetaminophen (PERCOCET) 5-325 mg per tablet Take 1 Tab by mouth every four (4) hours as needed for Pain. Max Daily Amount: 6 Tabs. Qty: 60 Tab, Refills: 0         CONTINUE these medications which have NOT CHANGED    Details   docusate sodium (COLACE) 100 mg capsule Take 100 mg by mouth as needed for Constipation.       ibuprofen (MOTRIN) 200 mg tablet Take 200 mg by mouth every eight (8) hours as needed for Pain. diphenhydrAMINE (BENADRYL) 25 mg capsule Take 25 mg by mouth every six (6) hours as needed. valsartan (DIOVAN) 160 mg tablet Take 1 Tab by mouth daily for 30 days. Qty: 30 Tab, Refills: 2      polyethylene glycol (MIRALAX) 17 gram packet Take 1 Packet by mouth daily as needed. For constipation  Qty: 30 Each, Refills: 1         STOP taking these medications       levoFLOXacin (LEVAQUIN) 750 mg tablet Comments:   Reason for Stopping:         metroNIDAZOLE (FLAGYL) 500 mg tablet Comments:   Reason for Stopping:         ondansetron (ZOFRAN ODT) 4 mg disintegrating tablet Comments:   Reason for Stopping:               My Recommended Diet, Activity, Wound Care, and follow-up labs are listed in the patient's Discharge Insturctions which I have personally completed and reviewed.     _______________________________________________________________________  DISPOSITION:    Home with Family: x   Home with HH/PT/OT/RN:    SNF/LTC:    TESFAYE:    OTHER:        Condition at Discharge:  Stable  _______________________________________________________________________  Follow up with:   PCP : None  Follow-up Information     Follow up With Details Comments Contact Info    Wes Melendez MD Schedule an appointment as soon as possible for a visit in 2 weeks  [address] [address] [phone]                Total time in minutes spent coordinating this discharge (includes going over instructions, follow-up, prescriptions, and preparing report for sign off to her PCP) :  35 minutes    Signed:  Vaibhav Devi MD

## 2017-05-23 NOTE — PROGRESS NOTES
Interdisciplinary Rounds were completed on this patient. Rounds included nursing, clinical care leader, pharmacy, and case management. Patient was doing well without problems. Patient had the following concerns: none. Goals for the day will include: mobilize and discharge to home with home health for ostomy care.

## 2017-09-13 RX ORDER — VALSARTAN 160 MG/1
TABLET ORAL DAILY
Status: ON HOLD | COMMUNITY
End: 2017-09-21

## 2017-09-18 ENCOUNTER — HOSPITAL ENCOUNTER (INPATIENT)
Age: 52
LOS: 3 days | Discharge: HOME | DRG: 331 | End: 2017-09-21
Attending: COLON & RECTAL SURGERY | Admitting: COLON & RECTAL SURGERY
Payer: COMMERCIAL

## 2017-09-18 ENCOUNTER — ANESTHESIA EVENT (OUTPATIENT)
Dept: SURGERY | Age: 52
DRG: 331 | End: 2017-09-18
Payer: COMMERCIAL

## 2017-09-18 ENCOUNTER — ANESTHESIA (OUTPATIENT)
Dept: SURGERY | Age: 52
DRG: 331 | End: 2017-09-18
Payer: COMMERCIAL

## 2017-09-18 PROBLEM — Z93.3 COLOSTOMY IN PLACE (HCC): Status: ACTIVE | Noted: 2017-09-18

## 2017-09-18 LAB
ANION GAP BLD CALC-SCNC: 16 MMOL/L (ref 5–15)
BUN BLD-MCNC: 18 MG/DL (ref 9–20)
CA-I BLD-MCNC: 1.2 MMOL/L (ref 1.12–1.32)
CHLORIDE BLD-SCNC: 107 MMOL/L (ref 98–107)
CO2 BLD-SCNC: 22 MMOL/L (ref 21–32)
CREAT BLD-MCNC: 0.7 MG/DL (ref 0.6–1.3)
GLUCOSE BLD-MCNC: 122 MG/DL (ref 65–100)
HCT VFR BLD CALC: 42 % (ref 35–47)
HGB BLD-MCNC: 14.3 GM/DL (ref 11.5–16)
POTASSIUM BLD-SCNC: 4.2 MMOL/L (ref 3.5–5.1)
SERVICE CMNT-IMP: ABNORMAL
SODIUM BLD-SCNC: 140 MMOL/L (ref 136–145)

## 2017-09-18 PROCEDURE — 77030031492 HC PRT ACC BLNT AIRSEAL CNMD -B: Performed by: COLON & RECTAL SURGERY

## 2017-09-18 PROCEDURE — 80048 BASIC METABOLIC PNL TOTAL CA: CPT | Performed by: COLON & RECTAL SURGERY

## 2017-09-18 PROCEDURE — 74011250636 HC RX REV CODE- 250/636: Performed by: ANESTHESIOLOGY

## 2017-09-18 PROCEDURE — 77030013567 HC DRN WND RESERV BARD -A: Performed by: COLON & RECTAL SURGERY

## 2017-09-18 PROCEDURE — 77030035489 HC REDUCR CANN ENDOWR INTU -C: Performed by: COLON & RECTAL SURGERY

## 2017-09-18 PROCEDURE — 77030012893

## 2017-09-18 PROCEDURE — 3E0T3CZ INTRODUCE REGIONAL ANESTH IN PERIPH NRV, PLEXI, PERC: ICD-10-PCS | Performed by: ANESTHESIOLOGY

## 2017-09-18 PROCEDURE — 80047 BASIC METABLC PNL IONIZED CA: CPT

## 2017-09-18 PROCEDURE — 77030032490 HC SLV COMPR SCD KNE COVD -B: Performed by: COLON & RECTAL SURGERY

## 2017-09-18 PROCEDURE — 74011000250 HC RX REV CODE- 250: Performed by: COLON & RECTAL SURGERY

## 2017-09-18 PROCEDURE — 77030026438 HC STYL ET INTUB CARD -A: Performed by: ANESTHESIOLOGY

## 2017-09-18 PROCEDURE — 77030018836 HC SOL IRR NACL ICUM -A: Performed by: COLON & RECTAL SURGERY

## 2017-09-18 PROCEDURE — 77030035279 HC SEAL VSL ENDOWR XI INTU -I2: Performed by: COLON & RECTAL SURGERY

## 2017-09-18 PROCEDURE — 74011250637 HC RX REV CODE- 250/637: Performed by: COLON & RECTAL SURGERY

## 2017-09-18 PROCEDURE — 76010000881 HC OR TIME 4.5 TO 5HR INTENSV - TIER 2: Performed by: COLON & RECTAL SURGERY

## 2017-09-18 PROCEDURE — 76210000017 HC OR PH I REC 1.5 TO 2 HR: Performed by: COLON & RECTAL SURGERY

## 2017-09-18 PROCEDURE — 83735 ASSAY OF MAGNESIUM: CPT | Performed by: COLON & RECTAL SURGERY

## 2017-09-18 PROCEDURE — 77030031139 HC SUT VCRL2 J&J -A: Performed by: COLON & RECTAL SURGERY

## 2017-09-18 PROCEDURE — 77030013079 HC BLNKT BAIR HGGR 3M -A: Performed by: ANESTHESIOLOGY

## 2017-09-18 PROCEDURE — 88304 TISSUE EXAM BY PATHOLOGIST: CPT | Performed by: COLON & RECTAL SURGERY

## 2017-09-18 PROCEDURE — 77030019908 HC STETH ESOPH SIMS -A: Performed by: ANESTHESIOLOGY

## 2017-09-18 PROCEDURE — 77030002966 HC SUT PDS J&J -A: Performed by: COLON & RECTAL SURGERY

## 2017-09-18 PROCEDURE — 77030010507 HC ADH SKN DERMBND J&J -B: Performed by: COLON & RECTAL SURGERY

## 2017-09-18 PROCEDURE — 77030020703 HC SEAL CANN DISP INTU -B: Performed by: COLON & RECTAL SURGERY

## 2017-09-18 PROCEDURE — 0D1B4Z4 BYPASS ILEUM TO CUTANEOUS, PERCUTANEOUS ENDOSCOPIC APPROACH: ICD-10-PCS | Performed by: COLON & RECTAL SURGERY

## 2017-09-18 PROCEDURE — 0DBM4ZZ EXCISION OF DESCENDING COLON, PERCUTANEOUS ENDOSCOPIC APPROACH: ICD-10-PCS | Performed by: COLON & RECTAL SURGERY

## 2017-09-18 PROCEDURE — 77030002916 HC SUT ETHLN J&J -A: Performed by: COLON & RECTAL SURGERY

## 2017-09-18 PROCEDURE — 85027 COMPLETE CBC AUTOMATED: CPT | Performed by: COLON & RECTAL SURGERY

## 2017-09-18 PROCEDURE — 76060000040 HC ANESTHESIA 4.5 TO 5 HR: Performed by: COLON & RECTAL SURGERY

## 2017-09-18 PROCEDURE — 77030002933 HC SUT MCRYL J&J -A: Performed by: COLON & RECTAL SURGERY

## 2017-09-18 PROCEDURE — 77030008684 HC TU ET CUF COVD -B: Performed by: ANESTHESIOLOGY

## 2017-09-18 PROCEDURE — 65270000029 HC RM PRIVATE

## 2017-09-18 PROCEDURE — 77030020782 HC GWN BAIR PAWS FLX 3M -B

## 2017-09-18 PROCEDURE — 77030034667 HC ACC PLATFRM ENDO GELPNT AMR -E: Performed by: COLON & RECTAL SURGERY

## 2017-09-18 PROCEDURE — 74011250636 HC RX REV CODE- 250/636: Performed by: COLON & RECTAL SURGERY

## 2017-09-18 PROCEDURE — 84100 ASSAY OF PHOSPHORUS: CPT | Performed by: COLON & RECTAL SURGERY

## 2017-09-18 PROCEDURE — 77030002996 HC SUT SLK J&J -A: Performed by: COLON & RECTAL SURGERY

## 2017-09-18 PROCEDURE — 77030010541: Performed by: COLON & RECTAL SURGERY

## 2017-09-18 PROCEDURE — 74011000250 HC RX REV CODE- 250

## 2017-09-18 PROCEDURE — 77030005515 HC CATH URETH FOL14 BARD -B: Performed by: COLON & RECTAL SURGERY

## 2017-09-18 PROCEDURE — 74011250636 HC RX REV CODE- 250/636

## 2017-09-18 PROCEDURE — 77030035277 HC OBTRTR BLDELSS DISP INTU -B: Performed by: COLON & RECTAL SURGERY

## 2017-09-18 PROCEDURE — 74011000258 HC RX REV CODE- 258

## 2017-09-18 PROCEDURE — 77030008756 HC TU IRR SUC STRY -B: Performed by: COLON & RECTAL SURGERY

## 2017-09-18 PROCEDURE — 77030008771 HC TU NG SALEM SUMP -A: Performed by: ANESTHESIOLOGY

## 2017-09-18 PROCEDURE — 0DN84ZZ RELEASE SMALL INTESTINE, PERCUTANEOUS ENDOSCOPIC APPROACH: ICD-10-PCS | Performed by: COLON & RECTAL SURGERY

## 2017-09-18 PROCEDURE — 0DNV4ZZ RELEASE MESENTERY, PERCUTANEOUS ENDOSCOPIC APPROACH: ICD-10-PCS | Performed by: COLON & RECTAL SURGERY

## 2017-09-18 PROCEDURE — 77030012407 HC DRN WND BARD -B: Performed by: COLON & RECTAL SURGERY

## 2017-09-18 PROCEDURE — 77030011640 HC PAD GRND REM COVD -A: Performed by: COLON & RECTAL SURGERY

## 2017-09-18 PROCEDURE — 0DJD8ZZ INSPECTION OF LOWER INTESTINAL TRACT, VIA NATURAL OR ARTIFICIAL OPENING ENDOSCOPIC: ICD-10-PCS | Performed by: COLON & RECTAL SURGERY

## 2017-09-18 PROCEDURE — 77030016151 HC PROTCTR LNS DFOG COVD -B: Performed by: COLON & RECTAL SURGERY

## 2017-09-18 RX ORDER — SODIUM CHLORIDE 0.9 % (FLUSH) 0.9 %
5-10 SYRINGE (ML) INJECTION AS NEEDED
Status: DISCONTINUED | OUTPATIENT
Start: 2017-09-18 | End: 2017-09-18 | Stop reason: HOSPADM

## 2017-09-18 RX ORDER — LOSARTAN POTASSIUM 50 MG/1
100 TABLET ORAL DAILY
Status: DISCONTINUED | OUTPATIENT
Start: 2017-09-19 | End: 2017-09-21 | Stop reason: HOSPADM

## 2017-09-18 RX ORDER — NEOSTIGMINE METHYLSULFATE 1 MG/ML
INJECTION INTRAVENOUS AS NEEDED
Status: DISCONTINUED | OUTPATIENT
Start: 2017-09-18 | End: 2017-09-18 | Stop reason: HOSPADM

## 2017-09-18 RX ORDER — SODIUM CHLORIDE 0.9 % (FLUSH) 0.9 %
5-10 SYRINGE (ML) INJECTION EVERY 8 HOURS
Status: DISCONTINUED | OUTPATIENT
Start: 2017-09-18 | End: 2017-09-18 | Stop reason: HOSPADM

## 2017-09-18 RX ORDER — ONDANSETRON 4 MG/1
4 TABLET, ORALLY DISINTEGRATING ORAL
Status: DISCONTINUED | OUTPATIENT
Start: 2017-09-18 | End: 2017-09-21 | Stop reason: HOSPADM

## 2017-09-18 RX ORDER — DIPHENHYDRAMINE HYDROCHLORIDE 50 MG/ML
12.5 INJECTION, SOLUTION INTRAMUSCULAR; INTRAVENOUS AS NEEDED
Status: DISCONTINUED | OUTPATIENT
Start: 2017-09-18 | End: 2017-09-18 | Stop reason: HOSPADM

## 2017-09-18 RX ORDER — ENOXAPARIN SODIUM 100 MG/ML
40 INJECTION SUBCUTANEOUS EVERY 24 HOURS
Status: DISCONTINUED | OUTPATIENT
Start: 2017-09-18 | End: 2017-09-21 | Stop reason: HOSPADM

## 2017-09-18 RX ORDER — ONDANSETRON 2 MG/ML
INJECTION INTRAMUSCULAR; INTRAVENOUS AS NEEDED
Status: DISCONTINUED | OUTPATIENT
Start: 2017-09-18 | End: 2017-09-18 | Stop reason: HOSPADM

## 2017-09-18 RX ORDER — SODIUM CHLORIDE 9 MG/ML
1000 INJECTION, SOLUTION INTRAVENOUS CONTINUOUS
Status: DISCONTINUED | OUTPATIENT
Start: 2017-09-18 | End: 2017-09-18 | Stop reason: HOSPADM

## 2017-09-18 RX ORDER — ONDANSETRON 2 MG/ML
4 INJECTION INTRAMUSCULAR; INTRAVENOUS
Status: DISCONTINUED | OUTPATIENT
Start: 2017-09-18 | End: 2017-09-21 | Stop reason: HOSPADM

## 2017-09-18 RX ORDER — SODIUM CHLORIDE 9 MG/ML
50 INJECTION, SOLUTION INTRAVENOUS CONTINUOUS
Status: DISCONTINUED | OUTPATIENT
Start: 2017-09-18 | End: 2017-09-20

## 2017-09-18 RX ORDER — DEXAMETHASONE SODIUM PHOSPHATE 4 MG/ML
INJECTION, SOLUTION INTRA-ARTICULAR; INTRALESIONAL; INTRAMUSCULAR; INTRAVENOUS; SOFT TISSUE AS NEEDED
Status: DISCONTINUED | OUTPATIENT
Start: 2017-09-18 | End: 2017-09-18 | Stop reason: HOSPADM

## 2017-09-18 RX ORDER — FENTANYL CITRATE 50 UG/ML
INJECTION, SOLUTION INTRAMUSCULAR; INTRAVENOUS AS NEEDED
Status: DISCONTINUED | OUTPATIENT
Start: 2017-09-18 | End: 2017-09-18 | Stop reason: HOSPADM

## 2017-09-18 RX ORDER — ROCURONIUM BROMIDE 10 MG/ML
INJECTION, SOLUTION INTRAVENOUS AS NEEDED
Status: DISCONTINUED | OUTPATIENT
Start: 2017-09-18 | End: 2017-09-18 | Stop reason: HOSPADM

## 2017-09-18 RX ORDER — OXYCODONE HYDROCHLORIDE 5 MG/1
5 TABLET ORAL
Status: DISCONTINUED | OUTPATIENT
Start: 2017-09-18 | End: 2017-09-21 | Stop reason: HOSPADM

## 2017-09-18 RX ORDER — OXYCODONE AND ACETAMINOPHEN 5; 325 MG/1; MG/1
1 TABLET ORAL AS NEEDED
Status: DISCONTINUED | OUTPATIENT
Start: 2017-09-18 | End: 2017-09-18 | Stop reason: HOSPADM

## 2017-09-18 RX ORDER — SODIUM CHLORIDE 9 MG/ML
50 INJECTION, SOLUTION INTRAVENOUS CONTINUOUS
Status: DISPENSED | OUTPATIENT
Start: 2017-09-18 | End: 2017-09-19

## 2017-09-18 RX ORDER — MIDAZOLAM HYDROCHLORIDE 1 MG/ML
0.5 INJECTION, SOLUTION INTRAMUSCULAR; INTRAVENOUS
Status: DISCONTINUED | OUTPATIENT
Start: 2017-09-18 | End: 2017-09-18 | Stop reason: HOSPADM

## 2017-09-18 RX ORDER — FENTANYL CITRATE 50 UG/ML
25 INJECTION, SOLUTION INTRAMUSCULAR; INTRAVENOUS
Status: COMPLETED | OUTPATIENT
Start: 2017-09-18 | End: 2017-09-18

## 2017-09-18 RX ORDER — SODIUM CHLORIDE, SODIUM LACTATE, POTASSIUM CHLORIDE, CALCIUM CHLORIDE 600; 310; 30; 20 MG/100ML; MG/100ML; MG/100ML; MG/100ML
125 INJECTION, SOLUTION INTRAVENOUS CONTINUOUS
Status: DISCONTINUED | OUTPATIENT
Start: 2017-09-18 | End: 2017-09-18 | Stop reason: HOSPADM

## 2017-09-18 RX ORDER — NALOXONE HYDROCHLORIDE 0.4 MG/ML
0.4 INJECTION, SOLUTION INTRAMUSCULAR; INTRAVENOUS; SUBCUTANEOUS AS NEEDED
Status: DISCONTINUED | OUTPATIENT
Start: 2017-09-18 | End: 2017-09-21 | Stop reason: HOSPADM

## 2017-09-18 RX ORDER — KETOROLAC TROMETHAMINE 30 MG/ML
15 INJECTION, SOLUTION INTRAMUSCULAR; INTRAVENOUS EVERY 6 HOURS
Status: COMPLETED | OUTPATIENT
Start: 2017-09-18 | End: 2017-09-19

## 2017-09-18 RX ORDER — LABETALOL HYDROCHLORIDE 5 MG/ML
INJECTION, SOLUTION INTRAVENOUS AS NEEDED
Status: DISCONTINUED | OUTPATIENT
Start: 2017-09-18 | End: 2017-09-18 | Stop reason: HOSPADM

## 2017-09-18 RX ORDER — DIPHENHYDRAMINE HCL 25 MG
25 CAPSULE ORAL
Status: DISCONTINUED | OUTPATIENT
Start: 2017-09-18 | End: 2017-09-21 | Stop reason: HOSPADM

## 2017-09-18 RX ORDER — FENTANYL CITRATE 50 UG/ML
50 INJECTION, SOLUTION INTRAMUSCULAR; INTRAVENOUS AS NEEDED
Status: DISCONTINUED | OUTPATIENT
Start: 2017-09-18 | End: 2017-09-18 | Stop reason: HOSPADM

## 2017-09-18 RX ORDER — HYDROMORPHONE HYDROCHLORIDE 1 MG/ML
0.2 INJECTION, SOLUTION INTRAMUSCULAR; INTRAVENOUS; SUBCUTANEOUS
Status: DISCONTINUED | OUTPATIENT
Start: 2017-09-18 | End: 2017-09-18 | Stop reason: HOSPADM

## 2017-09-18 RX ORDER — MIDAZOLAM HYDROCHLORIDE 1 MG/ML
INJECTION, SOLUTION INTRAMUSCULAR; INTRAVENOUS AS NEEDED
Status: DISCONTINUED | OUTPATIENT
Start: 2017-09-18 | End: 2017-09-18 | Stop reason: HOSPADM

## 2017-09-18 RX ORDER — MORPHINE SULFATE 10 MG/ML
2 INJECTION, SOLUTION INTRAMUSCULAR; INTRAVENOUS
Status: DISCONTINUED | OUTPATIENT
Start: 2017-09-18 | End: 2017-09-18 | Stop reason: HOSPADM

## 2017-09-18 RX ORDER — OXYCODONE AND ACETAMINOPHEN 5; 325 MG/1; MG/1
1 TABLET ORAL
Status: DISCONTINUED | OUTPATIENT
Start: 2017-09-18 | End: 2017-09-21 | Stop reason: HOSPADM

## 2017-09-18 RX ORDER — HYDROMORPHONE HYDROCHLORIDE 1 MG/ML
0.2 INJECTION, SOLUTION INTRAMUSCULAR; INTRAVENOUS; SUBCUTANEOUS
Status: DISCONTINUED | OUTPATIENT
Start: 2017-09-18 | End: 2017-09-21 | Stop reason: HOSPADM

## 2017-09-18 RX ORDER — MIDAZOLAM HYDROCHLORIDE 1 MG/ML
1 INJECTION, SOLUTION INTRAMUSCULAR; INTRAVENOUS AS NEEDED
Status: DISCONTINUED | OUTPATIENT
Start: 2017-09-18 | End: 2017-09-18 | Stop reason: HOSPADM

## 2017-09-18 RX ORDER — LIDOCAINE HYDROCHLORIDE 10 MG/ML
0.1 INJECTION, SOLUTION EPIDURAL; INFILTRATION; INTRACAUDAL; PERINEURAL AS NEEDED
Status: DISCONTINUED | OUTPATIENT
Start: 2017-09-18 | End: 2017-09-18 | Stop reason: HOSPADM

## 2017-09-18 RX ORDER — ALVIMOPAN 12 MG/1
12 CAPSULE ORAL 2 TIMES DAILY
Status: DISCONTINUED | OUTPATIENT
Start: 2017-09-19 | End: 2017-09-20

## 2017-09-18 RX ORDER — ONDANSETRON 2 MG/ML
4 INJECTION INTRAMUSCULAR; INTRAVENOUS AS NEEDED
Status: DISCONTINUED | OUTPATIENT
Start: 2017-09-18 | End: 2017-09-18 | Stop reason: HOSPADM

## 2017-09-18 RX ORDER — HYDROMORPHONE HYDROCHLORIDE 1 MG/ML
INJECTION, SOLUTION INTRAMUSCULAR; INTRAVENOUS; SUBCUTANEOUS AS NEEDED
Status: DISCONTINUED | OUTPATIENT
Start: 2017-09-18 | End: 2017-09-18 | Stop reason: HOSPADM

## 2017-09-18 RX ORDER — LIDOCAINE HYDROCHLORIDE 20 MG/ML
INJECTION, SOLUTION EPIDURAL; INFILTRATION; INTRACAUDAL; PERINEURAL AS NEEDED
Status: DISCONTINUED | OUTPATIENT
Start: 2017-09-18 | End: 2017-09-18 | Stop reason: HOSPADM

## 2017-09-18 RX ORDER — SUCCINYLCHOLINE CHLORIDE 20 MG/ML
INJECTION INTRAMUSCULAR; INTRAVENOUS AS NEEDED
Status: DISCONTINUED | OUTPATIENT
Start: 2017-09-18 | End: 2017-09-18 | Stop reason: HOSPADM

## 2017-09-18 RX ORDER — KETOROLAC TROMETHAMINE 30 MG/ML
INJECTION, SOLUTION INTRAMUSCULAR; INTRAVENOUS AS NEEDED
Status: DISCONTINUED | OUTPATIENT
Start: 2017-09-18 | End: 2017-09-18 | Stop reason: HOSPADM

## 2017-09-18 RX ORDER — ACETAMINOPHEN 10 MG/ML
INJECTION, SOLUTION INTRAVENOUS AS NEEDED
Status: DISCONTINUED | OUTPATIENT
Start: 2017-09-18 | End: 2017-09-18 | Stop reason: HOSPADM

## 2017-09-18 RX ORDER — PROPOFOL 10 MG/ML
INJECTION, EMULSION INTRAVENOUS AS NEEDED
Status: DISCONTINUED | OUTPATIENT
Start: 2017-09-18 | End: 2017-09-18 | Stop reason: HOSPADM

## 2017-09-18 RX ORDER — CELECOXIB 100 MG/1
100 CAPSULE ORAL 2 TIMES DAILY
Status: DISCONTINUED | OUTPATIENT
Start: 2017-09-19 | End: 2017-09-21 | Stop reason: HOSPADM

## 2017-09-18 RX ORDER — VALSARTAN 80 MG/1
160 TABLET ORAL DAILY
Status: DISCONTINUED | OUTPATIENT
Start: 2017-09-19 | End: 2017-09-18 | Stop reason: CLARIF

## 2017-09-18 RX ORDER — BUPIVACAINE HYDROCHLORIDE AND EPINEPHRINE 2.5; 5 MG/ML; UG/ML
30 INJECTION, SOLUTION EPIDURAL; INFILTRATION; INTRACAUDAL; PERINEURAL ONCE
Status: COMPLETED | OUTPATIENT
Start: 2017-09-18 | End: 2017-09-18

## 2017-09-18 RX ORDER — GLYCOPYRROLATE 0.2 MG/ML
INJECTION INTRAMUSCULAR; INTRAVENOUS AS NEEDED
Status: DISCONTINUED | OUTPATIENT
Start: 2017-09-18 | End: 2017-09-18 | Stop reason: HOSPADM

## 2017-09-18 RX ADMIN — ROCURONIUM BROMIDE 40 MG: 10 INJECTION, SOLUTION INTRAVENOUS at 07:52

## 2017-09-18 RX ADMIN — SUCCINYLCHOLINE CHLORIDE 140 MG: 20 INJECTION INTRAMUSCULAR; INTRAVENOUS at 07:42

## 2017-09-18 RX ADMIN — FENTANYL CITRATE 50 MCG: 50 INJECTION, SOLUTION INTRAMUSCULAR; INTRAVENOUS at 14:55

## 2017-09-18 RX ADMIN — SODIUM CHLORIDE, SODIUM LACTATE, POTASSIUM CHLORIDE, AND CALCIUM CHLORIDE 125 ML/HR: 600; 310; 30; 20 INJECTION, SOLUTION INTRAVENOUS at 06:36

## 2017-09-18 RX ADMIN — LIDOCAINE HYDROCHLORIDE 100 MG: 20 INJECTION, SOLUTION EPIDURAL; INFILTRATION; INTRACAUDAL; PERINEURAL at 07:42

## 2017-09-18 RX ADMIN — FENTANYL CITRATE 25 MCG: 50 INJECTION, SOLUTION INTRAMUSCULAR; INTRAVENOUS at 13:45

## 2017-09-18 RX ADMIN — FENTANYL CITRATE 25 MCG: 50 INJECTION, SOLUTION INTRAMUSCULAR; INTRAVENOUS at 13:50

## 2017-09-18 RX ADMIN — OXYCODONE HYDROCHLORIDE AND ACETAMINOPHEN 1 TABLET: 5; 325 TABLET ORAL at 19:35

## 2017-09-18 RX ADMIN — ONDANSETRON 4 MG: 4 TABLET, ORALLY DISINTEGRATING ORAL at 21:06

## 2017-09-18 RX ADMIN — FENTANYL CITRATE 25 MCG: 50 INJECTION, SOLUTION INTRAMUSCULAR; INTRAVENOUS at 14:00

## 2017-09-18 RX ADMIN — ROCURONIUM BROMIDE 20 MG: 10 INJECTION, SOLUTION INTRAVENOUS at 09:15

## 2017-09-18 RX ADMIN — ONDANSETRON HYDROCHLORIDE 4 MG: 2 INJECTION, SOLUTION INTRAVENOUS at 22:15

## 2017-09-18 RX ADMIN — KETOROLAC TROMETHAMINE 15 MG: 30 INJECTION, SOLUTION INTRAMUSCULAR at 18:38

## 2017-09-18 RX ADMIN — SODIUM CHLORIDE, SODIUM LACTATE, POTASSIUM CHLORIDE, AND CALCIUM CHLORIDE: 600; 310; 30; 20 INJECTION, SOLUTION INTRAVENOUS at 10:18

## 2017-09-18 RX ADMIN — MIDAZOLAM HYDROCHLORIDE 2 MG: 1 INJECTION, SOLUTION INTRAMUSCULAR; INTRAVENOUS at 07:30

## 2017-09-18 RX ADMIN — HYDROMORPHONE HYDROCHLORIDE 0.5 MG: 1 INJECTION, SOLUTION INTRAMUSCULAR; INTRAVENOUS; SUBCUTANEOUS at 12:03

## 2017-09-18 RX ADMIN — ROCURONIUM BROMIDE 10 MG: 10 INJECTION, SOLUTION INTRAVENOUS at 07:42

## 2017-09-18 RX ADMIN — DEXAMETHASONE SODIUM PHOSPHATE 8 MG: 4 INJECTION, SOLUTION INTRA-ARTICULAR; INTRALESIONAL; INTRAMUSCULAR; INTRAVENOUS; SOFT TISSUE at 08:08

## 2017-09-18 RX ADMIN — KETOROLAC TROMETHAMINE 15 MG: 30 INJECTION, SOLUTION INTRAMUSCULAR at 23:34

## 2017-09-18 RX ADMIN — SODIUM CHLORIDE 50 ML/HR: 900 INJECTION, SOLUTION INTRAVENOUS at 13:45

## 2017-09-18 RX ADMIN — HYDROMORPHONE HYDROCHLORIDE 0.5 MG: 1 INJECTION, SOLUTION INTRAMUSCULAR; INTRAVENOUS; SUBCUTANEOUS at 12:26

## 2017-09-18 RX ADMIN — ENOXAPARIN SODIUM 40 MG: 40 INJECTION SUBCUTANEOUS at 16:48

## 2017-09-18 RX ADMIN — SODIUM CHLORIDE, SODIUM LACTATE, POTASSIUM CHLORIDE, AND CALCIUM CHLORIDE: 600; 310; 30; 20 INJECTION, SOLUTION INTRAVENOUS at 08:56

## 2017-09-18 RX ADMIN — GLYCOPYRROLATE 0.5 MG: 0.2 INJECTION INTRAMUSCULAR; INTRAVENOUS at 11:59

## 2017-09-18 RX ADMIN — HYDROMORPHONE HYDROCHLORIDE 0.2 MG: 1 INJECTION, SOLUTION INTRAMUSCULAR; INTRAVENOUS; SUBCUTANEOUS at 22:15

## 2017-09-18 RX ADMIN — ONDANSETRON 4 MG: 2 INJECTION INTRAMUSCULAR; INTRAVENOUS at 11:42

## 2017-09-18 RX ADMIN — OXYCODONE HYDROCHLORIDE 5 MG: 5 TABLET ORAL at 16:45

## 2017-09-18 RX ADMIN — ROCURONIUM BROMIDE 20 MG: 10 INJECTION, SOLUTION INTRAVENOUS at 10:35

## 2017-09-18 RX ADMIN — FENTANYL CITRATE 25 MCG: 50 INJECTION, SOLUTION INTRAMUSCULAR; INTRAVENOUS at 13:10

## 2017-09-18 RX ADMIN — FENTANYL CITRATE 50 MCG: 50 INJECTION, SOLUTION INTRAMUSCULAR; INTRAVENOUS at 07:50

## 2017-09-18 RX ADMIN — PROPOFOL 150 MG: 10 INJECTION, EMULSION INTRAVENOUS at 07:42

## 2017-09-18 RX ADMIN — LABETALOL HYDROCHLORIDE 5 MG: 5 INJECTION, SOLUTION INTRAVENOUS at 09:43

## 2017-09-18 RX ADMIN — ACETAMINOPHEN 1000 MG: 10 INJECTION, SOLUTION INTRAVENOUS at 11:55

## 2017-09-18 RX ADMIN — NEOSTIGMINE METHYLSULFATE 4 MG: 1 INJECTION INTRAVENOUS at 11:59

## 2017-09-18 RX ADMIN — Medication 10 ML: at 14:56

## 2017-09-18 RX ADMIN — KETOROLAC TROMETHAMINE 30 MG: 30 INJECTION, SOLUTION INTRAMUSCULAR; INTRAVENOUS at 12:00

## 2017-09-18 RX ADMIN — FENTANYL CITRATE 50 MCG: 50 INJECTION, SOLUTION INTRAMUSCULAR; INTRAVENOUS at 10:58

## 2017-09-18 RX ADMIN — FENTANYL CITRATE 50 MCG: 50 INJECTION, SOLUTION INTRAMUSCULAR; INTRAVENOUS at 08:14

## 2017-09-18 RX ADMIN — FENTANYL CITRATE 100 MCG: 50 INJECTION, SOLUTION INTRAMUSCULAR; INTRAVENOUS at 07:42

## 2017-09-18 NOTE — PERIOP NOTES
Patient: Mikael Bernheim MRN: [id number]  SSN: [id number]   YOB: 1965  Age: 46 y.o. Sex: female     Patient is status post Procedure(s):  ROBOTIC COLOSTOMY TAKEDOWN, LYSIS OF ADHESIONS, diverting loop ileostomy. Surgeon(s) and Role:     * Yelitza Barajas MD - Primary    Local/Dose/Irrigation:  30 ML OF 0.25% MARCAINE WITH EPI                  Peripheral IV 09/18/17 Left Wrist (Active)          Eduardo-Mccloud Drain 09/18/17 Right; Lower Abdomen (Active)   Site Assessment Clean, dry, & intact 9/18/2017 12:07 PM   Dressing Status Clean, dry, & intact 9/18/2017 12:07 PM   Status Charged 9/18/2017 12:07 PM   Drainage Color Serosanguinous 9/18/2017 12:07 PM       Colostomy 05/16/17 (Active)       Colostomy 09/18/17 Left; Lower  Abdomen (Active)      Airway - Endotracheal Tube 09/18/17 Oral (Active)                   Dressing/Packing:  Wound Abdomen-DRESSING TYPE: Topical skin adhesive/glue; Other (Comment) (bandaid over drain site, ILEOSTOMY POUCH) (09/18/17 1100)  Splint/Cast:  ]    Other:  16 FR JOSHI, 19FR KATHARINE DRAIN, ILEOSTOMY

## 2017-09-18 NOTE — OP NOTES
[address] [facility] [address]   OP NOTE       Name:  Jessica Armstrong   MR#:  [id number]   :  1965   Account #:  [de-identified]    Surgery Date:  2017   Date of Adm:  2017       PREOPERATIVE DIAGNOSIS: History of perforated diverticulitis   requiring colostomy. POSTOPERATIVE DIAGNOSIS: History of perforated diverticulitis   requiring colostomy. PROCEDURES PERFORMED    1. Robotic colostomy takedown, with colorectal anastomosis and   diverting loop ileostomy. 2. Takedown of the splenic flexure. 3. Flexible sigmoidoscopy. SPECIMENS REMOVED: Proximal and distal anastomotic donuts sent   to Pathology. ESTIMATED BLOOD LOSS: 50cc    ANESTHESIA:  General +30cc 0.25%marcaine w epi    DISPOSITION: The patient tolerated the procedure well and was   transferred to recovery in stable condition. INDICATIONS: This patient has a history of perforated diverticulitis. She required a colostomy after cool off of several months. She is ready   for colostomy takedown. Risks, benefits and alternatives were   discussed. DESCRIPTION OF PROCEDURE: She was taken to the operating   room, placed on the table in normal supine position. After induction of   anesthesia and time-out had been performed, 0.25% Marcaine with   epinephrine was infiltrated in the skin to create a local anesthetic block. I had previously closed off the stoma with a pursestring. I took the   stoma down, and placed an anvil in the stoma using a 2-0 Prolene to   create mucosal post-apposition. I then placed a GelPort with a stapler   and staple cannula in that location. I then I placed an 8 mm trocar in   the left lateral position, periumbilical and right lateral position, and an 8   mm trocar in the right upper quadrant, which was an AirSeal port. I   then placed the patient in steep Trendelenburg position.  I performed a   lysis of adhesions for about an hour today in total. There were flimsy   adhesions to the small bowel and omentum, which had to be taken   down. I was able to identify the colon. First, I identified that we were   going to mobilize the splenic flexure. I then turned my attention to the   pelvis. The uterus was retracted with the third arm, and I had difficulty   finding the rectal stump. I placed a dilator up the rectum and in the   vagina to identify the difference between the two structures. I was able   to dissect out the rectal stump. I identified a small defect in the rectal   stump, and I put a 3-0 Vicryl suture in that. Once I had this rectal   stump dissected out, I felt like this was going to be a low rectal   anastomosis, and I felt like she was going to need a diverting loop   ileostomy, so I began preparing for that. I dissected out the remainder   of the descending colon and splenic flexure robotically. Once there   was no tension on this anastomosis, I was able to create it with this 29   mm anvil. The donuts were completely intact. The leak test was   negative. Again, since this was a low anastomosis, I felt the safest   option would be to give her a diverting loop ileostomy while this   anastomosis healed. In addition, the rectal stump was very difficult to   dissect out today, and it would be very difficult to reenter this pelvis   and dissect out, and hope for her to have enough rectum to create   anastomosis, should that be disastrous leak. Therefore, I felt like a   diverting loop ileostomy would be the safest option. Once that   anastomosis was created, I inspected it with a flexible sigmoidoscopy. The anastomosis was about 4 cm to 5 cm from the anal verge, and it   was intact. It did not leak. The donuts were completely intact. I then   placed a 19 round Elvin drain in the pelvis through the right lower   quadrant incision on the opposite side of the stoma. I brought the   bowel out in proper orientation. I then placed a stoma renato.  I secured   this with 3-0 nylon. I then closed all the incisions using 4-0 Monocryl in   a subcuticular fashion. Dermabond was used as a sterile dressing. I   then matured the stoma with a Shefali ileostomy fashion. Stoma   appliance was placed. The patient tolerated the procedure well, and   was transferred to recovery in stable condition.         Drew Kirks, MD Valentino Coombes / Sofi Stephenson   D:  09/18/2017   12:17   T:  09/18/2017   12:45   Job #:  [id number]

## 2017-09-18 NOTE — PROGRESS NOTES
1830:  Patient tearful and requesting/insisting to speak with MD.  Patient states PACU RN informed her that Dr. Lili Perrin would be up to the room to speak with them and has not yet seen him. Patient very agitated and has many questions regarding today's procedure. Spoke with Dr. Chloe Rea, who will text Dr. Lili Perrin about patient. 1852:  Dr. Lili Perrin to see patient. Discussed pain medications and how computer D/C'd percocet. Verbal order to reorder Percocet 5-325 1 tab Q4    2100: Attempted to ambulate patient. Patient able to sit up at the side of the bed and stand, but nausea made patient unable to attempt walking. PRN zofran given. Will try again later. 2200:  Patient complaining of continued nausea and pain. Will page MD on call. 2203:  Dr. Chloe Rea notified. Orders received for 4mg IV zofran and 0.2mg dilaudid Q3H.

## 2017-09-18 NOTE — PERIOP NOTES
TRANSFER - OUT REPORT:    Verbal report given to toño (name) on Joni Johnson  being transferred to room [address] [place] [address] (unit) for routine post - op       Report consisted of patients Situation, Background, Assessment and   Recommendations(SBAR). Time Pre op antibiotic given:0757  Anesthesia Stop time: 9312  Simpson Present on Transfer to floor:yes  Order for Simpson on Chart:yes    Information from the following report(s) SBAR, Kardex, OR Summary, Intake/Output, MAR, Med Rec Status and Cardiac Rhythm SR was reviewed with the receiving nurse. Opportunity for questions and clarification was provided. Is the patient on 02? YES       L/Min 2L         Is the patient on a monitor? NO    Is the nurse transporting with the patient? NO    Surgical Waiting Area notified of patient's transfer from PACU? YES      The following personal items collected during your admission accompanied patient upon transfer:   Dental Appliance: Dental Appliances: None  Vision: Visual Aid: Glasses (READERS)  Hearing Aid:    Jewelry:    Clothing: Clothing:  (bag of clothes returned to patient)  Other Valuables:  Other Valuables: Eyeglasses (returned to patient)  Valuables sent to safe:

## 2017-09-18 NOTE — BRIEF OP NOTE
BRIEF OPERATIVE NOTE    Date of Procedure: 9/18/2017   Preoperative Diagnosis: HX OF PERFORATED DIVERTICULITIS, S/P SIGMOID COLECTOMY WITH END ILEOSTOMY  Postoperative Diagnosis: HX OF PERFORATED DIVERTICULITIS, S/P SIGMOID COLECTOMY WITH END ILEOSTOMY    Procedure(s):  ROBOTIC COLOSTOMY TAKEDOWN (REQUEST XI), LYSIS OF ADHESIONS, diverting loop ileostomy  Surgeon(s) and Role:     * Matt Ortiz MD - Primary         Assistant Staff:       Surgical Staff:  Circ-1: Colt Mckeon RN  Circ-Relief: Desiree Chavez; Gloria Pinto RN; Lyn Nathan RN  Scrub Tech-1: Patti Hermosillo  Scrub RN-Relief: Dewitte Flmercedes Hooe  Surg Asst-1: Jonny Gregory  Event Time In   Incision Start 0818   Incision Close      Anesthesia: General   Estimated Blood Loss: 25cc  Specimens:   ID Type Source Tests Collected by Time Destination   1 : COLOSTOMY Fresh Colostomy Site  Matt Ortiz MD 9/18/2017 9446 Pathology   2 : PROXIMAL DONUT Fresh Tissue  Matt Ortiz MD 9/18/2017 1135 Pathology   3 : DISTAL DONUT Fresh Tissue  Matt Ortiz MD 9/18/2017 1135 Pathology      Findings: low rectal anastomosis required protective loop ileostomy  Complications: none apparent  Implants: * No implants in log *

## 2017-09-18 NOTE — IP AVS SNAPSHOT
[address] [phone] Patient: Mary Schroeder MRN: [id number] :1965 You are allergic to the following Allergen Reactions Lisinopril Cough Recent Documentation Height Weight BMI OB Status Smoking Status 1.575 m 94.1 kg 37.94 kg/m2 Postmenopausal Former Smoker Emergency Contacts Name Discharge Info Relation Home Work Mobile Fredy Kinney DISCHARGE CAREGIVER [3] Spouse [3]   [phone] About your hospitalization You were admitted on:  2017 [phone] You were discharged on:  2017 Unit phone number:  [phone] Why you were hospitalized Your primary diagnosis was:  Not on File Your diagnoses also included:  Colostomy In Place (Hcc) Providers Seen During Your Hospitalizations Provider Role Specialty Primary office phone Tomas South MD Attending Provider Colon and Rectal Surgery [phone] Your Primary Care Physician (PCP) Primary Care Physician Office Phone Office Fax NONE ** None ** ** None ** Follow-up Information Follow up With Details Comments Contact Info Tomas South MD On 2017 Hospital Follow Up Appointment: (Spoke with Minoo Duncan to schedule); 17 at 1:30PM at [address] 
[phone] In 2 weeks Please call to obtain a new patient appointment with this MD or any other MD in your area. Dr. Daryl Weber 54 Current Discharge Medication List  
  
START taking these medications Dose & Instructions Dispensing Information Comments Morning Noon Evening Bedtime  
 loperamide 2 mg capsule Commonly known as:  IMODIUM Your last dose was: Your next dose is:    
   
   
 Dose:  2 mg Take 1 Cap by mouth four (4) times daily as needed for Diarrhea for up to 10 days. Quantity:  120 Cap Refills:  2  
     
   
   
   
  
 oxyCODONE IR 5 mg immediate release tablet Commonly known as:  Major Arcadio Your last dose was: Your next dose is:    
   
   
 Dose:  5 mg Take 1 Tab by mouth every four (4) hours as needed. Max Daily Amount: 30 mg.  
 Quantity:  60 Tab Refills:  0 CONTINUE these medications which have CHANGED Dose & Instructions Dispensing Information Comments Morning Noon Evening Bedtime  
 valsartan 160 mg tablet Commonly known as:  DIOVAN What changed:  how much to take Your last dose was: Your next dose is:    
   
   
 Dose:  160 mg Take 1 Tab by mouth daily for 30 days. Quantity:  30 Tab Refills:  1 CONTINUE these medications which have NOT CHANGED Dose & Instructions Dispensing Information Comments Morning Noon Evening Bedtime BENADRYL 25 mg capsule Generic drug:  diphenhydrAMINE Your last dose was: Your next dose is:    
   
   
 Dose:  25 mg Take 25 mg by mouth every six (6) hours as needed. Refills:  0  
     
   
   
   
  
 BIOTIN PO Your last dose was: Your next dose is: Take  by mouth daily. Refills:  0 ASK your doctor about these medications Dose & Instructions Dispensing Information Comments Morning Noon Evening Bedtime  
 ibuprofen 200 mg tablet Commonly known as:  MOTRIN Your last dose was: Your next dose is:    
   
   
 Dose:  200 mg Take 200 mg by mouth every eight (8) hours as needed for Pain. Refills:  0 Where to Get Your Medications Information on where to get these meds will be given to you by the nurse or doctor. ! Ask your nurse or doctor about these medications  
  loperamide 2 mg capsule oxyCODONE IR 5 mg immediate release tablet  
 valsartan 160 mg tablet Discharge Instructions Maylin Cardoso MD, FACS Dennis NICOLE. Michaelle Lin MD, FACS Naif NICOLE. Juana Cullen MD, FACS Gudelia Tse. Kristan Haq MD, FACS Gudelia Weinberg MD, FACS Will Souza. MD Janette Bustamante MD 
 
[facility] Discharge Instructions for Colon Surgery Patients 1. Diagnosis: colostomy takedown 2. Restricted fiber diet. 3. Do not drive for 2 weeks or until after your next doctors appointment. 4. Leave steri-strips on incision. They may fall off on their own. 5. May take a shower. 6. No lifting any objects weighing more than 10 pounds. Do not do any housework, such as vacuuming, scrubbing, etc for at least a month. 7. When you get tired during the day, take naps, as you need your rest. 
8. Multiple bowel movements are normal each day for a while. 9. May walk as desired. May go up and down stairs. 10. Take pain medication as prescribed: (NO DRIVING WHILE ON PAIN MEDICATIONS). Tylenol, motrin, or percocet EVERY 4-6 HOURS AS NEEDED. Other Medications: see med rec Ostomy Supplies: provided by home health 11. See me in the office in 10-14 days. Call as soon as discharged for an appointment 21 [phone]. IF SURGERY INVOLVED AN OSTOMY BAG, PLEASE BRING YOUR SUPPLIES TO YOUR 1ST VISIT! 12.  Call the Exchange [phone], if you have any questions or problems after office hours. Discharge Orders None [employer] Announcement We are excited to announce that we are making your provider's discharge notes available to you in [employer]. You will see these notes when they are completed and signed by the physician that discharged you from your recent hospital stay.   If you have any questions or concerns about any information you see in [employer], please call the Health Information Department where you were seen or reach out to your Primary Care Provider for more information about your plan of care. Introducing [facility]! Zakiya Barros introduces "[employer] patient portal. Now you can access parts of your medical record, email your doctor's office, and request medication refills online. 1. In your internet browser, go to [email or web address] 2. Click on the First Time User? Click Here link in the Sign In box. You will see the New Member Sign Up page. 3. Enter your "[employer] Access Code exactly as it appears below. You will not need to use this code after youve completed the sign-up process. If you do not sign up before the expiration date, you must request a new code. · "[employer] Access Code: [email or web address] Expires: 12/20/2017  9:14 AM 
 
4. Enter the last four digits of your Social Security Number (xxxx) and Date of Birth (mm/dd/yyyy) as indicated and click Submit. You will be taken to the next sign-up page. 5. Create a "[employer] ID. This will be your "[employer] login ID and cannot be changed, so think of one that is secure and easy to remember. 6. Create a "[employer] password. You can change your password at any time. 7. Enter your Password Reset Question and Answer. This can be used at a later time if you forget your password. 8. Enter your e-mail address. You will receive e-mail notification when new information is available in [address] 9. Click Sign Up. You can now view and download portions of your medical record. 10. Click the Download Summary menu link to download a portable copy of your medical information. If you have questions, please visit the Frequently Asked Questions section of the "[employer] website. Remember, "[employer] is NOT to be used for urgent needs. For medical emergencies, dial 911. Now available from your iPhone and Android! General Information Please provide this summary of care documentation to your next provider.  
  
  
    
    
 Patient Signature: ____________________________________________________________ Date:  ____________________________________________________________  
  
Arna Luis Provider Signature:  ____________________________________________________________ Date:  ____________________________________________________________

## 2017-09-18 NOTE — H&P
Colon and Rectal Surgery History and Physical    Subjective:      Brigido Mejia is a 46 y.o. female who has a history of perforated diverticulitis    Patient Active Problem List    Diagnosis Date Noted    Colostomy in place [facility]) 09/18/2017    Perforated diverticulum 05/14/2017    Diverticulitis 03/13/2016    Tobacco abuse 03/13/2016    Intestinal diverticular abscess 08/16/2011     Past Medical History:   Diagnosis Date    Hypertension     Morbid obesity ([address]     Sigmoid diverticulitis     Tobacco use       Past Surgical History:   Procedure Laterality Date    HX GI  05/2017    COLOSTOMY-PERFORATED BOWEL D/T DIVERTICULITIS    HX GYN      ectopic pregnancy    HX GYN      BTL      Social History   Substance Use Topics    Smoking status: Former Smoker     Packs/day: 1.50     Types: Cigarettes     Quit date: 5/15/2017    Smokeless tobacco: Never Used    Alcohol use 0.0 oz/week     0 Standard drinks or equivalent per week      Comment: socially      Family History   Problem Relation Age of Onset    Cancer Mother      UTERINE    Cancer Father      COLON    Cancer Sister      SKIN-UNKNOWN TYPE    Anesth Problems Neg Hx       Prior to Admission medications    Medication Sig Start Date End Date Taking? Authorizing Provider   BIOTIN PO Take  by mouth daily. Yes Historical Provider   valsartan (DIOVAN) 160 mg tablet Take  by mouth daily. Yes Historical Provider   ibuprofen (MOTRIN) 200 mg tablet Take 200 mg by mouth every eight (8) hours as needed for Pain. Yes Historical Provider   diphenhydrAMINE (BENADRYL) 25 mg capsule Take 25 mg by mouth every six (6) hours as needed. Yes Historical Provider     Allergies   Allergen Reactions    Lisinopril Cough        Review of Systems:    A comprehensive review of systems was negative except for that written in the History of Present Illness.     Objective:     Visit Vitals    BP (!) 155/101 (BP 1 Location: Left arm, BP Patient Position: At rest)    Pulse 84    Temp 98 °F (36.7 °C)    Resp 16    Ht 5' 2\" (1.575 m)    Wt 79.4 kg (175 lb)    LMP 08/26/2014    SpO2 98%    BMI 32.01 kg/m2        Physical Exam:   nad  Chest clear  Heart reg  abd soft    Imaging:  images and reports reviewed    Lab Review:    Recent Results (from the past 24 hour(s))   POC CHEM8    Collection Time: 09/18/17  6:37 AM   Result Value Ref Range    Calcium, ionized (POC) 1.20 1. 12 - 1.32 MMOL/L    Sodium (POC) 140 136 - 145 MMOL/L    Potassium (POC) 4.2 3.5 - 5.1 MMOL/L    Chloride (POC) 107 98 - 107 MMOL/L    CO2 (POC) 22 21 - 32 MMOL/L    Anion gap (POC) 16 (H) 5 - 15 mmol/L    Glucose (POC) 122 (H) 65 - 100 MG/DL    BUN (POC) 18 9 - 20 MG/DL    Creatinine (POC) 0.7 0.6 - 1.3 MG/DL    GFRAA, POC >60 >60 ml/min/1.73m2    GFRNA, POC >60 >60 ml/min/1.73m2    Hemoglobin (POC) 14.3 11.5 - 16.0 GM/DL    Hematocrit (POC) 42 35.0 - 47.0 %    Comment Comment Not Indicated. Labs and radiology: images and reports reviewed      Assessment:     colostomy    Plan:     1. I recommend proceeding with robotic colostomy takedown. Treatment alternatives were discussed. 2. Discussed aspects of surgical intervention, methods, risks (including by not limited to infection, bleeding, hematoma, and perforation of the intestines or solid organs), and the risks of general anesthetic. The patient understands the risks; any and all questions were answered to the patient's satisfaction.     Signed By: Patricia Barba MD     September 18, 2017

## 2017-09-18 NOTE — ANESTHESIA PREPROCEDURE EVALUATION
Anesthetic History   No history of anesthetic complications            Review of Systems / Medical History  Patient summary reviewed, nursing notes reviewed and pertinent labs reviewed    Pulmonary  Within defined limits                 Neuro/Psych   Within defined limits           Cardiovascular    Hypertension                   GI/Hepatic/Renal  Within defined limits              Endo/Other        Morbid obesity     Other Findings            Physical Exam    Airway  Mallampati: II  TM Distance: > 6 cm  Neck ROM: normal range of motion   Mouth opening: Normal     Cardiovascular  Regular rate and rhythm,  S1 and S2 normal,  no murmur, click, rub, or gallop             Dental  No notable dental hx       Pulmonary  Breath sounds clear to auscultation               Abdominal  GI exam deferred       Other Findings            Anesthetic Plan    ASA: 3  Anesthesia type: general          Induction: Intravenous  Anesthetic plan and risks discussed with: Patient

## 2017-09-18 NOTE — ANESTHESIA POSTPROCEDURE EVALUATION
Post-Anesthesia Evaluation and Assessment    Patient: Shai Stanley MRN: [id number]  SSN: [id number]    YOB: 1965  Age: 46 y.o. Sex: female       Cardiovascular Function/Vital Signs  Visit Vitals    /87    Pulse 76    Temp 36.4 °C (97.6 °F)    Resp 10    Ht 5' 2\" (1.575 m)    Wt 79.4 kg (175 lb)    SpO2 95%    BMI 32.01 kg/m2       Patient is status post general anesthesia for Procedure(s):  ROBOTIC COLOSTOMY TAKEDOWN, LYSIS OF ADHESIONS, diverting loop ileostomy. Nausea/Vomiting: None    Postoperative hydration reviewed and adequate. Pain:  Pain Scale 1: Numeric (0 - 10) (09/18/17 1311)  Pain Intensity 1: 5 (09/18/17 1311)   Managed    Neurological Status:   Neuro (WDL): Within Defined Limits (09/18/17 1235)  Neuro  Neurologic State: Drowsy; Eyes open to voice; Pharmacologically induced (comment) (09/18/17 1235)  LUE Motor Response: Purposeful (09/18/17 1235)  LLE Motor Response: Purposeful (09/18/17 1235)  RUE Motor Response: Purposeful (09/18/17 1235)  RLE Motor Response: Purposeful (09/18/17 1235)   At baseline    Mental Status and Level of Consciousness: Arousable    Pulmonary Status:   O2 Device: Nasal cannula (09/18/17 1235)   Adequate oxygenation and airway patent    Complications related to anesthesia: None    Post-anesthesia assessment completed.  No concerns    Signed By: Sultana Travis MD     September 18, 2017

## 2017-09-18 NOTE — IP AVS SNAPSHOT
[address] [address] 
[phone] Patient: Sweta Rockwell MRN: [id number] :1965 Current Discharge Medication List  
  
START taking these medications Dose & Instructions Dispensing Information Comments Morning Noon Evening Bedtime  
 loperamide 2 mg capsule Commonly known as:  IMODIUM Your last dose was: Your next dose is:    
   
   
 Dose:  2 mg Take 1 Cap by mouth four (4) times daily as needed for Diarrhea for up to 10 days. Quantity:  120 Cap Refills:  2  
     
   
   
   
  
 oxyCODONE IR 5 mg immediate release tablet Commonly known as:  Low Julio Your last dose was: Your next dose is:    
   
   
 Dose:  5 mg Take 1 Tab by mouth every four (4) hours as needed. Max Daily Amount: 30 mg.  
 Quantity:  60 Tab Refills:  0 CONTINUE these medications which have CHANGED Dose & Instructions Dispensing Information Comments Morning Noon Evening Bedtime  
 valsartan 160 mg tablet Commonly known as:  DIOVAN What changed:  how much to take Your last dose was: Your next dose is:    
   
   
 Dose:  160 mg Take 1 Tab by mouth daily for 30 days. Quantity:  30 Tab Refills:  1 CONTINUE these medications which have NOT CHANGED Dose & Instructions Dispensing Information Comments Morning Noon Evening Bedtime BENADRYL 25 mg capsule Generic drug:  diphenhydrAMINE Your last dose was: Your next dose is:    
   
   
 Dose:  25 mg Take 25 mg by mouth every six (6) hours as needed. Refills:  0  
     
   
   
   
  
 BIOTIN PO Your last dose was: Your next dose is: Take  by mouth daily. Refills:  0 ASK your doctor about these medications Dose & Instructions Dispensing Information Comments Morning Noon Evening Bedtime  
 ibuprofen 200 mg tablet Commonly known as:  MOTRIN Your last dose was: Your next dose is:    
   
   
 Dose:  200 mg Take 200 mg by mouth every eight (8) hours as needed for Pain. Refills:  0 Where to Get Your Medications Information on where to get these meds will be given to you by the nurse or doctor. ! Ask your nurse or doctor about these medications  
  loperamide 2 mg capsule  
 oxyCODONE IR 5 mg immediate release tablet  
 valsartan 160 mg tablet

## 2017-09-19 LAB
ANION GAP SERPL CALC-SCNC: 11 MMOL/L (ref 5–15)
BUN SERPL-MCNC: 12 MG/DL (ref 6–20)
BUN/CREAT SERPL: 16 (ref 12–20)
CALCIUM SERPL-MCNC: 8.5 MG/DL (ref 8.5–10.1)
CHLORIDE SERPL-SCNC: 105 MMOL/L (ref 97–108)
CO2 SERPL-SCNC: 21 MMOL/L (ref 21–32)
CREAT SERPL-MCNC: 0.77 MG/DL (ref 0.55–1.02)
ERYTHROCYTE [DISTWIDTH] IN BLOOD BY AUTOMATED COUNT: 13.4 % (ref 11.5–14.5)
GLUCOSE SERPL-MCNC: 146 MG/DL (ref 65–100)
HCT VFR BLD AUTO: 39.8 % (ref 35–47)
HGB BLD-MCNC: 13.9 G/DL (ref 11.5–16)
MAGNESIUM SERPL-MCNC: 2.1 MG/DL (ref 1.6–2.4)
MCH RBC QN AUTO: 30.2 PG (ref 26–34)
MCHC RBC AUTO-ENTMCNC: 34.9 G/DL (ref 30–36.5)
MCV RBC AUTO: 86.5 FL (ref 80–99)
PHOSPHATE SERPL-MCNC: 3.2 MG/DL (ref 2.6–4.7)
PLATELET # BLD AUTO: 306 K/UL (ref 150–400)
POTASSIUM SERPL-SCNC: 4.1 MMOL/L (ref 3.5–5.1)
RBC # BLD AUTO: 4.6 M/UL (ref 3.8–5.2)
SODIUM SERPL-SCNC: 137 MMOL/L (ref 136–145)
WBC # BLD AUTO: 12.1 K/UL (ref 3.6–11)

## 2017-09-19 PROCEDURE — 65270000029 HC RM PRIVATE

## 2017-09-19 PROCEDURE — 77030011641 HC PASTE OST ADH BMS -A

## 2017-09-19 PROCEDURE — 74011250636 HC RX REV CODE- 250/636: Performed by: COLON & RECTAL SURGERY

## 2017-09-19 PROCEDURE — 74011250637 HC RX REV CODE- 250/637: Performed by: COLON & RECTAL SURGERY

## 2017-09-19 PROCEDURE — 77030010540 HC BG OST DRN BMS -A

## 2017-09-19 PROCEDURE — 77030012890

## 2017-09-19 RX ORDER — SODIUM CHLORIDE 0.9 % (FLUSH) 0.9 %
SYRINGE (ML) INJECTION
Status: DISPENSED
Start: 2017-09-19 | End: 2017-09-20

## 2017-09-19 RX ADMIN — KETOROLAC TROMETHAMINE 15 MG: 30 INJECTION, SOLUTION INTRAMUSCULAR at 14:22

## 2017-09-19 RX ADMIN — SODIUM CHLORIDE 50 ML/HR: 900 INJECTION, SOLUTION INTRAVENOUS at 23:58

## 2017-09-19 RX ADMIN — KETOROLAC TROMETHAMINE 15 MG: 30 INJECTION, SOLUTION INTRAMUSCULAR at 06:00

## 2017-09-19 RX ADMIN — SODIUM CHLORIDE 50 ML/HR: 900 INJECTION, SOLUTION INTRAVENOUS at 05:41

## 2017-09-19 RX ADMIN — CELECOXIB 100 MG: 100 CAPSULE ORAL at 19:01

## 2017-09-19 RX ADMIN — ALVIMOPAN 12 MG: 12 CAPSULE ORAL at 19:21

## 2017-09-19 RX ADMIN — HYDROMORPHONE HYDROCHLORIDE 0.2 MG: 1 INJECTION, SOLUTION INTRAMUSCULAR; INTRAVENOUS; SUBCUTANEOUS at 08:42

## 2017-09-19 RX ADMIN — ENOXAPARIN SODIUM 40 MG: 40 INJECTION SUBCUTANEOUS at 14:25

## 2017-09-19 RX ADMIN — ALVIMOPAN 12 MG: 12 CAPSULE ORAL at 08:50

## 2017-09-19 RX ADMIN — HYDROMORPHONE HYDROCHLORIDE 0.2 MG: 1 INJECTION, SOLUTION INTRAMUSCULAR; INTRAVENOUS; SUBCUTANEOUS at 23:56

## 2017-09-19 RX ADMIN — HYDROMORPHONE HYDROCHLORIDE 0.2 MG: 1 INJECTION, SOLUTION INTRAMUSCULAR; INTRAVENOUS; SUBCUTANEOUS at 12:35

## 2017-09-19 RX ADMIN — OXYCODONE HYDROCHLORIDE AND ACETAMINOPHEN 1 TABLET: 5; 325 TABLET ORAL at 21:32

## 2017-09-19 RX ADMIN — HYDROMORPHONE HYDROCHLORIDE 0.2 MG: 1 INJECTION, SOLUTION INTRAMUSCULAR; INTRAVENOUS; SUBCUTANEOUS at 19:01

## 2017-09-19 RX ADMIN — OXYCODONE HYDROCHLORIDE 5 MG: 5 TABLET ORAL at 17:20

## 2017-09-19 RX ADMIN — LOSARTAN POTASSIUM 100 MG: 50 TABLET, FILM COATED ORAL at 08:49

## 2017-09-19 RX ADMIN — HYDROMORPHONE HYDROCHLORIDE 0.2 MG: 1 INJECTION, SOLUTION INTRAMUSCULAR; INTRAVENOUS; SUBCUTANEOUS at 15:36

## 2017-09-19 RX ADMIN — HYDROMORPHONE HYDROCHLORIDE 0.2 MG: 1 INJECTION, SOLUTION INTRAMUSCULAR; INTRAVENOUS; SUBCUTANEOUS at 03:28

## 2017-09-19 NOTE — PROGRESS NOTES
Reviewed medical chart; met with the patient at the bedside. Patient had a robotic colostomy takedown, lysis of adhesions, and diverting loop ileostomy at this visit. Patient has a colostomy in place, but explained that she now has a different type of bag than she did prior to admission. Her , who is retired, typically completes the bag changes as the patient describes herself as \"squimmish. \"  They will need education on the new bag prior to discharge, but the patient does not feel that they will need home health services. Patient lives with her  and their 32year old son, who has Asperger's Syndrome. They have two other sons as well. Patient does not yet have a PCP, as they recently moved to [address]. Patient is employed by the [employer] in [address] and will need a note for her employer at discharge. Care Management will continue to follow her disposition. JOSY Lord     Care Management Interventions  PCP Verified by CM:  Yes  Palliative Care Consult (Criteria: CHF and RRAT>21): No  Mode of Transport at Discharge:  (Patient will travel via car at discharge.  )  MyChart Signup: No  Discharge Durable Medical Equipment: No  Physical Therapy Consult: No  Occupational Therapy Consult: No  Speech Therapy Consult: No  Current Support Network: Lives with Spouse (Patient lives with her  and their 32year old son.  )  Confirm Follow Up Transport:  (Patient will travel via car at discharge.  )  Plan discussed with Pt/Family/Caregiver: Yes  Freedom of Choice Offered: Yes  Discharge Location  Discharge Placement: Home

## 2017-09-19 NOTE — PROGRESS NOTES
Bedside shift change report given to Demarcus Kilpatrick RN (oncoming nurse) by Suzanne Shah RN (offgoing nurse). Report included the following information SBAR, Intake/Output and MAR.

## 2017-09-19 NOTE — WOUND CARE
CWOCN Ostomy Progress Note:     First postoperative visit. Type of Ostomy: diverting loop ileostomy after colostomy takedown    Location: [facility]  Date of Surgery: 9/18/17      Surgeon: Dr. Penny Sawyer:  Apriloma Ponceshelly removed, stoma and peristomal skin cleaned and assessed, new pouch prepared and applied. Findings:  Current appliance: 1-piece flat activelife with Barbara's ring and paste. Stoma size: ~1.75\"  Stoma color: red & moist  Characteristics: round and well-budded with renato in place  Peristomal skin: scattered irritation protected with Marathon  Abdomen: soft  Output: brown liquid / positive flatus  Other: lap sites with dermabond intact; KATHARINE in RLQ    Teaching/Subjects covered today:  - General anatomy and expectations of output  - Normal & abnormal stoma characteristics  - Normal & abnormal peristomal characteristics & changes over time  - When/how to clean stoma & peristomal skin  - When/how to empty pouch  - Crusting technique  - When/how to change appliance  - Dietary guidelines- encouraged fluid intake  - Manipulated/practiced with clean pouch and clip  - Reviewed ADL's    Pt's  had been managing colostomy due to patient's gag reflex with ostomy care. But both were actively listening and learning. Recommendations:  1. Empty appliance when 1/3 full and PRN. Encourage patient/family to notify nurse and  assist w/ pouch emptying to promote self-care. Change appliance twice weekly and PRN for leaking ASAP. DO NOT REINFORCE LEAKS to avoid skin breakdown. Transition of Care:  Ostomy nurse to return and reinforce teaching. Discussed possible renato removal prior to discharge and will refit for different pouch with that change and give ordering info.      Nadeem Tay, RN, BSN, [facility] [place]  Certified Wound, Ostomy, Continence Nurse  office: [phone]  pager [phone] or [phone]

## 2017-09-19 NOTE — PROGRESS NOTES
Pain controlled. Visit Vitals    /88    Pulse 100    Temp 97.6 °F (36.4 °C)    Resp 18    Ht 5' 2\" (1.575 m)    Wt 79.4 kg (175 lb)    SpO2 97%    BMI 32.01 kg/m2       Date 09/18/17 0700 - 09/19/17 0659 09/19/17 0700 - 09/20/17 0659   Shift 8161-9358 6636-7640 24 Hour Total 1316-4950 4837-0360 24 Hour Total   I  N  T  A  K  E   I.V.  (mL/kg/hr) 2800  (2.9) 826.7  (0.9) 3626.7  (1.9)         I.V. 100  100         Volume (lactated Ringers infusion) 2700  2700         Volume (0.9% sodium chloride infusion)  826.7 826.7       Shift Total  (mL/kg) 2800  (35.3) 826.7  (10.4) 3626.7  (45.7)      O  U  T  P  U  T   Urine  (mL/kg/hr) 1475  (1.5) 1400  (1.5) 2875  (1.5)         Urine Output 600  600         Urine Output (mL) ([REMOVED] Urinary Catheter 09/18/17 2- way; [place]) 875 1400 2275       Drains 150 70 220         Output (ml) (Eduardo-Mccloud Drain 09/18/17 Right; Lower Abdomen) 150 70 220       Blood 25  25         Estimated Blood Loss 25  25       Shift Total  (mL/kg) 1650  (20.8) 1470  (18.5) 3120  (39.3)      NET 1150 -643.3 506.7      Weight (kg) 79.4 79.4 79.4 79.4 79.4 79.4     abd soft  Stoma with stool in bag    A/P advance diet  Wound care consult re stoma

## 2017-09-19 NOTE — PROGRESS NOTES
Bedside and Verbal shift change report given to Vanita Leggett RN (oncoming nurse) by Chaz Adhikari RN (offgoing nurse). Report included the following information SBAR, Kardex, Intake/Output, MAR, Accordion, Recent Results and Med Rec Status.

## 2017-09-20 PROCEDURE — 65270000029 HC RM PRIVATE

## 2017-09-20 PROCEDURE — 74011250636 HC RX REV CODE- 250/636: Performed by: COLON & RECTAL SURGERY

## 2017-09-20 PROCEDURE — 74011250637 HC RX REV CODE- 250/637: Performed by: COLON & RECTAL SURGERY

## 2017-09-20 RX ORDER — SODIUM CHLORIDE 0.9 % (FLUSH) 0.9 %
SYRINGE (ML) INJECTION
Status: COMPLETED
Start: 2017-09-20 | End: 2017-09-20

## 2017-09-20 RX ADMIN — OXYCODONE HYDROCHLORIDE AND ACETAMINOPHEN 1 TABLET: 5; 325 TABLET ORAL at 07:27

## 2017-09-20 RX ADMIN — OXYCODONE HYDROCHLORIDE AND ACETAMINOPHEN 1 TABLET: 5; 325 TABLET ORAL at 15:34

## 2017-09-20 RX ADMIN — LOSARTAN POTASSIUM 100 MG: 50 TABLET, FILM COATED ORAL at 09:44

## 2017-09-20 RX ADMIN — HYDROMORPHONE HYDROCHLORIDE 0.2 MG: 1 INJECTION, SOLUTION INTRAMUSCULAR; INTRAVENOUS; SUBCUTANEOUS at 12:58

## 2017-09-20 RX ADMIN — CELECOXIB 100 MG: 100 CAPSULE ORAL at 19:18

## 2017-09-20 RX ADMIN — HYDROMORPHONE HYDROCHLORIDE 0.2 MG: 1 INJECTION, SOLUTION INTRAMUSCULAR; INTRAVENOUS; SUBCUTANEOUS at 23:46

## 2017-09-20 RX ADMIN — OXYCODONE HYDROCHLORIDE AND ACETAMINOPHEN 1 TABLET: 5; 325 TABLET ORAL at 11:28

## 2017-09-20 RX ADMIN — OXYCODONE HYDROCHLORIDE AND ACETAMINOPHEN 1 TABLET: 5; 325 TABLET ORAL at 21:20

## 2017-09-20 RX ADMIN — Medication 10 ML: at 12:58

## 2017-09-20 RX ADMIN — HYDROMORPHONE HYDROCHLORIDE 0.2 MG: 1 INJECTION, SOLUTION INTRAMUSCULAR; INTRAVENOUS; SUBCUTANEOUS at 02:50

## 2017-09-20 RX ADMIN — CELECOXIB 100 MG: 100 CAPSULE ORAL at 09:44

## 2017-09-20 RX ADMIN — HYDROMORPHONE HYDROCHLORIDE 0.2 MG: 1 INJECTION, SOLUTION INTRAMUSCULAR; INTRAVENOUS; SUBCUTANEOUS at 06:00

## 2017-09-20 RX ADMIN — HYDROMORPHONE HYDROCHLORIDE 0.2 MG: 1 INJECTION, SOLUTION INTRAMUSCULAR; INTRAVENOUS; SUBCUTANEOUS at 09:40

## 2017-09-20 RX ADMIN — ENOXAPARIN SODIUM 40 MG: 40 INJECTION SUBCUTANEOUS at 15:30

## 2017-09-20 RX ADMIN — HYDROMORPHONE HYDROCHLORIDE 0.2 MG: 1 INJECTION, SOLUTION INTRAMUSCULAR; INTRAVENOUS; SUBCUTANEOUS at 17:11

## 2017-09-20 RX ADMIN — OXYCODONE HYDROCHLORIDE AND ACETAMINOPHEN 1 TABLET: 5; 325 TABLET ORAL at 02:00

## 2017-09-20 RX ADMIN — HYDROMORPHONE HYDROCHLORIDE 0.2 MG: 1 INJECTION, SOLUTION INTRAMUSCULAR; INTRAVENOUS; SUBCUTANEOUS at 20:26

## 2017-09-20 NOTE — PROGRESS NOTES
Bedside and Verbal shift change report given to Anjali DELGADO(oncoming nurse) by Philippe Vernon RN and [facility] RN (offgoing nurse). Report included the following information SBAR, Kardex, OR Summary, Procedure Summary, Intake/Output, MAR, Accordion and Recent Results.

## 2017-09-20 NOTE — PROGRESS NOTES
Bedside shift change report given to [address] (oncoming nurse) by Warren Johnson (offgoing nurse). Report included the following information SBAR.

## 2017-09-20 NOTE — PROGRESS NOTES
Doing well  Visit Vitals    /89 (BP 1 Location: Left arm, BP Patient Position: At rest)    Pulse 94    Temp 97.9 °F (36.6 °C)    Resp 18    Ht 5' 2\" (1.575 m)    Wt 94.1 kg (207 lb 7.3 oz)    SpO2 94%    BMI 37.94 kg/m2       Date 09/19/17 0700 - 09/20/17 0659 09/20/17 0700 - 09/21/17 0659   Shift 0700-1859 1900-0659 24 Hour Total 9535-1887 3994-8785 24 Hour Total   I  N  T  A  K  E   P.O. 200 250 450         P. O. 200 250 450       I.V.  (mL/kg/hr)  857.5  (0.8) 857.5  (0.4)         Volume (0.9% sodium chloride infusion)  857.5 857.5       Shift Total  (mL/kg) 200  (2.1) 1107.5  (11.8) 1307.5  (13.9)      O  U  T  P  U  T   Urine  (mL/kg/hr) 800  (0.7) 300  (0.3) 1100  (0.5) 1100  1100      Urine Voided  1100  1100    Drains 80  80 50  50      Output (ml) (Eduardo-Mccloud Drain 09/18/17 Right; Lower Abdomen) 80  80 50  50    Stool  500  500      Output (ml) (Colostomy 09/18/17 Left; Lower  Abdomen)  500  500    Shift Total  (mL/kg) 1480  (15.8) 750  (8) 2230  (23.7) 1650  (17.5)  1650  (17.5)   NET -1280 357.5 -922.5 -1650  -1650   Weight (kg) 93.9 94.1 94.1 94.1 94.1 94.1     abd soft    A/p dc stoma renato tomorrow  Dc danna tomorrow  Continue present management

## 2017-09-20 NOTE — DISCHARGE INSTRUCTIONS
June Boyle MD, FACS  Dennis NICOLE. Tara Morris MD, FACS  Malvin Franco. Aydin Osei MD, [address] Kecia Gurrola MD, [address] [facility] Chino Fam MD, FACS  Kourtney Hunt. MD Ziyad Collado MD    [facility] Discharge Instructions for Colon Surgery Patients    1. Diagnosis: colostomy takedown  2. Restricted fiber diet. 3. Do not drive for 2 weeks or until after your next doctors appointment. 4. Leave steri-strips on incision. They may fall off on their own. 5. May take a shower. 6. No lifting any objects weighing more than 10 pounds. Do not do any housework, such as vacuuming, scrubbing, etc for at least a month. 7. When you get tired during the day, take naps, as you need your rest.  8. Multiple bowel movements are normal each day for a while. 9. May walk as desired. May go up and down stairs. 10. Take pain medication as prescribed: (NO DRIVING WHILE ON PAIN MEDICATIONS). Tylenol, motrin, or percocet EVERY 4-6 HOURS AS NEEDED. Other Medications: see med rec  Ostomy Supplies: provided by home health  11. See me in the office in 10-14 days. Call as soon as discharged for an appointment 21 [phone]. IF SURGERY INVOLVED AN OSTOMY BAG, PLEASE BRING YOUR SUPPLIES TO YOUR 1ST VISIT! 12.  Call the Exchange [phone], if you have any questions or problems after office hours.

## 2017-09-20 NOTE — PROGRESS NOTES
Spiritual Care Partner Volunteer visited patient in [facility] on 9/20/17. Documented by:  Rev. Demian Glez M.Div, [facility]

## 2017-09-21 VITALS
TEMPERATURE: 97.8 F | WEIGHT: 207.45 LBS | HEIGHT: 62 IN | DIASTOLIC BLOOD PRESSURE: 98 MMHG | OXYGEN SATURATION: 96 % | RESPIRATION RATE: 18 BRPM | SYSTOLIC BLOOD PRESSURE: 176 MMHG | HEART RATE: 80 BPM | BODY MASS INDEX: 38.18 KG/M2

## 2017-09-21 PROCEDURE — 77030010520

## 2017-09-21 PROCEDURE — 74011250636 HC RX REV CODE- 250/636: Performed by: COLON & RECTAL SURGERY

## 2017-09-21 PROCEDURE — 74011250637 HC RX REV CODE- 250/637: Performed by: COLON & RECTAL SURGERY

## 2017-09-21 RX ORDER — OXYCODONE HYDROCHLORIDE 5 MG/1
5 TABLET ORAL
Qty: 60 TAB | Refills: 0 | Status: SHIPPED | OUTPATIENT
Start: 2017-09-21 | End: 2017-11-13

## 2017-09-21 RX ORDER — LOPERAMIDE HYDROCHLORIDE 2 MG/1
2 CAPSULE ORAL
Qty: 120 CAP | Refills: 2 | Status: SHIPPED | OUTPATIENT
Start: 2017-09-21 | End: 2017-10-01

## 2017-09-21 RX ORDER — VALSARTAN 160 MG/1
160 TABLET ORAL DAILY
Qty: 30 TAB | Refills: 1 | Status: SHIPPED | OUTPATIENT
Start: 2017-09-21 | End: 2017-10-21

## 2017-09-21 RX ADMIN — OXYCODONE HYDROCHLORIDE AND ACETAMINOPHEN 1 TABLET: 5; 325 TABLET ORAL at 06:35

## 2017-09-21 RX ADMIN — HYDROMORPHONE HYDROCHLORIDE 0.2 MG: 1 INJECTION, SOLUTION INTRAMUSCULAR; INTRAVENOUS; SUBCUTANEOUS at 04:20

## 2017-09-21 RX ADMIN — CELECOXIB 100 MG: 100 CAPSULE ORAL at 08:54

## 2017-09-21 RX ADMIN — LOSARTAN POTASSIUM 100 MG: 50 TABLET, FILM COATED ORAL at 08:54

## 2017-09-21 RX ADMIN — OXYCODONE HYDROCHLORIDE AND ACETAMINOPHEN 1 TABLET: 5; 325 TABLET ORAL at 01:20

## 2017-09-21 RX ADMIN — OXYCODONE HYDROCHLORIDE AND ACETAMINOPHEN 1 TABLET: 5; 325 TABLET ORAL at 10:51

## 2017-09-21 RX ADMIN — HYDROMORPHONE HYDROCHLORIDE 0.2 MG: 1 INJECTION, SOLUTION INTRAMUSCULAR; INTRAVENOUS; SUBCUTANEOUS at 08:54

## 2017-09-21 NOTE — PROGRESS NOTES
Discussed discharge instructions and prescriptions information with patient. Provided opportunity for questions. Peripheral IV and KATHARINE drain removed without complication. Pt belongings packed and sent with pt. Pt transported via wheelchair by volunteers to discharge lot. Pt stable and in no acute distress at time of discharge.

## 2017-09-21 NOTE — PROGRESS NOTES
Bedside and Verbal shift change report given to [address] (oncoming nurse) by Chaz Adhikari RN (offgoing nurse). Report included the following information SBAR, Kardex, Intake/Output, MAR, Accordion, Recent Results and Med Rec Status.

## 2017-09-21 NOTE — DISCHARGE SUMMARY
Physician Discharge Summary     Patient ID:  Pippa Irving  [id number]  46 y.o.  1965    Admit Date: 9/18/2017    Discharge Date: 9/21/2017    * Admission Diagnoses: HX OF PERFORATED DIVERTICULITIS, S/P SIGMOID COLECTOMY WITH END ILEOSTOMY  Colostomy in place [facility])    * Discharge Diagnoses:    Hospital Problems as of 9/21/2017  Date Reviewed: 5/15/2017          Codes Class Noted - Resolved POA    Colostomy in place [facility]) ICD-10-CM: Z93.3  ICD-9-CM: V44.3  9/18/2017 - Present Unknown               Admission Condition: Good    * Discharge Condition: good    * Procedures: Procedure(s):  ROBOTIC COLOSTOMY TAKEDOWN, LYSIS OF ADHESIONS, diverting loop ileostomy    * Hospital Course:   Normal hospital course for this procedure. Consults: None        * Disposition: home health    Discharge Medications:   Current Discharge Medication List      START taking these medications    Details   oxyCODONE IR (ROXICODONE) 5 mg immediate release tablet Take 1 Tab by mouth every four (4) hours as needed. Max Daily Amount: 30 mg.  Qty: 60 Tab, Refills: 0         CONTINUE these medications which have CHANGED    Details   valsartan (DIOVAN) 160 mg tablet Take 1 Tab by mouth daily for 30 days. Qty: 30 Tab, Refills: 1         CONTINUE these medications which have NOT CHANGED    Details   BIOTIN PO Take  by mouth daily. diphenhydrAMINE (BENADRYL) 25 mg capsule Take 25 mg by mouth every six (6) hours as needed. * Follow-up Care/Patient Instructions: Activity: No lifting, Driving, or Strenuous exercise for 4 weeks  Diet: Regular Diet  Wound Care: Keep wound clean and dry    Follow-up Information     Follow up With Details Comments Contact Info    None   None ([phone]) Patient stated that they have no PCP              Signed:   Horacio Armenta MD  9/21/2017  7:03 AM

## 2017-09-21 NOTE — PROGRESS NOTES
Reviewed medical chart; met with the patient and her  at the bedside. Patient will return home with her family today. Provided a list of primary care physicians through [facility] in [place], [place]. Patient chose Dr. Joelle Sánchze, RAMONA#[phone] and this  called to obtain a new patient appointment. That MD is not taking any new patients and the other physicians in the group do not have any availability until December. Patient opted to choose another MD once she returns home. Scheduled an appointment with Dr. Daniella Mccoy for the patient for 9/28/17 at 1:30PM.  Patient's  will drive her home. No other discharge needs identified at this time.    Haylee Wehatley, JOSY

## 2017-09-21 NOTE — PROGRESS NOTES
Problem: Patient Education: Go to Patient Education Activity  Goal: Patient/Family Education  NUTRITION      Completed an Ileostomy diet instruction with patient and spouse. Patient with very good understanding of dietary needs. Provided informational handouts for patient to take home and answered all questions. Will gladly follow up further if any other dietary needs identified.         DAWIT MarianoR

## 2017-09-21 NOTE — WOUND CARE
CWOCN Ostomy Progress Note:     Second and final postoperative visit - for discharge today. Type of Ostomy: diverting loop ileostomy after colostomy takedown    Location: [facility]  Date of Surgery: 9/18/17      Surgeon: Dr. Todd Kuhn     Treatments:  Sofie Geovanna removed, stoma and peristomal skin cleaned and assessed, new pouch prepared and applied.     Findings:  Current appliance: 1-piece flat activelife with Barbara's ring and paste. Stoma size: ~1.75\"  Stoma color: red & moist  Characteristics: round and well-budded with renato removed today per MD request.  Peristomal skin: scattered irritation protected with [place] - improved from 2 days ago  Abdomen: soft  Output: mushy brown / positive flatus  Other: lap sites with dermabond intact; KATHARINE in [facility]    Teaching/Subjects covered today:    - Dietary guidelines - encouraged hydration  - Where/how to obtain supplies  - Reviewed ADL's  - Blockage s/s and electrolyte imbalance  - Pharmacy notification re: meds     present, eager, and knowledgeable re: ostomy management. Recommendations:  1. Empty appliance when 1/3 full and PRN. Encourage patient/family to notify nurse and  assist w/ pouch emptying to promote self-care. Change appliance twice weekly and PRN for leaking ASAP. DO NOT REINFORCE LEAKS to avoid skin breakdown. Transition of Care:  Supplies given to patient with ordering information.     Johanna Hernandez, RN, BSN, [facility] [place]  Certified Wound, Ostomy, Continence Nurse  office: [phone]  pager [phone] or [phone]

## 2017-10-19 ENCOUNTER — HOSPITAL ENCOUNTER (EMERGENCY)
Age: 52
Discharge: HOME | End: 2017-10-20
Attending: EMERGENCY MEDICINE
Payer: COMMERCIAL

## 2017-10-19 VITALS
WEIGHT: 208.11 LBS | RESPIRATION RATE: 18 BRPM | HEIGHT: 62 IN | DIASTOLIC BLOOD PRESSURE: 94 MMHG | HEART RATE: 92 BPM | OXYGEN SATURATION: 98 % | TEMPERATURE: 97.7 F | SYSTOLIC BLOOD PRESSURE: 169 MMHG | BODY MASS INDEX: 38.3 KG/M2

## 2017-10-19 DIAGNOSIS — R23.8 SKIN BREAKDOWN: Primary | ICD-10-CM

## 2017-10-19 PROCEDURE — 99283 EMERGENCY DEPT VISIT LOW MDM: CPT

## 2017-10-19 RX ORDER — OXYCODONE AND ACETAMINOPHEN 5; 325 MG/1; MG/1
2 TABLET ORAL ONCE
Status: COMPLETED | OUTPATIENT
Start: 2017-10-20 | End: 2017-10-20

## 2017-10-20 VITALS
BODY MASS INDEX: 38.26 KG/M2 | HEART RATE: 85 BPM | TEMPERATURE: 98.2 F | RESPIRATION RATE: 18 BRPM | WEIGHT: 207.89 LBS | OXYGEN SATURATION: 98 % | HEIGHT: 62 IN | DIASTOLIC BLOOD PRESSURE: 127 MMHG | SYSTOLIC BLOOD PRESSURE: 147 MMHG

## 2017-10-20 DIAGNOSIS — Z71.89 ENCOUNTER FOR OSTOMY CARE EDUCATION: Primary | ICD-10-CM

## 2017-10-20 DIAGNOSIS — R23.8 SKIN BREAKDOWN: ICD-10-CM

## 2017-10-20 PROCEDURE — 99283 EMERGENCY DEPT VISIT LOW MDM: CPT

## 2017-10-20 PROCEDURE — 74011250637 HC RX REV CODE- 250/637: Performed by: EMERGENCY MEDICINE

## 2017-10-20 PROCEDURE — 77030018836 HC SOL IRR NACL ICUM -A

## 2017-10-20 PROCEDURE — 77030010541

## 2017-10-20 PROCEDURE — 77030011641 HC PASTE OST ADH BMS -A

## 2017-10-20 PROCEDURE — 99282 EMERGENCY DEPT VISIT SF MDM: CPT

## 2017-10-20 PROCEDURE — 77030010520

## 2017-10-20 RX ORDER — ZINC OXIDE 20 G/100G
OINTMENT TOPICAL AS NEEDED
Status: DISCONTINUED | OUTPATIENT
Start: 2017-10-20 | End: 2017-10-20 | Stop reason: HOSPADM

## 2017-10-20 RX ADMIN — OXYCODONE HYDROCHLORIDE AND ACETAMINOPHEN 2 TABLET: 5; 325 TABLET ORAL at 00:10

## 2017-10-20 NOTE — ED NOTES
Bedside shift change report given to Alfa Nguyen (oncoming nurse) by Reena Leal (offgoing nurse). Report included the following information SBAR, Kardex, ED Summary and MAR.

## 2017-10-20 NOTE — ED PROVIDER NOTES
[facility] [address]  EMERGENCY DEPARTMENT HISTORY AND PHYSICAL EXAM       Date of Service: 10/19/2017   Patient Name: Ana Hartley   YOB: 1965  Medical Record Number: [id number]    History of Presenting Illness     Chief Complaint   Patient presents with    Other     Had colostomy placed 3 weeks ago. Usually has help at home with changing bag from , whom is not at home for family emergency. Had been having difficulty with colostomy draiing and placing bag appropriately. HAs had some scant bleeding and a 4/10 burning at this time. History Provided By:  patient    Additional History:   Ana Hartley is a 46 y.o. female with PMhx significant for perforated diverticulitis awaiting repair/reanastomosis 11/2017, presenting to the ER for stool leaking from ostomy. Patient explains she has skin wounds from the ostomy and she is having trouble getting a bag to stick to her skin. She has tried to place 8 today and continues to keep cutting them bigger in attempts to get them to stick. Normally her  takes care of her bag replacement but he is out of town. She denies any fevers, chills, vomiting, diarrhea and abdominal pain. She is urinating normally. Of note, patient is here visiting as she lives out of town. Social Hx: (-) tobacco (former), (+) EtOH,  (+) Illicit Drugs (marijuna)    There are no other complaints, changes or physical findings at this time.     Primary Care Provider: None       Past History     Past Medical History:   Past Medical History:   Diagnosis Date    Hypertension     Morbid obesity ([address]     Sigmoid diverticulitis     Tobacco use         Past Surgical History:   Past Surgical History:   Procedure Laterality Date    HX GI  05/2017    COLOSTOMY-PERFORATED BOWEL D/T DIVERTICULITIS    HX GYN      ectopic pregnancy    HX GYN      BTL        Family History:   Family History   Problem Relation Age of Onset    Cancer Mother      UTERINE    Cancer Father      COLON    Cancer Sister      SKIN-UNKNOWN TYPE    Anesth Problems Neg Hx         Social History:   Social History   Substance Use Topics    Smoking status: Former Smoker     Packs/day: 1.50     Types: Cigarettes     Quit date: 5/15/2017    Smokeless tobacco: Never Used    Alcohol use 0.0 oz/week     0 Standard drinks or equivalent per week      Comment: socially        Allergies: Allergies   Allergen Reactions    Lisinopril Cough         Review of Systems   Review of Systems   Constitutional: Negative for activity change, appetite change, chills, fever and unexpected weight change. HENT: Negative for congestion. Eyes: Negative for pain and visual disturbance. Respiratory: Negative for cough and shortness of breath. Cardiovascular: Negative for chest pain. Gastrointestinal: Positive for abdominal pain (burning). Negative for diarrhea, nausea and vomiting. Genitourinary: Negative for dysuria. Musculoskeletal: Negative for back pain. Skin: Positive for rash and wound. Neurological: Negative for headaches. Physical Exam  Physical Exam   Constitutional: She is oriented to person, place, and time. She appears well-developed and well-nourished. HENT:   Head: Normocephalic and atraumatic. Mouth/Throat: Oropharynx is clear and moist.   Eyes: Conjunctivae and EOM are normal. Pupils are equal, round, and reactive to light. Right eye exhibits no discharge. Left eye exhibits no discharge. Neck: Normal range of motion. Neck supple. Cardiovascular: Normal rate, regular rhythm and normal heart sounds. No murmur heard. Pulmonary/Chest: Effort normal and breath sounds normal. No respiratory distress. She has no wheezes. She has no rales. Abdominal: Soft. Bowel sounds are normal. She exhibits no distension. There is tenderness. There is no rebound and no guarding.    Left mid abdominal ileostomy wound/ Marge-ostomy skin with extensive skin breakdown and irritation in a 5cm circumference around the wound. Ostomy notably irritated with ara BRB upon wiping friable tissue. Musculoskeletal: Normal range of motion. She exhibits no edema. Neurological: She is alert and oriented to person, place, and time. No cranial nerve deficit. She exhibits normal muscle tone. Skin: Skin is warm and dry. Rash noted. She is not diaphoretic. Psychiatric: Her mood appears anxious. Nursing note and vitals reviewed. Medical Decision Making   I am the first provider for this patient. I reviewed the vital signs, available nursing notes, past medical history, past surgical history, family history and social history. Provider Notes:   Patient without evidence of abdominal infection. Skin breakdown from stool exposure as it appears the patient is cutting addy holes out of her ostomy bag. Wound care with outpt management as patient desires to go home this AM.     ED Course:  23:30 PM  Initial assessment performed. The patients presenting problems have been discussed, and they are in agreement with the care plan formulated and outlined with them. I have encouraged them to ask questions as they arise throughout their visit. Progress Notes:   02:30 Patient appears to have improved mildly; bearier cream applied. Discussed possibilities to get through today. Replaced bag. Vital Signs-Reviewed the patient's vital signs. Patient Vitals for the past 12 hrs:   Temp Pulse Resp BP SpO2   10/19/17 2329 97.7 °F (36.5 °C) 92 18 (!) 169/94 98 %       Medications Given in the ED:  Medications   zinc oxide 20 % ointment (not administered)   oxyCODONE-acetaminophen (PERCOCET) 5-325 mg per tablet 2 Tab (2 Tabs Oral Given 10/20/17 0010)     Diagnosis   Clinical Impression:   1.  Skin breakdown         Plan:  1:   Follow-up Information     Follow up With Details Comments Contact Info    [facility] EMERGENCY DEPT  If symptoms worsen [address] [address]  [phone]        2:   Current Discharge Medication List        Return to ED if Worse    Disposition Note:  1:53 AM  Patient's results have been reviewed with them. Patient and/or family have verbally conveyed their understanding and agreement of the patient's signs, symptoms, diagnosis, treatment and prognosis and additionally agree to follow up as recommended or return to the Emergency Room should their condition change prior to follow-up. Discharge instructions have also been provided to the patient with some educational information regarding their diagnosis as well a list of reasons why they would want to return to the ER prior to their follow-up appointment should their condition change.     _______________________________   Attestations: This note is prepared by Mike Lynn, acting as Scribe for Lola Selby MD.    Lola Selby MD: The scribe's documentation has been prepared under my direction and personally reviewed by me in its entirety.  I confirm that the note above accurately reflects all work, treatment, procedures, and medical decision making performed by me.   _______________________________

## 2017-10-20 NOTE — WOUND CARE
Wound / Ostomy Nurse: Consulted to see this patient for severely excoriated skin with skin crater in the peristomal skin in three places. The skin is bright red and burnt from the high acidity drainage on the skin. The stoma also has some separation on the inferior side but this is currently stable as long as she continues to protect the skin / wound with an ostomy ring. Treatment: The skin was treated with [place] around the immediate peristomal skin and then crusting was done with stoma powder and protective wipes x three layers. The ileostomy bag was applied and held in place for 5 minutes. Patient consistently resists learning the ostomy care and the nurses at Dr. Rossi Roles office did not know how to treat her skin or change the ostomy pouch yesterday. Pt.'s  is out of town for a  and this is the first time patient has been left alone to care for her stoma. Patient discharged home with one extra bag / pouch and supplies to change it as needed today. She has some marathon skin protectant at home.    Susana Castellon, RN, BSN, [employer]

## 2017-10-20 NOTE — ED PROVIDER NOTES
[facility] [address]  EMERGENCY DEPARTMENT HISTORY AND PHYSICAL EXAM       Date of Service: 10/20/2017   Patient Name: Jyothi Mendez   YOB: 1965  Medical Record Number: [id number]    History of Presenting Illness     Chief Complaint   Patient presents with    Other     Was on her way home when she started to have leaking from her colostomy. Denying any pain        History Provided By:  patient    Additional History:   Jyothi Mendez is a 46 y.o. female with PMhx significant for HTN, sigmoid diverticulitis s/p colostomy and ileostomy who presents ambulatory to the ED with cc of burning pain surrounding her colostomy x today. She was seen earlier for same. Skin barrier cream and a new bag were applied but after being on the road 10 minutes, she notes the bag began to leak. Pt has taken ibuprofen at home for pain at the site of the skin with no relief. Pt denies fever, chills, nausea, vomiting, diarrhea. Social Hx: (-) tobacco (former), (+) EtOH (socially),  (+) Illicit Drugs (Marijuana)    There are no other complaints, changes or physical findings at this time.     Primary Care Provider: None   Specialist: Colorectal: Kirit Villasenor MD    Past History     Past Medical History:   Past Medical History:   Diagnosis Date    Hypertension     Morbid obesity ([facility] [address]     Sigmoid diverticulitis     Tobacco use         Past Surgical History:   Past Surgical History:   Procedure Laterality Date    HX GI  05/2017    COLOSTOMY-PERFORATED BOWEL D/T DIVERTICULITIS    HX GYN      ectopic pregnancy    HX GYN      BTL        Family History:   Family History   Problem Relation Age of Onset    Cancer Mother      UTERINE    Cancer Father      COLON    Cancer Sister      SKIN-UNKNOWN TYPE    Anesth Problems Neg Hx         Social History:   Social History   Substance Use Topics    Smoking status: Former Smoker     Packs/day: 1.50     Types: Cigarettes     Quit date: 5/15/2017    Smokeless tobacco: Never Used    Alcohol use 0.0 oz/week     0 Standard drinks or equivalent per week      Comment: socially        Allergies: Allergies   Allergen Reactions    Lisinopril Cough         Review of Systems   Review of Systems   Constitutional: Negative for activity change, appetite change, chills, fever and unexpected weight change. HENT: Negative for congestion. Eyes: Negative for pain and visual disturbance. Respiratory: Negative for cough and shortness of breath. Cardiovascular: Negative for chest pain. Gastrointestinal: Negative for abdominal pain, diarrhea, nausea and vomiting. Genitourinary: Negative for dysuria. Musculoskeletal: Negative for back pain. Skin: Positive for color change. Negative for rash. Neurological: Negative for headaches. Physical Exam  Physical Exam   Constitutional: She is oriented to person, place, and time. She appears well-developed and well-nourished. HENT:   Head: Normocephalic and atraumatic. Mouth/Throat: Oropharynx is clear and moist.   Eyes: Conjunctivae and EOM are normal. Pupils are equal, round, and reactive to light. Right eye exhibits no discharge. Left eye exhibits no discharge. Neck: Normal range of motion. Neck supple. Cardiovascular: Normal rate, regular rhythm and normal heart sounds. No murmur heard. Pulmonary/Chest: Effort normal and breath sounds normal. No respiratory distress. She has no wheezes. She has no rales. Abdominal: Soft. Bowel sounds are normal. She exhibits no distension. There is no tenderness. Genitourinary:   Genitourinary Comments: Left mid abdominal ileostomy with slight herniation  Skin breakdown with surrounding erythema and bleeding at ostomy site. Superior margin of the jannet-ostomy skin appears improved and less erythematous    Musculoskeletal: Normal range of motion. She exhibits no edema. Neurological: She is alert and oriented to person, place, and time.  No cranial nerve deficit. She exhibits normal muscle tone. Skin: Skin is warm and dry. No rash noted. She is not diaphoretic. Nursing note and vitals reviewed. Medical Decision Making   I am the first provider for this patient. I reviewed the vital signs, available nursing notes, past medical history, past surgical history, family history and social history. Old Medical Records: Old medical records. Nursing notes. Provider Notes:   Superior margin of the jannet-ostomy skin appears improved from earlier. Continue open to air with zinc and await AM wound care RN for further supply options. ED Course:  2:50 AM  Initial assessment performed. The patients presenting problems have been discussed, and they are in agreement with the care plan formulated and outlined with them. I have encouraged them to ask questions as they arise throughout their visit. Progress Notes:     SIGN OUT:  6:00 AM  Patient's presentation, labs/imaging and plan of care was reviewed with Maricarmen Curtis DO as part of sign out. They will await wound care consultation as part of the plan discussed with the patient. Maricarmen Curtis DO's assistance in completion of this plan is greatly appreciated but it should be noted that I will be the provider of record for this patient. Attestation: This note is prepared by Neli Stallings, acting as Scribe for Kymberly Martins MD.    Kymberly Martins MD: The scribe's documentation has been prepared under my direction and personally reviewed by me in its entirety. I confirm that the note above accurately reflects all work, treatment, procedures, and medical decision making performed by me. Progress Note:  9:05 AM  Wound care has evaluated the patient and replaced her ostomy bag. Pt has no new complaints at this time. Will discharge. Written by Demetris Butcher ED Scribe, as dictated by Maricarmen Curtis DO. Vital Signs-Reviewed the patient's vital signs.    Patient Vitals for the past 12 hrs:   Temp Pulse Resp BP SpO2   10/20/17 0247 98.2 °F (36.8 °C) 85 18 (!) 147/127 98 %       Medications Given in the ED:  Medications - No data to display    Diagnosis   Clinical Impression:   1. Encounter for ostomy care education    2. Skin breakdown        Plan:  1:   Follow-up Information     Follow up With Details Comments Contact Info    None   None (395) Patient stated that they have no PCP          2:   Current Discharge Medication List        Return to ED if Worse    Disposition Note:  9:06 AM  Patient's results have been reviewed with them. Patient and/or family have verbally conveyed their understanding and agreement of the patient's signs, symptoms, diagnosis, treatment and prognosis and additionally agree to follow up as recommended or return to the Emergency Room should their condition change prior to follow-up. Discharge instructions have also been provided to the patient with some educational information regarding their diagnosis as well a list of reasons why they would want to return to the ER prior to their follow-up appointment should their condition change.   _______________________________   Attestations: This note is prepared by Lizzeth Monahan, acting as Scribe for Dominga Alegre MD.    Dominga Alegre MD: The scribe's documentation has been prepared under my direction and personally reviewed by me in its entirety.  I confirm that the note above accurately reflects all work, treatment, procedures, and medical decision making performed by me.   _______________________________

## 2017-10-25 ENCOUNTER — HOSPITAL ENCOUNTER (OUTPATIENT)
Dept: GENERAL RADIOLOGY | Age: 52
Discharge: HOME | End: 2017-10-25
Attending: COLON & RECTAL SURGERY
Payer: COMMERCIAL

## 2017-10-25 DIAGNOSIS — K57.92 DIVERTICULITIS: ICD-10-CM

## 2017-10-25 PROCEDURE — 74011636320 HC RX REV CODE- 636/320: Performed by: RADIOLOGY

## 2017-10-25 PROCEDURE — 74270 X-RAY XM COLON 1CNTRST STD: CPT

## 2017-10-25 RX ADMIN — IOTHALAMATE MEGLUMINE 1000 ML: 172 INJECTION URETERAL at 09:00

## 2017-10-25 RX ADMIN — DIATRIZOATE MEGLUMINE AND DIATRIZOATE SODIUM 600 ML: 600; 100 SOLUTION ORAL; RECTAL at 09:00

## 2017-11-13 ENCOUNTER — HOSPITAL ENCOUNTER (OUTPATIENT)
Dept: PREADMISSION TESTING | Age: 52
Discharge: HOME | End: 2017-11-13
Payer: COMMERCIAL

## 2017-11-13 ENCOUNTER — HOSPITAL ENCOUNTER (OUTPATIENT)
Dept: GENERAL RADIOLOGY | Age: 52
Discharge: HOME | End: 2017-11-13
Payer: COMMERCIAL

## 2017-11-13 VITALS
WEIGHT: 210 LBS | HEIGHT: 62 IN | BODY MASS INDEX: 38.64 KG/M2 | HEART RATE: 80 BPM | SYSTOLIC BLOOD PRESSURE: 142 MMHG | DIASTOLIC BLOOD PRESSURE: 90 MMHG | TEMPERATURE: 97.5 F

## 2017-11-13 LAB
ALBUMIN SERPL-MCNC: 3.7 G/DL (ref 3.5–5)
ALBUMIN/GLOB SERPL: 1 {RATIO} (ref 1.1–2.2)
ALP SERPL-CCNC: 78 U/L (ref 45–117)
ALT SERPL-CCNC: 33 U/L (ref 12–78)
ANION GAP SERPL CALC-SCNC: 7 MMOL/L (ref 5–15)
APTT PPP: 27.8 SEC (ref 22.1–32.5)
AST SERPL-CCNC: 16 U/L (ref 15–37)
ATRIAL RATE: 79 BPM
BASOPHILS # BLD: 0 K/UL (ref 0–0.1)
BASOPHILS NFR BLD: 0 % (ref 0–1)
BILIRUB SERPL-MCNC: 0.3 MG/DL (ref 0.2–1)
BUN SERPL-MCNC: 24 MG/DL (ref 6–20)
BUN/CREAT SERPL: 27 (ref 12–20)
CALCIUM SERPL-MCNC: 8.6 MG/DL (ref 8.5–10.1)
CALCULATED P AXIS, ECG09: 36 DEGREES
CALCULATED R AXIS, ECG10: 67 DEGREES
CALCULATED T AXIS, ECG11: 78 DEGREES
CHLORIDE SERPL-SCNC: 107 MMOL/L (ref 97–108)
CO2 SERPL-SCNC: 26 MMOL/L (ref 21–32)
CREAT SERPL-MCNC: 0.89 MG/DL (ref 0.55–1.02)
DIAGNOSIS, 93000: NORMAL
EOSINOPHIL # BLD: 0.2 K/UL (ref 0–0.4)
EOSINOPHIL NFR BLD: 4 % (ref 0–7)
ERYTHROCYTE [DISTWIDTH] IN BLOOD BY AUTOMATED COUNT: 14.2 % (ref 11.5–14.5)
GLOBULIN SER CALC-MCNC: 3.8 G/DL (ref 2–4)
GLUCOSE SERPL-MCNC: 133 MG/DL (ref 65–100)
HCT VFR BLD AUTO: 38.6 % (ref 35–47)
HGB BLD-MCNC: 13.1 G/DL (ref 11.5–16)
INR PPP: 0.9 (ref 0.9–1.1)
LYMPHOCYTES # BLD: 1.9 K/UL (ref 0.8–3.5)
LYMPHOCYTES NFR BLD: 28 % (ref 12–49)
MCH RBC QN AUTO: 30 PG (ref 26–34)
MCHC RBC AUTO-ENTMCNC: 33.9 G/DL (ref 30–36.5)
MCV RBC AUTO: 88.5 FL (ref 80–99)
MONOCYTES # BLD: 0.7 K/UL (ref 0–1)
MONOCYTES NFR BLD: 11 % (ref 5–13)
NEUTS SEG # BLD: 3.8 K/UL (ref 1.8–8)
NEUTS SEG NFR BLD: 57 % (ref 32–75)
P-R INTERVAL, ECG05: 132 MS
PLATELET # BLD AUTO: 311 K/UL (ref 150–400)
POTASSIUM SERPL-SCNC: 4.4 MMOL/L (ref 3.5–5.1)
PROT SERPL-MCNC: 7.5 G/DL (ref 6.4–8.2)
PROTHROMBIN TIME: 9.6 SEC (ref 9–11.1)
Q-T INTERVAL, ECG07: 372 MS
QRS DURATION, ECG06: 84 MS
QTC CALCULATION (BEZET), ECG08: 426 MS
RBC # BLD AUTO: 4.36 M/UL (ref 3.8–5.2)
SODIUM SERPL-SCNC: 140 MMOL/L (ref 136–145)
THERAPEUTIC RANGE,PTTT: NORMAL SECS (ref 58–77)
VENTRICULAR RATE, ECG03: 79 BPM
WBC # BLD AUTO: 6.7 K/UL (ref 3.6–11)

## 2017-11-13 PROCEDURE — 86900 BLOOD TYPING SEROLOGIC ABO: CPT | Performed by: COLON & RECTAL SURGERY

## 2017-11-13 PROCEDURE — 93005 ELECTROCARDIOGRAM TRACING: CPT

## 2017-11-13 PROCEDURE — 71020 XR CHEST PA LAT: CPT

## 2017-11-13 PROCEDURE — 85730 THROMBOPLASTIN TIME PARTIAL: CPT | Performed by: COLON & RECTAL SURGERY

## 2017-11-13 PROCEDURE — 80053 COMPREHEN METABOLIC PANEL: CPT | Performed by: COLON & RECTAL SURGERY

## 2017-11-13 PROCEDURE — 85025 COMPLETE CBC W/AUTO DIFF WBC: CPT | Performed by: COLON & RECTAL SURGERY

## 2017-11-13 PROCEDURE — 85610 PROTHROMBIN TIME: CPT | Performed by: COLON & RECTAL SURGERY

## 2017-11-13 PROCEDURE — 36415 COLL VENOUS BLD VENIPUNCTURE: CPT | Performed by: COLON & RECTAL SURGERY

## 2017-11-13 RX ORDER — VALSARTAN 160 MG/1
160 TABLET ORAL DAILY
COMMUNITY

## 2017-11-13 NOTE — PERIOP NOTES
PT/FAMILY PROVIDED AND REVIEWED PRE-OP INSTRUCTION SHEET. PATIENT GIVEN SURGICAL SITE INFORMATION FAQS INFORMATION HANDOUT. PT PROVIDED WITH GOOD HAND HYGIENE TIPS. PT GIVEN OPPORTUNITY TO ASK QUESTIONS.   PATIENT PROVIDED WITH DYLAN WIPES, ORAL AND WRITTEN INSTRUCTIONS

## 2017-11-20 ENCOUNTER — HOSPITAL ENCOUNTER (INPATIENT)
Age: 52
LOS: 2 days | Discharge: HOME | DRG: 331 | End: 2017-11-22
Attending: COLON & RECTAL SURGERY | Admitting: COLON & RECTAL SURGERY
Payer: COMMERCIAL

## 2017-11-20 ENCOUNTER — ANESTHESIA EVENT (OUTPATIENT)
Dept: SURGERY | Age: 52
DRG: 331 | End: 2017-11-20
Payer: COMMERCIAL

## 2017-11-20 ENCOUNTER — ANESTHESIA (OUTPATIENT)
Dept: SURGERY | Age: 52
DRG: 331 | End: 2017-11-20
Payer: COMMERCIAL

## 2017-11-20 PROBLEM — Z93.2 ILEOSTOMY IN PLACE (HCC): Status: ACTIVE | Noted: 2017-11-20

## 2017-11-20 LAB
HCT VFR BLD AUTO: 33.6 % (ref 35–47)
HGB BLD-MCNC: 11.4 G/DL (ref 11.5–16)

## 2017-11-20 PROCEDURE — 88302 TISSUE EXAM BY PATHOLOGIST: CPT | Performed by: COLON & RECTAL SURGERY

## 2017-11-20 PROCEDURE — 74011250636 HC RX REV CODE- 250/636

## 2017-11-20 PROCEDURE — 77030002966 HC SUT PDS J&J -A: Performed by: COLON & RECTAL SURGERY

## 2017-11-20 PROCEDURE — 77030018836 HC SOL IRR NACL ICUM -A: Performed by: COLON & RECTAL SURGERY

## 2017-11-20 PROCEDURE — 74011000250 HC RX REV CODE- 250: Performed by: COLON & RECTAL SURGERY

## 2017-11-20 PROCEDURE — 77030026438 HC STYL ET INTUB CARD -A: Performed by: ANESTHESIOLOGY

## 2017-11-20 PROCEDURE — 0DBB0ZZ EXCISION OF ILEUM, OPEN APPROACH: ICD-10-PCS | Performed by: COLON & RECTAL SURGERY

## 2017-11-20 PROCEDURE — 77030018521 HC STPLR ENDOSCOPIC J&J -C: Performed by: COLON & RECTAL SURGERY

## 2017-11-20 PROCEDURE — 74011250636 HC RX REV CODE- 250/636: Performed by: ANESTHESIOLOGY

## 2017-11-20 PROCEDURE — 74011000258 HC RX REV CODE- 258: Performed by: COLON & RECTAL SURGERY

## 2017-11-20 PROCEDURE — 36415 COLL VENOUS BLD VENIPUNCTURE: CPT | Performed by: COLON & RECTAL SURGERY

## 2017-11-20 PROCEDURE — 74011250636 HC RX REV CODE- 250/636: Performed by: COLON & RECTAL SURGERY

## 2017-11-20 PROCEDURE — 88304 TISSUE EXAM BY PATHOLOGIST: CPT | Performed by: COLON & RECTAL SURGERY

## 2017-11-20 PROCEDURE — 77030020782 HC GWN BAIR PAWS FLX 3M -B

## 2017-11-20 PROCEDURE — 76210000016 HC OR PH I REC 1 TO 1.5 HR: Performed by: COLON & RECTAL SURGERY

## 2017-11-20 PROCEDURE — 74011250637 HC RX REV CODE- 250/637: Performed by: COLON & RECTAL SURGERY

## 2017-11-20 PROCEDURE — 77030032490 HC SLV COMPR SCD KNE COVD -B: Performed by: COLON & RECTAL SURGERY

## 2017-11-20 PROCEDURE — 77030009978 HC RELD STPLR TCR J&J -B: Performed by: COLON & RECTAL SURGERY

## 2017-11-20 PROCEDURE — 76010000131 HC OR TIME 2 TO 2.5 HR: Performed by: COLON & RECTAL SURGERY

## 2017-11-20 PROCEDURE — 65270000029 HC RM PRIVATE

## 2017-11-20 PROCEDURE — 77030012893

## 2017-11-20 PROCEDURE — 77030019908 HC STETH ESOPH SIMS -A: Performed by: ANESTHESIOLOGY

## 2017-11-20 PROCEDURE — 85018 HEMOGLOBIN: CPT | Performed by: COLON & RECTAL SURGERY

## 2017-11-20 PROCEDURE — 74011000250 HC RX REV CODE- 250

## 2017-11-20 PROCEDURE — 77030011640 HC PAD GRND REM COVD -A: Performed by: COLON & RECTAL SURGERY

## 2017-11-20 PROCEDURE — 0WQF0ZZ REPAIR ABDOMINAL WALL, OPEN APPROACH: ICD-10-PCS | Performed by: COLON & RECTAL SURGERY

## 2017-11-20 PROCEDURE — 77030011278 HC ELECTRD LIG IMPT COVD -F: Performed by: COLON & RECTAL SURGERY

## 2017-11-20 PROCEDURE — 77030002996 HC SUT SLK J&J -A: Performed by: COLON & RECTAL SURGERY

## 2017-11-20 PROCEDURE — 77030031139 HC SUT VCRL2 J&J -A: Performed by: COLON & RECTAL SURGERY

## 2017-11-20 PROCEDURE — 77030034850: Performed by: COLON & RECTAL SURGERY

## 2017-11-20 PROCEDURE — 77030008684 HC TU ET CUF COVD -B: Performed by: ANESTHESIOLOGY

## 2017-11-20 PROCEDURE — 77030019702 HC WRP THER MENM -C: Performed by: COLON & RECTAL SURGERY

## 2017-11-20 PROCEDURE — 76060000035 HC ANESTHESIA 2 TO 2.5 HR: Performed by: COLON & RECTAL SURGERY

## 2017-11-20 RX ORDER — SODIUM CHLORIDE, SODIUM LACTATE, POTASSIUM CHLORIDE, CALCIUM CHLORIDE 600; 310; 30; 20 MG/100ML; MG/100ML; MG/100ML; MG/100ML
100 INJECTION, SOLUTION INTRAVENOUS CONTINUOUS
Status: DISCONTINUED | OUTPATIENT
Start: 2017-11-20 | End: 2017-11-20 | Stop reason: SDUPTHER

## 2017-11-20 RX ORDER — SODIUM CHLORIDE 9 MG/ML
50 INJECTION, SOLUTION INTRAVENOUS CONTINUOUS
Status: DISCONTINUED | OUTPATIENT
Start: 2017-11-20 | End: 2017-11-20

## 2017-11-20 RX ORDER — SODIUM CHLORIDE 0.9 % (FLUSH) 0.9 %
5-10 SYRINGE (ML) INJECTION EVERY 8 HOURS
Status: DISCONTINUED | OUTPATIENT
Start: 2017-11-20 | End: 2017-11-20 | Stop reason: HOSPADM

## 2017-11-20 RX ORDER — LOSARTAN POTASSIUM 50 MG/1
100 TABLET ORAL DAILY
Status: DISCONTINUED | OUTPATIENT
Start: 2017-11-21 | End: 2017-11-22 | Stop reason: HOSPADM

## 2017-11-20 RX ORDER — HYDROMORPHONE HYDROCHLORIDE 2 MG/ML
2 INJECTION, SOLUTION INTRAMUSCULAR; INTRAVENOUS; SUBCUTANEOUS
Status: DISCONTINUED | OUTPATIENT
Start: 2017-11-20 | End: 2017-11-21 | Stop reason: SDUPTHER

## 2017-11-20 RX ORDER — MORPHINE SULFATE 10 MG/ML
2 INJECTION, SOLUTION INTRAMUSCULAR; INTRAVENOUS
Status: DISCONTINUED | OUTPATIENT
Start: 2017-11-20 | End: 2017-11-20 | Stop reason: HOSPADM

## 2017-11-20 RX ORDER — FENTANYL CITRATE 50 UG/ML
25 INJECTION, SOLUTION INTRAMUSCULAR; INTRAVENOUS
Status: DISCONTINUED | OUTPATIENT
Start: 2017-11-20 | End: 2017-11-20 | Stop reason: HOSPADM

## 2017-11-20 RX ORDER — ROCURONIUM BROMIDE 10 MG/ML
INJECTION, SOLUTION INTRAVENOUS AS NEEDED
Status: DISCONTINUED | OUTPATIENT
Start: 2017-11-20 | End: 2017-11-20 | Stop reason: HOSPADM

## 2017-11-20 RX ORDER — VALSARTAN 80 MG/1
160 TABLET ORAL DAILY
Status: DISCONTINUED | OUTPATIENT
Start: 2017-11-21 | End: 2017-11-20 | Stop reason: CLARIF

## 2017-11-20 RX ORDER — MIDAZOLAM HYDROCHLORIDE 1 MG/ML
INJECTION, SOLUTION INTRAMUSCULAR; INTRAVENOUS AS NEEDED
Status: DISCONTINUED | OUTPATIENT
Start: 2017-11-20 | End: 2017-11-20 | Stop reason: HOSPADM

## 2017-11-20 RX ORDER — OXYCODONE HYDROCHLORIDE 5 MG/1
5 TABLET ORAL
Status: DISCONTINUED | OUTPATIENT
Start: 2017-11-20 | End: 2017-11-22 | Stop reason: HOSPADM

## 2017-11-20 RX ORDER — ONDANSETRON 2 MG/ML
INJECTION INTRAMUSCULAR; INTRAVENOUS AS NEEDED
Status: DISCONTINUED | OUTPATIENT
Start: 2017-11-20 | End: 2017-11-20 | Stop reason: HOSPADM

## 2017-11-20 RX ORDER — DIPHENHYDRAMINE HYDROCHLORIDE 50 MG/ML
12.5 INJECTION, SOLUTION INTRAMUSCULAR; INTRAVENOUS AS NEEDED
Status: DISCONTINUED | OUTPATIENT
Start: 2017-11-20 | End: 2017-11-20 | Stop reason: HOSPADM

## 2017-11-20 RX ORDER — LABETALOL HYDROCHLORIDE 5 MG/ML
INJECTION, SOLUTION INTRAVENOUS
Status: COMPLETED
Start: 2017-11-20 | End: 2017-11-20

## 2017-11-20 RX ORDER — PROPOFOL 10 MG/ML
INJECTION, EMULSION INTRAVENOUS AS NEEDED
Status: DISCONTINUED | OUTPATIENT
Start: 2017-11-20 | End: 2017-11-20 | Stop reason: HOSPADM

## 2017-11-20 RX ORDER — MIDAZOLAM HYDROCHLORIDE 1 MG/ML
1 INJECTION, SOLUTION INTRAMUSCULAR; INTRAVENOUS AS NEEDED
Status: DISCONTINUED | OUTPATIENT
Start: 2017-11-20 | End: 2017-11-20 | Stop reason: SDUPTHER

## 2017-11-20 RX ORDER — LIDOCAINE HYDROCHLORIDE 10 MG/ML
0.1 INJECTION, SOLUTION EPIDURAL; INFILTRATION; INTRACAUDAL; PERINEURAL AS NEEDED
Status: DISCONTINUED | OUTPATIENT
Start: 2017-11-20 | End: 2017-11-20 | Stop reason: HOSPADM

## 2017-11-20 RX ORDER — ACETAMINOPHEN 325 MG/1
650 TABLET ORAL
Status: DISCONTINUED | OUTPATIENT
Start: 2017-11-20 | End: 2017-11-22 | Stop reason: HOSPADM

## 2017-11-20 RX ORDER — LIDOCAINE HYDROCHLORIDE 20 MG/ML
INJECTION, SOLUTION EPIDURAL; INFILTRATION; INTRACAUDAL; PERINEURAL AS NEEDED
Status: DISCONTINUED | OUTPATIENT
Start: 2017-11-20 | End: 2017-11-20 | Stop reason: HOSPADM

## 2017-11-20 RX ORDER — ENOXAPARIN SODIUM 100 MG/ML
40 INJECTION SUBCUTANEOUS EVERY 24 HOURS
Status: DISCONTINUED | OUTPATIENT
Start: 2017-11-21 | End: 2017-11-22 | Stop reason: HOSPADM

## 2017-11-20 RX ORDER — ALVIMOPAN 12 MG/1
12 CAPSULE ORAL 2 TIMES DAILY
Status: DISCONTINUED | OUTPATIENT
Start: 2017-11-21 | End: 2017-11-22 | Stop reason: HOSPADM

## 2017-11-20 RX ORDER — CELECOXIB 100 MG/1
100 CAPSULE ORAL 2 TIMES DAILY
Status: DISCONTINUED | OUTPATIENT
Start: 2017-11-21 | End: 2017-11-22 | Stop reason: HOSPADM

## 2017-11-20 RX ORDER — LABETALOL HYDROCHLORIDE 5 MG/ML
5 INJECTION, SOLUTION INTRAVENOUS
Status: DISCONTINUED | OUTPATIENT
Start: 2017-11-20 | End: 2017-11-20 | Stop reason: HOSPADM

## 2017-11-20 RX ORDER — ALVIMOPAN 12 MG/1
12 CAPSULE ORAL ONCE
Status: COMPLETED | OUTPATIENT
Start: 2017-11-20 | End: 2017-11-20

## 2017-11-20 RX ORDER — SODIUM CHLORIDE 9 MG/ML
150 INJECTION, SOLUTION INTRAVENOUS CONTINUOUS
Status: DISCONTINUED | OUTPATIENT
Start: 2017-11-20 | End: 2017-11-22 | Stop reason: HOSPADM

## 2017-11-20 RX ORDER — KETOROLAC TROMETHAMINE 30 MG/ML
15 INJECTION, SOLUTION INTRAMUSCULAR; INTRAVENOUS EVERY 6 HOURS
Status: COMPLETED | OUTPATIENT
Start: 2017-11-20 | End: 2017-11-21

## 2017-11-20 RX ORDER — SODIUM CHLORIDE 0.9 % (FLUSH) 0.9 %
5-10 SYRINGE (ML) INJECTION AS NEEDED
Status: DISCONTINUED | OUTPATIENT
Start: 2017-11-20 | End: 2017-11-20 | Stop reason: SDUPTHER

## 2017-11-20 RX ORDER — BUPIVACAINE HYDROCHLORIDE AND EPINEPHRINE 2.5; 5 MG/ML; UG/ML
INJECTION, SOLUTION EPIDURAL; INFILTRATION; INTRACAUDAL; PERINEURAL AS NEEDED
Status: DISCONTINUED | OUTPATIENT
Start: 2017-11-20 | End: 2017-11-20 | Stop reason: HOSPADM

## 2017-11-20 RX ORDER — MIDAZOLAM HYDROCHLORIDE 1 MG/ML
0.5 INJECTION, SOLUTION INTRAMUSCULAR; INTRAVENOUS
Status: DISCONTINUED | OUTPATIENT
Start: 2017-11-20 | End: 2017-11-20 | Stop reason: HOSPADM

## 2017-11-20 RX ORDER — ONDANSETRON 4 MG/1
4 TABLET, ORALLY DISINTEGRATING ORAL
Status: DISCONTINUED | OUTPATIENT
Start: 2017-11-20 | End: 2017-11-22 | Stop reason: HOSPADM

## 2017-11-20 RX ORDER — SUCCINYLCHOLINE CHLORIDE 20 MG/ML
INJECTION INTRAMUSCULAR; INTRAVENOUS AS NEEDED
Status: DISCONTINUED | OUTPATIENT
Start: 2017-11-20 | End: 2017-11-20 | Stop reason: HOSPADM

## 2017-11-20 RX ORDER — MORPHINE SULFATE 4 MG/ML
INJECTION, SOLUTION INTRAMUSCULAR; INTRAVENOUS AS NEEDED
Status: DISCONTINUED | OUTPATIENT
Start: 2017-11-20 | End: 2017-11-20 | Stop reason: HOSPADM

## 2017-11-20 RX ORDER — DEXAMETHASONE SODIUM PHOSPHATE 4 MG/ML
INJECTION, SOLUTION INTRA-ARTICULAR; INTRALESIONAL; INTRAMUSCULAR; INTRAVENOUS; SOFT TISSUE AS NEEDED
Status: DISCONTINUED | OUTPATIENT
Start: 2017-11-20 | End: 2017-11-20 | Stop reason: HOSPADM

## 2017-11-20 RX ORDER — SODIUM CHLORIDE 0.9 % (FLUSH) 0.9 %
5-10 SYRINGE (ML) INJECTION AS NEEDED
Status: DISCONTINUED | OUTPATIENT
Start: 2017-11-20 | End: 2017-11-20 | Stop reason: HOSPADM

## 2017-11-20 RX ORDER — HYDROMORPHONE HYDROCHLORIDE 2 MG/ML
1 INJECTION, SOLUTION INTRAMUSCULAR; INTRAVENOUS; SUBCUTANEOUS
Status: DISCONTINUED | OUTPATIENT
Start: 2017-11-20 | End: 2017-11-20

## 2017-11-20 RX ORDER — SODIUM CHLORIDE, SODIUM LACTATE, POTASSIUM CHLORIDE, CALCIUM CHLORIDE 600; 310; 30; 20 MG/100ML; MG/100ML; MG/100ML; MG/100ML
100 INJECTION, SOLUTION INTRAVENOUS CONTINUOUS
Status: DISCONTINUED | OUTPATIENT
Start: 2017-11-20 | End: 2017-11-20 | Stop reason: HOSPADM

## 2017-11-20 RX ORDER — ONDANSETRON 2 MG/ML
4 INJECTION INTRAMUSCULAR; INTRAVENOUS ONCE
Status: COMPLETED | OUTPATIENT
Start: 2017-11-20 | End: 2017-11-20

## 2017-11-20 RX ORDER — NALOXONE HYDROCHLORIDE 0.4 MG/ML
0.4 INJECTION, SOLUTION INTRAMUSCULAR; INTRAVENOUS; SUBCUTANEOUS AS NEEDED
Status: DISCONTINUED | OUTPATIENT
Start: 2017-11-20 | End: 2017-11-22 | Stop reason: HOSPADM

## 2017-11-20 RX ORDER — MIDAZOLAM HYDROCHLORIDE 1 MG/ML
1 INJECTION, SOLUTION INTRAMUSCULAR; INTRAVENOUS AS NEEDED
Status: DISCONTINUED | OUTPATIENT
Start: 2017-11-20 | End: 2017-11-20 | Stop reason: HOSPADM

## 2017-11-20 RX ORDER — FENTANYL CITRATE 50 UG/ML
50 INJECTION, SOLUTION INTRAMUSCULAR; INTRAVENOUS AS NEEDED
Status: DISCONTINUED | OUTPATIENT
Start: 2017-11-20 | End: 2017-11-20 | Stop reason: HOSPADM

## 2017-11-20 RX ORDER — ONDANSETRON 2 MG/ML
INJECTION INTRAMUSCULAR; INTRAVENOUS
Status: COMPLETED
Start: 2017-11-20 | End: 2017-11-20

## 2017-11-20 RX ORDER — HYDROMORPHONE HYDROCHLORIDE 1 MG/ML
0.2 INJECTION, SOLUTION INTRAMUSCULAR; INTRAVENOUS; SUBCUTANEOUS
Status: COMPLETED | OUTPATIENT
Start: 2017-11-20 | End: 2017-11-20

## 2017-11-20 RX ORDER — SODIUM CHLORIDE, SODIUM LACTATE, POTASSIUM CHLORIDE, CALCIUM CHLORIDE 600; 310; 30; 20 MG/100ML; MG/100ML; MG/100ML; MG/100ML
25 INJECTION, SOLUTION INTRAVENOUS CONTINUOUS
Status: DISCONTINUED | OUTPATIENT
Start: 2017-11-20 | End: 2017-11-20 | Stop reason: HOSPADM

## 2017-11-20 RX ORDER — HYDROMORPHONE HYDROCHLORIDE 1 MG/ML
INJECTION, SOLUTION INTRAMUSCULAR; INTRAVENOUS; SUBCUTANEOUS
Status: COMPLETED
Start: 2017-11-20 | End: 2017-11-20

## 2017-11-20 RX ORDER — FENTANYL CITRATE 50 UG/ML
INJECTION, SOLUTION INTRAMUSCULAR; INTRAVENOUS AS NEEDED
Status: DISCONTINUED | OUTPATIENT
Start: 2017-11-20 | End: 2017-11-20 | Stop reason: HOSPADM

## 2017-11-20 RX ORDER — LORAZEPAM 2 MG/ML
1 INJECTION INTRAMUSCULAR
Status: DISCONTINUED | OUTPATIENT
Start: 2017-11-20 | End: 2017-11-22 | Stop reason: HOSPADM

## 2017-11-20 RX ORDER — ROPIVACAINE HYDROCHLORIDE 5 MG/ML
150 INJECTION, SOLUTION EPIDURAL; INFILTRATION; PERINEURAL AS NEEDED
Status: DISCONTINUED | OUTPATIENT
Start: 2017-11-20 | End: 2017-11-20 | Stop reason: HOSPADM

## 2017-11-20 RX ADMIN — MIDAZOLAM HYDROCHLORIDE 0.5 MG: 1 INJECTION, SOLUTION INTRAMUSCULAR; INTRAVENOUS at 10:40

## 2017-11-20 RX ADMIN — HYDROMORPHONE HYDROCHLORIDE 0.2 MG: 1 INJECTION, SOLUTION INTRAMUSCULAR; INTRAVENOUS; SUBCUTANEOUS at 10:00

## 2017-11-20 RX ADMIN — LABETALOL HYDROCHLORIDE 5 MG: 5 INJECTION, SOLUTION INTRAVENOUS at 11:35

## 2017-11-20 RX ADMIN — ONDANSETRON 4 MG: 2 INJECTION INTRAMUSCULAR; INTRAVENOUS at 10:15

## 2017-11-20 RX ADMIN — DEXAMETHASONE SODIUM PHOSPHATE 4 MG: 4 INJECTION, SOLUTION INTRA-ARTICULAR; INTRALESIONAL; INTRAMUSCULAR; INTRAVENOUS; SOFT TISSUE at 07:46

## 2017-11-20 RX ADMIN — MEPERIDINE HYDROCHLORIDE 12.5 MG: 25 INJECTION INTRAMUSCULAR; INTRAVENOUS; SUBCUTANEOUS at 10:42

## 2017-11-20 RX ADMIN — PROPOFOL 50 MG: 10 INJECTION, EMULSION INTRAVENOUS at 09:17

## 2017-11-20 RX ADMIN — SODIUM CHLORIDE, SODIUM LACTATE, POTASSIUM CHLORIDE, AND CALCIUM CHLORIDE 25 ML/HR: 600; 310; 30; 20 INJECTION, SOLUTION INTRAVENOUS at 06:49

## 2017-11-20 RX ADMIN — ALVIMOPAN 12 MG: 12 CAPSULE ORAL at 06:53

## 2017-11-20 RX ADMIN — MORPHINE SULFATE 4 MG: 4 INJECTION, SOLUTION INTRAMUSCULAR; INTRAVENOUS at 08:43

## 2017-11-20 RX ADMIN — SUCCINYLCHOLINE CHLORIDE 140 MG: 20 INJECTION INTRAMUSCULAR; INTRAVENOUS at 07:38

## 2017-11-20 RX ADMIN — HYDROMORPHONE HYDROCHLORIDE 0.2 MG: 1 INJECTION, SOLUTION INTRAMUSCULAR; INTRAVENOUS; SUBCUTANEOUS at 10:15

## 2017-11-20 RX ADMIN — SODIUM CHLORIDE, SODIUM LACTATE, POTASSIUM CHLORIDE, AND CALCIUM CHLORIDE: 600; 310; 30; 20 INJECTION, SOLUTION INTRAVENOUS at 09:21

## 2017-11-20 RX ADMIN — FENTANYL CITRATE 25 MCG: 50 INJECTION, SOLUTION INTRAMUSCULAR; INTRAVENOUS at 11:40

## 2017-11-20 RX ADMIN — HYDROMORPHONE HYDROCHLORIDE 0.2 MG: 1 INJECTION, SOLUTION INTRAMUSCULAR; INTRAVENOUS; SUBCUTANEOUS at 11:00

## 2017-11-20 RX ADMIN — SODIUM CHLORIDE 150 ML/HR: 900 INJECTION, SOLUTION INTRAVENOUS at 22:05

## 2017-11-20 RX ADMIN — SUCCINYLCHOLINE CHLORIDE 20 MG: 20 INJECTION INTRAMUSCULAR; INTRAVENOUS at 09:25

## 2017-11-20 RX ADMIN — LABETALOL HYDROCHLORIDE 5 MG: 5 INJECTION, SOLUTION INTRAVENOUS at 11:45

## 2017-11-20 RX ADMIN — MEPERIDINE HYDROCHLORIDE 12.5 MG: 25 INJECTION INTRAMUSCULAR; INTRAVENOUS; SUBCUTANEOUS at 10:35

## 2017-11-20 RX ADMIN — LABETALOL HYDROCHLORIDE 5 MG: 5 INJECTION, SOLUTION INTRAVENOUS at 11:40

## 2017-11-20 RX ADMIN — MIDAZOLAM HYDROCHLORIDE 2 MG: 1 INJECTION, SOLUTION INTRAMUSCULAR; INTRAVENOUS at 07:28

## 2017-11-20 RX ADMIN — HYDROMORPHONE HYDROCHLORIDE 0.2 MG: 1 INJECTION, SOLUTION INTRAMUSCULAR; INTRAVENOUS; SUBCUTANEOUS at 10:30

## 2017-11-20 RX ADMIN — HYDROMORPHONE HYDROCHLORIDE 0.2 MG: 1 INJECTION, SOLUTION INTRAMUSCULAR; INTRAVENOUS; SUBCUTANEOUS at 10:45

## 2017-11-20 RX ADMIN — LIDOCAINE HYDROCHLORIDE 100 MG: 20 INJECTION, SOLUTION EPIDURAL; INFILTRATION; INTRACAUDAL; PERINEURAL at 07:38

## 2017-11-20 RX ADMIN — ROCURONIUM BROMIDE 40 MG: 10 INJECTION, SOLUTION INTRAVENOUS at 07:45

## 2017-11-20 RX ADMIN — HYDROMORPHONE HYDROCHLORIDE 1 MG: 2 INJECTION, SOLUTION INTRAMUSCULAR; INTRAVENOUS; SUBCUTANEOUS at 14:11

## 2017-11-20 RX ADMIN — KETOROLAC TROMETHAMINE 15 MG: 30 INJECTION, SOLUTION INTRAMUSCULAR at 10:20

## 2017-11-20 RX ADMIN — FENTANYL CITRATE 100 MCG: 50 INJECTION, SOLUTION INTRAMUSCULAR; INTRAVENOUS at 07:38

## 2017-11-20 RX ADMIN — KETOROLAC TROMETHAMINE 15 MG: 30 INJECTION, SOLUTION INTRAMUSCULAR at 16:11

## 2017-11-20 RX ADMIN — SODIUM CHLORIDE 50 ML/HR: 900 INJECTION, SOLUTION INTRAVENOUS at 11:00

## 2017-11-20 RX ADMIN — PROPOFOL 50 MG: 10 INJECTION, EMULSION INTRAVENOUS at 09:20

## 2017-11-20 RX ADMIN — FENTANYL CITRATE 25 MCG: 50 INJECTION, SOLUTION INTRAMUSCULAR; INTRAVENOUS at 10:40

## 2017-11-20 RX ADMIN — ONDANSETRON 4 MG: 2 INJECTION INTRAMUSCULAR; INTRAVENOUS at 09:31

## 2017-11-20 RX ADMIN — KETOROLAC TROMETHAMINE 15 MG: 30 INJECTION, SOLUTION INTRAMUSCULAR at 22:06

## 2017-11-20 RX ADMIN — LABETALOL HYDROCHLORIDE 5 MG: 5 INJECTION, SOLUTION INTRAVENOUS at 11:50

## 2017-11-20 RX ADMIN — ROCURONIUM BROMIDE 10 MG: 10 INJECTION, SOLUTION INTRAVENOUS at 07:38

## 2017-11-20 RX ADMIN — LABETALOL HYDROCHLORIDE 5 MG: 5 INJECTION INTRAVENOUS at 11:35

## 2017-11-20 RX ADMIN — HYDROMORPHONE HYDROCHLORIDE 1 MG: 2 INJECTION, SOLUTION INTRAMUSCULAR; INTRAVENOUS; SUBCUTANEOUS at 12:34

## 2017-11-20 RX ADMIN — SODIUM CHLORIDE 1000 ML: 900 INJECTION, SOLUTION INTRAVENOUS at 18:44

## 2017-11-20 RX ADMIN — FENTANYL CITRATE 100 MCG: 50 INJECTION, SOLUTION INTRAMUSCULAR; INTRAVENOUS at 08:05

## 2017-11-20 RX ADMIN — PROPOFOL 200 MG: 10 INJECTION, EMULSION INTRAVENOUS at 07:38

## 2017-11-20 RX ADMIN — FENTANYL CITRATE 50 MCG: 50 INJECTION, SOLUTION INTRAMUSCULAR; INTRAVENOUS at 09:27

## 2017-11-20 RX ADMIN — CEFOTETAN DISODIUM 2 G: 2 INJECTION, POWDER, FOR SOLUTION INTRAMUSCULAR; INTRAVENOUS at 07:43

## 2017-11-20 RX ADMIN — SUCCINYLCHOLINE CHLORIDE 20 MG: 20 INJECTION INTRAMUSCULAR; INTRAVENOUS at 09:23

## 2017-11-20 RX ADMIN — LORAZEPAM 1 MG: 2 INJECTION INTRAMUSCULAR; INTRAVENOUS at 17:04

## 2017-11-20 RX ADMIN — PROPOFOL 50 MG: 10 INJECTION, EMULSION INTRAVENOUS at 09:26

## 2017-11-20 RX ADMIN — ROCURONIUM BROMIDE 10 MG: 10 INJECTION, SOLUTION INTRAVENOUS at 09:25

## 2017-11-20 RX ADMIN — SUCCINYLCHOLINE CHLORIDE 20 MG: 20 INJECTION INTRAMUSCULAR; INTRAVENOUS at 09:17

## 2017-11-20 RX ADMIN — HYDROMORPHONE HYDROCHLORIDE 2 MG: 2 INJECTION, SOLUTION INTRAMUSCULAR; INTRAVENOUS; SUBCUTANEOUS at 18:43

## 2017-11-20 RX ADMIN — HYDROMORPHONE HYDROCHLORIDE 2 MG: 2 INJECTION, SOLUTION INTRAMUSCULAR; INTRAVENOUS; SUBCUTANEOUS at 23:13

## 2017-11-20 RX ADMIN — ROCURONIUM BROMIDE 20 MG: 10 INJECTION, SOLUTION INTRAVENOUS at 08:42

## 2017-11-20 NOTE — H&P
Colon and Rectal Surgery History and Physical    Subjective:      Jessi Swanson is a 46 y.o. female who has a hx of ileostomy    Patient Active Problem List    Diagnosis Date Noted    Ileostomy in place [facility]) 11/20/2017    Colostomy in place [facility]) 09/18/2017    Perforated diverticulum 05/14/2017    Diverticulitis 03/13/2016    Tobacco abuse 03/13/2016    Intestinal diverticular abscess 08/16/2011     Past Medical History:   Diagnosis Date    Bursitis     RT. SHOULDER    Hypertension     Morbid obesity ([address]     Sigmoid diverticulitis     Tobacco use       Past Surgical History:   Procedure Laterality Date    HX GI  05/2017    COLOSTOMY-PERFORATED BOWEL D/T DIVERTICULITIS    HX GI  09/18/2017    COLOSTOMY TAKEDOWN, DIVERTING LOOP ILEOSTOMY    HX GYN  1990    ectopic pregnancy    HX TUBAL LIGATION        Social History   Substance Use Topics    Smoking status: Former Smoker     Packs/day: 1.50     Years: 37.00     Types: Cigarettes     Quit date: 5/15/2017    Smokeless tobacco: Never Used    Alcohol use 0.0 oz/week     0 Standard drinks or equivalent per week      Comment: socially      Family History   Problem Relation Age of Onset    Cancer Mother      UTERINE    Cancer Father      COLON    Other Father      DIVERTICULITIS    Cancer Sister      SKIN-MELANOMA    No Known Problems Brother     No Known Problems Brother     Anesth Problems Neg Hx     Alcohol abuse Neg Hx       Prior to Admission medications    Medication Sig Start Date End Date Taking? Authorizing Provider   valsartan (DIOVAN) 160 mg tablet Take 160 mg by mouth daily. Yes Historical Provider   BIOTIN PO Take 3-4 Tabs by mouth daily. Yes Historical Provider   ibuprofen (MOTRIN) 200 mg tablet Take 200 mg by mouth every eight (8) hours as needed for Pain. Yes Historical Provider   diphenhydrAMINE (BENADRYL) 25 mg capsule Take 25 mg by mouth nightly as needed.    Yes Historical Provider     Allergies   Allergen Reactions    Lisinopril Cough        Review of Systems:    A comprehensive review of systems was negative except for that written in the History of Present Illness. Objective:     Visit Vitals    BP (!) 141/92 (BP 1 Location: Left arm, BP Patient Position: At rest)    Pulse 91    Temp 98.3 °F (36.8 °C)    Resp 17    Ht 5' 2\" (1.575 m)    Wt 95.3 kg (210 lb)    LMP 08/26/2014    SpO2 97%    BMI 38.41 kg/m2        Physical Exam:   nad  Chest clear  Heart reg  abd soft    Imaging:  images and reports reviewed    Lab Review:  No results found for this or any previous visit (from the past 24 hour(s)). Labs and radiology: images and reports reviewed      Assessment:     ileostomy    Plan:     1. I recommend proceeding with ileostomy takedown. Treatment alternatives were discussed. 2. Discussed aspects of surgical intervention, methods, risks (including by not limited to infection, bleeding, hematoma, and perforation of the intestines or solid organs), and the risks of general anesthetic. The patient understands the risks; any and all questions were answered to the patient's satisfaction.     Signed By: Lai Guzman MD     November 20, 2017

## 2017-11-20 NOTE — PERIOP NOTES
TRANSFER - OUT REPORT:    Verbal report given to NURSE COURTNEY(name) on Andrea Mendez  being transferred to [facility](unit) for routine post - op       Report consisted of patients Situation, Background, Assessment and   Recommendations(SBAR). Time Pre op antibiotic given:CEFOTETAN 2 [email or web address]  Anesthesia Stop time: 1000  Simpson Present on Transfer to floor:YES  Order for Simpson on Chart:YES    Information from the following report(s) SBAR, OR Summary, Procedure Summary, MAR and Recent Results was reviewed with the receiving nurse. Opportunity for questions and clarification was provided. Is the patient on 02? YES       L/Min 2      Is the patient on a monitor? NO    Is the nurse transporting with the patient? NO    Surgical Waiting Area notified of patient's transfer from PACU? YES      The following personal items collected during your admission accompanied patient upon transfer:   Dental Appliance:    Vision:    Hearing Aid:    Jewelry:    Clothing: Clothing: Other (comment) (clothing bag to pacu)  Other Valuables:  Other Valuables: Eyeglasses (glasses to pacu)  Valuables sent to safe:

## 2017-11-20 NOTE — OP NOTES
[address] [facility] [address] [place] [address]   OP NOTE       Name:  Bethel Haddad   MR#:  [id number]   :  1965   Account #:  [de-identified]    Surgery Date:  2017   Date of Adm:  2017       PREOPERATIVE DIAGNOSIS: History of diverting loop ileostomy. POSTOPERATIVE DIAGNOSIS: History of diverting loop ileostomy. PROCEDURE: Loop ileostomy takedown. SURGEON: Aspen Livingston MD.    ANESTHESIA: General plus 10 mL of 0.25% Marcaine with   epinephrine. ESTIMATED BLOOD LOSS: 50 mL. SPECIMENS REMOVED: Ileostomy trim sent to Pathology. DISPOSITION: The patient tolerated the procedure well and was   transferred to recovery in stable condition. INDICATIONS: This is a patient with history of a diverting loop   ileostomy after perforated sigmoid diverticulitis. Risks, benefits,   alternatives of ileostomy takedown were discussed. DESCRIPTION OF PROCEDURE: She was taken to the operating   room, placed on the operating table in normal supine position. After   induction of anesthesia, the stoma was occluded and the abdomen   was prepped and draped in the usual fashion. Time-out was   performed. Marcaine 0.25% with epinephrine was infiltrated in the skin   for local anesthetic block. Incision was made around the stoma. I was   able to dissect down to enter the abdominal cavity. There were some   dense adhesions, the stoma to the anterior abdominal wall as well as   in between loops of small bowel. I was able to safely dissect out   proximally and distally and transected with a JIA 75-mm blue load   stapler proximally and distally the stoma. I then took the mesentery   with a LigaSure. This was thickened but it was hemostatic. I then has   created a side-to-side functional end-to-end anastomosis with a JIA   75-mm blue load stapler to create a common channel, a TX 60 mm   blue load stapler to close the common enterotomy.  I oversewed the   common enterotomy with 3-0 PDS to reinforce the crotch of the anastomosis with 3-0   silk. I closed the mesentery defect with 3-0 Vicryl. Administered leak   test, it did not leak. I then placed the bowel back into the abdominal   cavity. There was no twist in the mesentery. I freshened up the edges stoma site. I removed the   hernia sac. I closed the incision with a #1 PDS in an interrupted figure-  of-eight fashion. Irrigated then closed the skin using a 2-0 Vicryl on a   CT2 needle and a Betadine-soaked gauze was placed in the wound. Sterile dressings were applied. The patient tolerated the procedure   well.         MD Digna Enriquez / Iván Tineo   D:  11/20/2017   09:45   T:  11/20/2017   11:12   Job #:  [id number]

## 2017-11-20 NOTE — IP AVS SNAPSHOT
Summary of Care Report The Summary of Care report has been created to help improve care coordination. Users with access to [employer] or [address] (Web-based application) may access additional patient information including the Discharge Summary. If you are not currently a [address] user and need more information, please call the number listed below in the [address] section and ask to be connected with Medical Records. Facility Information Name Address Phone [address] [place] [address] [id number] [phone] Patient Information Patient Name Sex  Aj Huff ([id number]) Female 1965 Discharge Information Admitting Provider Service Area Unit Rose Viera MD / [address]  Surgical Unit / [phone] Discharge Provider Discharge Date/Time Discharge Disposition Destination (none) 2017 (Pending) AHR (none) Patient Language Language ENGLISH [13] Hospital Problems as of 2017  Reviewed: 5/15/2017  3:19 PM by Seamus Hoffman MD  
  
  
  
 Class Noted - Resolved Last Modified POA Active Problems Ileostomy in place [facility])  2017 - Present 2017 by Rose Viera MD Unknown Entered by Rose Viera MD  
  
Non-Hospital Problems as of 2017  Reviewed: 5/15/2017  3:19 PM by Seamus Hoffman MD  
  
  
  
 Class Noted - Resolved Last Modified Active Problems Intestinal diverticular abscess  2011 - Present 2011   Entered by Jose Cole MD  
  Diverticulitis  3/13/2016 - Present 2017 by Portia Araiza MD  
  Entered by Gali Stephenson MD  
  Tobacco abuse  3/13/2016 - Present 3/13/2016 by Gali Stephenson MD  
  Entered by Gali Stephenson MD  
  Perforated diverticulum  2017 - Present 2017 by Burnice Carrel, MD  
 Entered by Saulo Hogue MD  
  Colostomy in place [facility])  9/18/2017 - Present 9/18/2017 by Sonali Centeno MD  
  Entered by Sonali Centeno MD  
  
You are allergic to the following Allergen Reactions Lisinopril Cough Current Discharge Medication List  
  
START taking these medications Dose & Instructions Dispensing Information Comments  
 oxyCODONE IR 5 mg immediate release tablet Commonly known as:  Kajal Fang Dose:  5 mg Take 1 Tab by mouth every four (4) hours as needed for Pain. Max Daily Amount: 30 mg.  
 Quantity:  40 Tab Refills:  0 CONTINUE these medications which have NOT CHANGED Dose & Instructions Dispensing Information Comments BENADRYL 25 mg capsule Generic drug:  diphenhydrAMINE Dose:  25 mg Take 25 mg by mouth nightly as needed. Refills:  0  
   
 BIOTIN PO Dose:  3-4 Tab Take 3-4 Tabs by mouth daily. Refills:  0  
   
 ibuprofen 200 mg tablet Commonly known as:  MOTRIN Dose:  200 mg Take 200 mg by mouth every eight (8) hours as needed for Pain. Refills:  0  
   
 valsartan 160 mg tablet Commonly known as:  DIOVAN Dose:  160 mg Take 160 mg by mouth daily. Refills:  0 Surgery Information ID Date/Time Status Primary Surgeon All Procedures Location [id number] 11/20/2017 Darci Macias MD OPEN DIVERTING LOOP ILEOSTOMY [facility] MAIN OR Follow-up Information Follow up With Details Comments Contact Info None   None (395) Patient stated that they have no PCP Discharge Instructions Freya Roman MD, FACS Dennis NICOLE. Blanca Medel MD, FACS Naif NICOLE. Darnell Moss MD, FACS Mario Rodriguez. Shilpa Garcia MD, FACS Gudelia Hoskins MD, FACS Candy Fierro. MD Sonali Alves MD 
 
[facility] Discharge Instructions for Colon Surgery Patients 1. Diagnosis: loop ileostomy 2. Restricted fiber diet. 3. Do not drive for 2 weeks or until after your next doctors appointment. 4. Leave steri-strips on incision. They may fall off on their own. 5. May take a shower. 6. No lifting any objects weighing more than 10 pounds. Do not do any housework, such as vacuuming, scrubbing, etc for at least a month. 7. When you get tired during the day, take naps, as you need your rest. 
8. Multiple bowel movements are normal each day for a while. 9. May walk as desired. May go up and down stairs. 10. Take pain medication as prescribed: (NO DRIVING WHILE ON PAIN MEDICATIONS). Oxycodone EVERY 4-6 HOURS AS NEEDED. Other Medications: resume home meds 11. See me in the office in 10-14 days. Call as soon as discharged for an appointment 21 [phone]. IF SURGERY INVOLVED AN OSTOMY BAG, PLEASE BRING YOUR SUPPLIES TO YOUR 1ST VISIT! 12.  Call the Exchange [phone], if you have any questions or problems after office hours. Chart Review Routing History Recipient Method Report Sent By Chelsea Lau MD  
Fax: [phone] Phone: [phone] Fax Lamont Butcher MD NOTES AUTO ROUTING REPORT Shavonne Park MD [[id number]] 3/14/2016 12:02 AM 03/14/2016 Senait Lau MD  
Fax: [phone] Phone: [phone] Fax Notes/Transcriptions Wendy Witt RN [[id number]] 3/17/2016  9:02 AM 03/16/2016 Notes/Transcriptions Wendy Witt RN [[id number]] 3/17/2016  9:02 AM 03/16/2016 Notes/Transcriptions Wendy Witt RN [[id number]] 3/17/2016  9:02 AM 03/16/2016 Notes/Transcriptions Wendy Witt RN [[id number]] 3/17/2016  9:02 AM 03/14/2016 Senait Lau MD  
Fax: [phone] Phone: [phone] Fax Lamont Butcher MD NOTES AUTO ROUTING REPORT Queen Jacque MD [[id number]] 3/17/2016  1:17 PM 03/17/2016 Daniel Durand MD  
Fax: [phone] Phone: [phone] Fax IP Auto Routed MD Henry Keith 9/19/2017  7:13 AM 09/19/2017

## 2017-11-20 NOTE — PROGRESS NOTES
RN spoke with Dr. Corrinne Exon regarding patient's report of persistent elevated pain. Patient reported pain on arrival to unit and was treated with 1mg Dilaudid per order. Monitored x1hr then patient's pain still 6/10. Per MD, ok to give additional 1mg Dilaudid now, then he is placing orders to increase Dilaudid to 1-2mg q4h prn. MD also added orders for Acetaminophen q4h prn and Ativan q6h prn. Will continue to monitor.

## 2017-11-20 NOTE — ANESTHESIA POSTPROCEDURE EVALUATION
Post-Anesthesia Evaluation and Assessment    Patient: Monica Aguirre MRN: [id number]  SSN: [id number]    YOB: 1965  Age: 46 y.o. Sex: female       Cardiovascular Function/Vital Signs  Visit Vitals    /78    Pulse 94    Temp 36.6 °C (97.8 °F)    Resp 11    Ht 5' 2\" (1.575 m)    Wt 95.3 kg (210 lb)    SpO2 96%    BMI 38.41 kg/m2       Patient is status post general anesthesia for Procedure(s):  OPEN DIVERTING LOOP ILEOSTOMY TAKEDOWN. Nausea/Vomiting: None    Postoperative hydration reviewed and adequate. Pain:  Pain Scale 1: Numeric (0 - 10) (11/20/17 1140)  Pain Intensity 1: 7 (11/20/17 1140)   Managed    Neurological Status:   Neuro (WDL): Within Defined Limits (11/20/17 0955)   At baseline    Mental Status and Level of Consciousness: Arousable    Pulmonary Status:   O2 Device: Nasal cannula (11/20/17 1100)   Adequate oxygenation and airway patent    Complications related to anesthesia: None    Post-anesthesia assessment completed.  No concerns    Signed By: Michael Holguin MD     November 20, 2017

## 2017-11-20 NOTE — PROGRESS NOTES
[phone] - Patient had elevated heart rate, 90s-low 100s this afternoon. Patient was also expressing pain and anxiety this afternoon therefore contacted MD for pain and anxiety management, and continued to monitor. Vital signs repeated around 6pm showed continued elevated heart rate = 112, which contributed to MEWS score of 3. Pain at this time is 4/10 and patient reports that this is tolerable. RN assessed surgical site, which shows small-moderate amt of serosanguinous drainage. Placed call to physician on-call for Dr. Viviana Harrison. Will continue to monitor and try to reach MD.        1371 - Received return call from Dr. Steffanie Olguin, on call for Dr. Viviana Harrison, who ordered to administer bolus of 1Liter of Normal Saline over 1hr. Will proceed as ordered and continue to monitor. 1930 - Fluid bolus near complete. Patient's vital signs stable at this time. HR still elevated in low 100s, but patient exhibiting no other signs/symptoms of distress. RN gave detailed SBAR report to oncoming nurse, who will continue to monitor and communicate with MD as needed.

## 2017-11-20 NOTE — IP AVS SNAPSHOT
[address] [address] 
[phone] Patient: Mauricio Madsen MRN: [id number] :1965 About your hospitalization You were admitted on:  2017 You last received care in the:  [place] [address]48 You were discharged on:  2017 Why you were hospitalized Your primary diagnosis was:  Not on File Your diagnoses also included:  Ileostomy In Place (Hcc) Things You Need To Do (next 8 weeks) Follow up with None Where:  None (395) Patient stated that they have no PCP Discharge Orders None A check saman indicates which time of day the medication should be taken. My Medications TAKE these medications as instructed Instructions Each Dose to Equal  
 Morning Noon Evening Bedtime BENADRYL 25 mg capsule Generic drug:  diphenhydrAMINE Your last dose was: Your next dose is: Take 25 mg by mouth nightly as needed. 25 mg  
    
   
   
   
  
 BIOTIN PO Your last dose was: Your next dose is: Take 3-4 Tabs by mouth daily. 3-4 Tab  
    
   
   
   
  
 ibuprofen 200 mg tablet Commonly known as:  MOTRIN Your last dose was: Your next dose is: Take 200 mg by mouth every eight (8) hours as needed for Pain. 200 mg  
    
   
   
   
  
 oxyCODONE IR 5 mg immediate release tablet Commonly known as:  Dorthea Mccreedy Your last dose was: Your next dose is: Take 1 Tab by mouth every four (4) hours as needed for Pain. Max Daily Amount: 30 mg.  
 5 mg  
    
   
   
   
  
 valsartan 160 mg tablet Commonly known as:  DIOVAN Your last dose was: Your next dose is: Take 160 mg by mouth daily. 160 mg Where to Get Your Medications Information on where to get these meds will be given to you by the nurse or doctor. ! Ask your nurse or doctor about these medications  
  oxyCODONE IR 5 mg immediate release tablet Discharge Instructions Fawn Rivera MD, FACS Dennis NICOLE. Mae Cabello MD, FACS Naif NICOLE. Ramandeep Werner MD, FACS Kayla Ramos. Thiago Mandujano MD, FACS Gudelia Lazcano MD, FACS Missael Gaspar. MD Lora Martínez MD 
 
[facility] Discharge Instructions for Colon Surgery Patients 1. Diagnosis: loop ileostomy 2. Restricted fiber diet. 3. Do not drive for 2 weeks or until after your next doctors appointment. 4. Leave steri-strips on incision. They may fall off on their own. 5. May take a shower. 6. No lifting any objects weighing more than 10 pounds. Do not do any housework, such as vacuuming, scrubbing, etc for at least a month. 7. When you get tired during the day, take naps, as you need your rest. 
8. Multiple bowel movements are normal each day for a while. 9. May walk as desired. May go up and down stairs. 10. Take pain medication as prescribed: (NO DRIVING WHILE ON PAIN MEDICATIONS). Oxycodone EVERY 4-6 HOURS AS NEEDED. Other Medications: resume home meds 11. See me in the office in 10-14 days. Call as soon as discharged for an appointment 21 [phone]. IF SURGERY INVOLVED AN OSTOMY BAG, PLEASE BRING YOUR SUPPLIES TO YOUR 1ST VISIT! 12.  Call the Exchange [phone], if you have any questions or problems after office hours. Polantis Announcement We are excited to announce that we are making your provider's discharge notes available to you in Polantis. You will see these notes when they are completed and signed by the physician that discharged you from your recent hospital stay.   If you have any questions or concerns about any information you see in Polantis, please call the [employer] Department where you were seen or reach out to your Primary Care Provider for more information about your plan of care. Introducing [facility]! [employer] introduces plista patient portal. Now you can access parts of your medical record, email your doctor's office, and request medication refills online. 1. In your internet browser, go to [email or web address] 2. Click on the First Time User? Click Here link in the Sign In box. You will see the New Member Sign Up page. 3. Enter your plista Access Code exactly as it appears below. You will not need to use this code after youve completed the sign-up process. If you do not sign up before the expiration date, you must request a new code. · plista Access Code: [email or web address] Expires: 12/20/2017  8:14 AM 
 
4. Enter the last four digits of your Social Security Number (xxxx) and Date of Birth (mm/dd/yyyy) as indicated and click Submit. You will be taken to the next sign-up page. 5. Create a plista ID. This will be your plista login ID and cannot be changed, so think of one that is secure and easy to remember. 6. Create a plista password. You can change your password at any time. 7. Enter your Password Reset Question and Answer. This can be used at a later time if you forget your password. 8. Enter your e-mail address. You will receive e-mail notification when new information is available in [address] 9. Click Sign Up. You can now view and download portions of your medical record. 10. Click the Download Summary menu link to download a portable copy of your medical information. If you have questions, please visit the Frequently Asked Questions section of the plista website. Remember, plista is NOT to be used for urgent needs. For medical emergencies, dial 911. Now available from your iPhone and Android! Providers Seen During Your Hospitalization Provider Specialty Primary office phone Daniel Durand MD Colon and Rectal Surgery [phone] Your Primary Care Physician (PCP) Primary Care Physician Office Phone Office Fax NONE ** None ** ** None ** You are allergic to the following Allergen Reactions Lisinopril Cough Recent Documentation Height Weight BMI OB Status Smoking Status 1.575 m 102.3 kg 41.26 kg/m2 Postmenopausal Former Smoker Emergency Contacts Name Discharge Info Relation Home Work Mobile [place] CAREGIVER [3] Spouse [3] [phone] Patient Belongings The following personal items are in your possession at time of discharge: 
     Visual Aid: Glasses, At bedside             Clothing: Other (comment) (clothing bag to pacu)    Other Valuables: Eyeglasses (glasses to pacu) Please provide this summary of care documentation to your next provider. Signatures-by signing, you are acknowledging that this After Visit Summary has been reviewed with you and you have received a copy. Patient Signature:  ____________________________________________________________ Date:  ____________________________________________________________  
  
Frankie Meredith Provider Signature:  ____________________________________________________________ Date:  ____________________________________________________________

## 2017-11-20 NOTE — BRIEF OP NOTE
BRIEF OPERATIVE NOTE    Date of Procedure: 11/20/2017   Preoperative Diagnosis: PERFORATED DIVERTICULITIS  Postoperative Diagnosis: PERFORATED DIVERTICULITIS    Procedure(s):  OPEN DIVERTING LOOP ILEOSTOMY TAKEDOWN  Surgeon(s) and Role:     * Echo Jiménez MD - Primary         Assistant Staff:       Surgical Staff:  Circ-1: Yulisa Mendez RN  Circ-Relief: Bette Perry RN  Scrub Tech-1: [facility]  Surg Asst-1: [place]  Event Time In   Incision Start 4402   Incision Close 4865     Anesthesia: General   Estimated Blood Loss: 50cc  Specimens:   ID Type Source Tests Collected by Time Destination   1 : ileostomy trim Fresh Colon  Echo Jiménez MD 11/20/2017 0564 Pathology   2 : hernia sac Fresh Abdomen  Echo Jiménez MD 11/20/2017 1777 Pathology      Findings: dense adhesions between loops of small bowel   Complications: none apparent  Implants: * No implants in log *

## 2017-11-21 LAB
ABO + RH BLD: NORMAL
ANION GAP SERPL CALC-SCNC: 9 MMOL/L (ref 5–15)
BLOOD GROUP ANTIBODIES SERPL: NORMAL
BUN SERPL-MCNC: 15 MG/DL (ref 6–20)
BUN/CREAT SERPL: 19 (ref 12–20)
CALCIUM SERPL-MCNC: 7.7 MG/DL (ref 8.5–10.1)
CHLORIDE SERPL-SCNC: 108 MMOL/L (ref 97–108)
CO2 SERPL-SCNC: 24 MMOL/L (ref 21–32)
CREAT SERPL-MCNC: 0.81 MG/DL (ref 0.55–1.02)
ERYTHROCYTE [DISTWIDTH] IN BLOOD BY AUTOMATED COUNT: 14.6 % (ref 11.5–14.5)
GLUCOSE SERPL-MCNC: 139 MG/DL (ref 65–100)
HCT VFR BLD AUTO: 32.7 % (ref 35–47)
HGB BLD-MCNC: 10.8 G/DL (ref 11.5–16)
MAGNESIUM SERPL-MCNC: 2.4 MG/DL (ref 1.6–2.4)
MCH RBC QN AUTO: 29.5 PG (ref 26–34)
MCHC RBC AUTO-ENTMCNC: 33 G/DL (ref 30–36.5)
MCV RBC AUTO: 89.3 FL (ref 80–99)
PHOSPHATE SERPL-MCNC: 3.5 MG/DL (ref 2.6–4.7)
PLATELET # BLD AUTO: 251 K/UL (ref 150–400)
POTASSIUM SERPL-SCNC: 3.7 MMOL/L (ref 3.5–5.1)
RBC # BLD AUTO: 3.66 M/UL (ref 3.8–5.2)
SODIUM SERPL-SCNC: 141 MMOL/L (ref 136–145)
SPECIMEN EXP DATE BLD: NORMAL
WBC # BLD AUTO: 9.9 K/UL (ref 3.6–11)

## 2017-11-21 PROCEDURE — 80048 BASIC METABOLIC PNL TOTAL CA: CPT | Performed by: COLON & RECTAL SURGERY

## 2017-11-21 PROCEDURE — 36415 COLL VENOUS BLD VENIPUNCTURE: CPT | Performed by: COLON & RECTAL SURGERY

## 2017-11-21 PROCEDURE — 74011250637 HC RX REV CODE- 250/637: Performed by: COLON & RECTAL SURGERY

## 2017-11-21 PROCEDURE — 65270000029 HC RM PRIVATE

## 2017-11-21 PROCEDURE — 74011250636 HC RX REV CODE- 250/636: Performed by: COLON & RECTAL SURGERY

## 2017-11-21 PROCEDURE — 84100 ASSAY OF PHOSPHORUS: CPT | Performed by: COLON & RECTAL SURGERY

## 2017-11-21 PROCEDURE — 83735 ASSAY OF MAGNESIUM: CPT | Performed by: COLON & RECTAL SURGERY

## 2017-11-21 PROCEDURE — 85027 COMPLETE CBC AUTOMATED: CPT | Performed by: COLON & RECTAL SURGERY

## 2017-11-21 RX ORDER — HYDROMORPHONE HYDROCHLORIDE 2 MG/ML
2 INJECTION, SOLUTION INTRAMUSCULAR; INTRAVENOUS; SUBCUTANEOUS
Status: DISCONTINUED | OUTPATIENT
Start: 2017-11-21 | End: 2017-11-22 | Stop reason: HOSPADM

## 2017-11-21 RX ORDER — HYDROMORPHONE HYDROCHLORIDE 1 MG/ML
2 INJECTION, SOLUTION INTRAMUSCULAR; INTRAVENOUS; SUBCUTANEOUS
Status: DISCONTINUED | OUTPATIENT
Start: 2017-11-21 | End: 2017-11-21 | Stop reason: SDUPTHER

## 2017-11-21 RX ADMIN — OXYCODONE HYDROCHLORIDE 5 MG: 5 TABLET ORAL at 18:31

## 2017-11-21 RX ADMIN — SODIUM CHLORIDE 150 ML/HR: 900 INJECTION, SOLUTION INTRAVENOUS at 14:04

## 2017-11-21 RX ADMIN — HYDROMORPHONE HYDROCHLORIDE 2 MG: 2 INJECTION, SOLUTION INTRAMUSCULAR; INTRAVENOUS; SUBCUTANEOUS at 21:30

## 2017-11-21 RX ADMIN — LORAZEPAM 1 MG: 2 INJECTION INTRAMUSCULAR; INTRAVENOUS at 19:30

## 2017-11-21 RX ADMIN — HYDROMORPHONE HYDROCHLORIDE 2 MG: 2 INJECTION, SOLUTION INTRAMUSCULAR; INTRAVENOUS; SUBCUTANEOUS at 16:47

## 2017-11-21 RX ADMIN — SODIUM CHLORIDE 150 ML/HR: 900 INJECTION, SOLUTION INTRAVENOUS at 19:34

## 2017-11-21 RX ADMIN — ALVIMOPAN 12 MG: 12 CAPSULE ORAL at 09:01

## 2017-11-21 RX ADMIN — ACETAMINOPHEN 650 MG: 325 TABLET ORAL at 18:31

## 2017-11-21 RX ADMIN — ENOXAPARIN SODIUM 40 MG: 40 INJECTION SUBCUTANEOUS at 09:02

## 2017-11-21 RX ADMIN — LORAZEPAM 1 MG: 2 INJECTION INTRAMUSCULAR; INTRAVENOUS at 08:03

## 2017-11-21 RX ADMIN — ALVIMOPAN 12 MG: 12 CAPSULE ORAL at 18:28

## 2017-11-21 RX ADMIN — LOSARTAN POTASSIUM 100 MG: 50 TABLET ORAL at 09:00

## 2017-11-21 RX ADMIN — OXYCODONE HYDROCHLORIDE 5 MG: 5 TABLET ORAL at 13:57

## 2017-11-21 RX ADMIN — CELECOXIB 100 MG: 100 CAPSULE ORAL at 21:11

## 2017-11-21 RX ADMIN — CELECOXIB 100 MG: 100 CAPSULE ORAL at 09:01

## 2017-11-21 RX ADMIN — KETOROLAC TROMETHAMINE 15 MG: 30 INJECTION, SOLUTION INTRAMUSCULAR at 03:42

## 2017-11-21 RX ADMIN — SODIUM CHLORIDE 150 ML/HR: 900 INJECTION, SOLUTION INTRAVENOUS at 00:46

## 2017-11-21 RX ADMIN — HYDROMORPHONE HYDROCHLORIDE 2 MG: 1 INJECTION, SOLUTION INTRAMUSCULAR; INTRAVENOUS; SUBCUTANEOUS at 09:34

## 2017-11-21 NOTE — PROGRESS NOTES
Bedside and Verbal shift change report given to Nahum Haley RN (oncoming nurse) by Stevo Sargent RN (offgoing nurse). Report included the following information SBAR, Kardex, ED Summary, Intake/Output, MAR and Recent Results.

## 2017-11-21 NOTE — PROGRESS NOTES
Chart reviewed. Patient is post-op day one of a Loop ileostomy takedown by Dr. Hebert Dunlap. CM met with patient to introduce role of CM and discuss discharge planning. Patient lives with her  Jaja Olivares in a private residence in [place]. Patient stated they recently moved from [facility] to [place] (which is near [facility]). Patient is independent with ADLs and uses no DME. Patient is currently on 02 but does not use home 02. Patient is currently employed. Confirmed insurance is [employer]. Patient has no PCP but stated she is in the process of finding a PCP in her area. Patient gets medications at [facility]. Family will transport patient home via car at discharge. CM will follow and be available if needs arise. Care Management Interventions  PCP Verified by CM: No  Mode of Transport at Discharge:  Other (see comment) (family)  Transition of Care Consult (CM Consult): Discharge Planning  MyChart Signup: No  Discharge Durable Medical Equipment: No  Physical Therapy Consult: No  Occupational Therapy Consult: No  Speech Therapy Consult: No  Current Support Network: Lives with Spouse, Own Home  Confirm Follow Up Transport: Family  Plan discussed with Pt/Family/Caregiver: Yes  Discharge Location  Discharge Placement: Home     NICOLE Cheema/CRM

## 2017-11-21 NOTE — PROGRESS NOTES
Patient's  sleeping and 116 when awake post bolus. Dr. Nya Tavera notified and he stated to draw H&H, call back if below 8, and increase IV fluids to 150 ml/hr. Nurse will draw labs, increase fluids, and continue to monitor.

## 2017-11-21 NOTE — PROGRESS NOTES
Doing well  +flatus  Visit Vitals    /80 (BP 1 Location: Left arm, BP Patient Position: Sitting)    Pulse (!) 101    Temp 98 °F (36.7 °C)    Resp 18    Ht 5' 2\" (1.575 m)    Wt 95.3 kg (210 lb)    SpO2 91%    BMI 38.41 kg/m2       Date 11/20/17 0700 - 11/21/17 0659 11/21/17 0700 - 11/22/17 0659   Shift [id number] 1900-0659 24 Hour Total 2744-1269 9574-4452 24 Hour Total   I  N  T  A  K  E   P.O. 240 240 480         P. O. 240 240 480       I.V.  (mL/kg/hr) 1250  (1.1)  1250  (0.5)         I.V. 50  50         Volume (lactated Ringers infusion) 1200  1200       Shift Total  (mL/kg) 1490  (15.6) 240  (2.5) 1730  (18.2)      O  U  T  P  U  T   Urine  (mL/kg/hr) 270  (0.2) 575  (0.5) 845  (0.4) 1150  1150      Urine Occurrence(s)  1 x 1 x         Urine Output 170  170         Urine Output (mL) (Urinary Catheter 11/20/17 2- way; [place]) 100   1150    Stool            Stool Occurrence(s)  1 x 1 x       Blood 10  10         Estimated Blood Loss 10  10       Shift Total  (mL/kg) 280  (2.9) 575  (6) 855  (9) 1150  (12.1)  1150  (12.1)   NET 1210 -335 875 -1150  -1150   Weight (kg) 95.3 95.3 95.3 95.3 95.3 95.3     abd soft   Wick removed    A/P home tomorrow if all goes well

## 2017-11-21 NOTE — PROGRESS NOTES
Primary Nurse José Miguel Sanchez RN and Levar Cano RN performed a dual skin assessment on this patient No impairment noted  Estiven score is 23

## 2017-11-21 NOTE — PROGRESS NOTES
Bedside shift change report given to Yakov Lopez 8141 (oncoming nurse) by Renae Saucedo (offgoing nurse). Report included the following information SBAR.

## 2017-11-21 NOTE — PROGRESS NOTES
Bedside shift change report given to Jose Hernandez RN (oncoming nurse) by Lucila Browne RN (offgoing nurse). Report included the following information SBAR, Intake/Output and MAR.

## 2017-11-22 VITALS
DIASTOLIC BLOOD PRESSURE: 97 MMHG | BODY MASS INDEX: 41.51 KG/M2 | WEIGHT: 225.6 LBS | SYSTOLIC BLOOD PRESSURE: 152 MMHG | RESPIRATION RATE: 16 BRPM | OXYGEN SATURATION: 93 % | HEIGHT: 62 IN | TEMPERATURE: 98 F | HEART RATE: 86 BPM

## 2017-11-22 PROCEDURE — 74011250637 HC RX REV CODE- 250/637: Performed by: COLON & RECTAL SURGERY

## 2017-11-22 PROCEDURE — 74011250636 HC RX REV CODE- 250/636: Performed by: COLON & RECTAL SURGERY

## 2017-11-22 RX ORDER — OXYCODONE HYDROCHLORIDE 5 MG/1
5 TABLET ORAL
Qty: 40 TAB | Refills: 0 | Status: SHIPPED | OUTPATIENT
Start: 2017-11-22

## 2017-11-22 RX ADMIN — ENOXAPARIN SODIUM 40 MG: 40 INJECTION SUBCUTANEOUS at 09:19

## 2017-11-22 RX ADMIN — CELECOXIB 100 MG: 100 CAPSULE ORAL at 09:18

## 2017-11-22 RX ADMIN — ALVIMOPAN 12 MG: 12 CAPSULE ORAL at 09:18

## 2017-11-22 RX ADMIN — ONDANSETRON 4 MG: 4 TABLET, ORALLY DISINTEGRATING ORAL at 08:40

## 2017-11-22 RX ADMIN — HYDROMORPHONE HYDROCHLORIDE 2 MG: 2 INJECTION, SOLUTION INTRAMUSCULAR; INTRAVENOUS; SUBCUTANEOUS at 07:33

## 2017-11-22 RX ADMIN — HYDROMORPHONE HYDROCHLORIDE 2 MG: 2 INJECTION, SOLUTION INTRAMUSCULAR; INTRAVENOUS; SUBCUTANEOUS at 14:00

## 2017-11-22 RX ADMIN — OXYCODONE HYDROCHLORIDE 5 MG: 5 TABLET ORAL at 11:25

## 2017-11-22 RX ADMIN — LOSARTAN POTASSIUM 100 MG: 50 TABLET ORAL at 09:18

## 2017-11-22 RX ADMIN — OXYCODONE HYDROCHLORIDE 5 MG: 5 TABLET ORAL at 00:01

## 2017-11-22 RX ADMIN — HYDROMORPHONE HYDROCHLORIDE 2 MG: 2 INJECTION, SOLUTION INTRAMUSCULAR; INTRAVENOUS; SUBCUTANEOUS at 02:53

## 2017-11-22 NOTE — DISCHARGE INSTRUCTIONS
Cristina Haney MD, FACS  Dnenis Keane MD, FACS  Melinda Yadav. Arlette Sadler MD, [address] [facility] Myriam Ramon MD, [address] [facility] Dunia Walter MD, FACS  Dione Alvarez. MD Linda Garza MD    [facility] Discharge Instructions for Colon Surgery Patients    1. Diagnosis: loop ileostomy  2. Restricted fiber diet. 3. Do not drive for 2 weeks or until after your next doctors appointment. 4. Leave steri-strips on incision. They may fall off on their own. 5. May take a shower. 6. No lifting any objects weighing more than 10 pounds. Do not do any housework, such as vacuuming, scrubbing, etc for at least a month. 7. When you get tired during the day, take naps, as you need your rest.  8. Multiple bowel movements are normal each day for a while. 9. May walk as desired. May go up and down stairs. 10. Take pain medication as prescribed: (NO DRIVING WHILE ON PAIN MEDICATIONS). Oxycodone EVERY 4-6 HOURS AS NEEDED. Other Medications: resume home meds  11. See me in the office in 10-14 days. Call as soon as discharged for an appointment 21 [phone]. IF SURGERY INVOLVED AN OSTOMY BAG, PLEASE BRING YOUR SUPPLIES TO YOUR 1ST VISIT! 12.  Call the Exchange [phone], if you have any questions or problems after office hours.

## 2017-11-22 NOTE — PROGRESS NOTES
Bedside shift change report given to [facility] RN (oncoming nurse) by Darnell Loza RN (offgoing nurse). Report included the following information SBAR, Kardex, Procedure Summary, MAR, Accordion and Recent Results.

## 2017-11-22 NOTE — PROGRESS NOTES
While ambulating O2 sats stayed around 88-92%. sats would increase when pt would deep breath. Instructed pt to continue working with I.S. 10-20 times per hour.

## 2017-11-22 NOTE — DISCHARGE SUMMARY
Physician Discharge Summary     Patient ID:  Jenny Conway  [id number]  83 y.o.  1965    Admit Date: 11/20/2017    Discharge Date: 11/22/2017    * Admission Diagnoses: PERFORATED DIVERTICULITIS  Ileostomy in place [facility])    * Discharge Diagnoses:    Hospital Problems as of 11/22/2017  Date Reviewed: 5/15/2017          Codes Class Noted - Resolved POA    Ileostomy in place [facility]) ICD-10-CM: Z93.2  ICD-9-CM: V44.2  11/20/2017 - Present Unknown               Admission Condition: Good    * Discharge Condition: good    * Procedures: Procedure(s):  OPEN DIVERTING LOOP ILEOSTOMY TAKEDOWN    * Hospital Course:   Normal hospital course for this procedure. Consults: None    Significant Diagnostic Studies: see chart    * Disposition: Home    Discharge Medications:   Current Discharge Medication List      START taking these medications    Details   oxyCODONE IR (ROXICODONE) 5 mg immediate release tablet Take 1 Tab by mouth every four (4) hours as needed for Pain. Max Daily Amount: 30 mg.  Qty: 40 Tab, Refills: 0         CONTINUE these medications which have NOT CHANGED    Details   valsartan (DIOVAN) 160 mg tablet Take 160 mg by mouth daily. BIOTIN PO Take 3-4 Tabs by mouth daily. ibuprofen (MOTRIN) 200 mg tablet Take 200 mg by mouth every eight (8) hours as needed for Pain. diphenhydrAMINE (BENADRYL) 25 mg capsule Take 25 mg by mouth nightly as needed. * Follow-up Care/Patient Instructions: Activity: No lifting, Driving, or Strenuous exercise for 4 weeks  Diet: Regular Diet  Wound Care: Keep wound clean and dry    Follow-up Information     Follow up With Details Comments Contact Info    None   None (395) Patient stated that they have no PCP              Signed:   Jennifer Ramirez MD  11/22/2017  8:05 AM

## 2018-02-13 ENCOUNTER — HOSPITAL ENCOUNTER (OUTPATIENT)
Dept: CT IMAGING | Age: 53
Discharge: HOME | End: 2018-02-13
Attending: COLON & RECTAL SURGERY
Payer: COMMERCIAL

## 2018-02-13 DIAGNOSIS — R10.9 ABDOMINAL PAIN: ICD-10-CM

## 2018-02-13 PROCEDURE — 74011000258 HC RX REV CODE- 258: Performed by: COLON & RECTAL SURGERY

## 2018-02-13 PROCEDURE — 74011636320 HC RX REV CODE- 636/320: Performed by: COLON & RECTAL SURGERY

## 2018-02-13 PROCEDURE — 74177 CT ABD & PELVIS W/CONTRAST: CPT

## 2018-02-13 RX ORDER — SODIUM CHLORIDE 0.9 % (FLUSH) 0.9 %
10 SYRINGE (ML) INJECTION
Status: COMPLETED | OUTPATIENT
Start: 2018-02-13 | End: 2018-02-13

## 2018-02-13 RX ADMIN — IOHEXOL 50 ML: 240 INJECTION, SOLUTION INTRATHECAL; INTRAVASCULAR; INTRAVENOUS; ORAL at 14:33

## 2018-02-13 RX ADMIN — Medication 10 ML: at 14:33

## 2018-02-13 RX ADMIN — IOPAMIDOL 100 ML: 755 INJECTION, SOLUTION INTRAVENOUS at 14:33

## 2018-02-13 RX ADMIN — SODIUM CHLORIDE 100 ML: 900 INJECTION, SOLUTION INTRAVENOUS at 14:33

## 2022-11-29 NOTE — ED NOTES
Patient given discharge information. All questions and concerns addressed. No additional needs at this time. Patient ambulatory out. Female

## 2023-10-25 NOTE — PROGRESS NOTES
Is the patient on isolation?: Yes Comment: Bed Bugs   Do you expect the patient to require telemetry (informational-only for bed management): Yes   Do you expect the patient to require observation or inpt? (informational-only for bed management): Observation   Pharmacy Automatic Renal Dosing Protocol - Antimicrobials    Indication for Antimicrobials: Perforated Diverticulum/abscess POA  Current Regimen of Each Antimicrobial (Start Day & Day of Therapy):  Cefepime 2 gm IV every 8hrs - SD 5/15/17 - Day 1  Metronidazole 500mg IV every 8hrs - SD 17 - Day 2      Significant Cultures:  17 - Urine - pending  CAPD, Hemodialysis or Renal Replacement Therapy: None  Paralysis, amputations, malnutrition: None  Recent Labs      05/15/17   0322  17   1442   CREA  0.43*  0.51*   BUN  8  10   WBC  11.3*  14.7*     Temp (24hrs), Av.4 ° F (36.9 ° C), Min:98 ° F (36.7 ° C), Max:98.7 ° F (37.1 ° C)    Creatinine Clearance (Creatinine Clearance (ml/min)): >100    Impression/Plan:   - Will continue Cefepime 2 gm Iv every 8hrs as appropriate for current renal function and indication.   - No renal dose adjustments are needed for Metronidazole         Pharmacy will follow daily and adjust medications as appropriate for renal function and/or serum levels.     Thank you,  Dory Stallworth, [facility]

## (undated) DEVICE — SOLUTION IV 1000ML 0.9% SOD CHL

## (undated) DEVICE — KENDALL SCD EXPRESS SLEEVES, KNEE LENGTH, MEDIUM: Brand: KENDALL SCD

## (undated) DEVICE — CURVED, LARGE JAW, OPEN SEALER/DIVIDER NANO-COATED: Brand: LIGASURE IMPACT

## (undated) DEVICE — DEVON™ KNEE AND BODY STRAP 60" X 3" (1.5 M X 7.6 CM): Brand: DEVON

## (undated) DEVICE — Device

## (undated) DEVICE — SUTURE ETHLN SZ 2-0 L18IN NONABSORBABLE BLK L26MM PS 3/8 585H

## (undated) DEVICE — SUTURE PERMAHAND SZ 3-0 L18IN NONABSORBABLE BLK L26MM SH C013D

## (undated) DEVICE — VISUALIZATION SYSTEM: Brand: CLEARIFY

## (undated) DEVICE — POOLE SUCTION INSTRUMENT WITH REMOVABLE SHEATH: Brand: POOLE

## (undated) DEVICE — AIRSEAL 8 MM ACCESS PORT AND LOW PROFILE OBTURATOR WITH BLADELESS OPTICAL TIP, 120 MM LENGTH: Brand: AIRSEAL

## (undated) DEVICE — INTENDED FOR TISSUE SEPARATION, AND OTHER PROCEDURES THAT REQUIRE A SHARP SURGICAL BLADE TO PUNCTURE OR CUT.: Brand: BARD-PARKER ® CARBON RIB-BACK BLADES

## (undated) DEVICE — ROCKER SWITCH PENCIL BLADE ELECTRODE, HOLSTER: Brand: EDGE

## (undated) DEVICE — SPONGE LAP 18X18IN STRL -- 5/PK

## (undated) DEVICE — 3M™ TEGADERM™ TRANSPARENT FILM DRESSING FRAME STYLE, 1624W, 2-3/8 IN X 2-3/4 IN (6 CM X 7 CM), 100/CT 4CT/CASE: Brand: 3M™ TEGADERM™

## (undated) DEVICE — REDUCER CANN ENDOWRIST 12-8MM -- DA VINCI XI - SNGL USE

## (undated) DEVICE — STAPLER INT L75MM CUT LN L73MM STPL LN L77MM BLU B FRM 8

## (undated) DEVICE — ELECTRO LUBE IS A SINGLE PATIENT USE DEVICE THAT IS INTENDED TO BE USED ON ELECTROSURGICAL ELECTRODES TO REDUCE STICKING.: Brand: KEY SURGICAL ELECTRO LUBE

## (undated) DEVICE — CATHETER DRNGE 32FR 4 WNG DISP FOR NEPHSTMY MALECOTS

## (undated) DEVICE — WRAP SURG W1.31XL1.34M CARD FOR PT 165-172CM THERMOWRP

## (undated) DEVICE — DRAPE,REIN 53X77,STERILE: Brand: MEDLINE

## (undated) DEVICE — 3M™ TEGADERM™ TRANSPARENT FILM DRESSING FRAME STYLE, 1626W, 4 IN X 4-3/4 IN (10 CM X 12 CM), 50/CT 4CT/CASE: Brand: 3M™ TEGADERM™

## (undated) DEVICE — GAUZE SPONGES,12 PLY: Brand: CURITY

## (undated) DEVICE — STERILE-Z MAYO STAND COVERS CLEAR POLYETHYLENE STERILE UNIVERSAL FIT 20 PER CASE: Brand: STERILE-Z

## (undated) DEVICE — LEGGINGS, PAIR, 31X48, STERILE: Brand: MEDLINE

## (undated) DEVICE — SUTURE PDS II SZ 1 L27IN ABSRB VLT CT-1 L36MM 1/2 CIR Z341H

## (undated) DEVICE — ARM DRAPE

## (undated) DEVICE — SUTURE VCRL 2-0 L27IN ABSRB UD CP-2 L26MM 1/2 CIR REV CUT J869H

## (undated) DEVICE — ACCESS PLATFORM FOR MINIMALLY INVASIVE SURGERY: Brand: GELPOINT®  MINI ADVANCED ACCESS PLATFORM

## (undated) DEVICE — TRAY CATH W/ 16FR CATH URIN M CTRL FIT OUTLT TB F STATLOK

## (undated) DEVICE — SUTURE VCRL SZ 0 L27IN ABSRB UD L26MM CT-2 1/2 CIR J270H

## (undated) DEVICE — DBD-PACK,LAPAROTOMY,2 REINFORCED GOWNS: Brand: MEDLINE

## (undated) DEVICE — STERILE POLYISOPRENE POWDER-FREE SURGICAL GLOVES WITH EMOLLIENT COATING: Brand: PROTEXIS

## (undated) DEVICE — HANDLE LT SNAP ON ULT DURABLE LENS FOR TRUMPF ALC DISPOSABLE

## (undated) DEVICE — LAVH/LAPAROSCOPY DRAPE: Brand: CONVERTORS

## (undated) DEVICE — COLOSTOMY/ILEOSTOMY KIT,FLEXWEAR: Brand: NEW IMAGE

## (undated) DEVICE — BLADELESS OBTURATOR

## (undated) DEVICE — SURGICAL PROCEDURE PACK BASIN MAJ SET CUST NO CAUT

## (undated) DEVICE — STERILE POLYISOPRENE POWDER-FREE SURGICAL GLOVES: Brand: PROTEXIS

## (undated) DEVICE — SURGICAL PROCEDURE KIT GEN LAPAROSCOPY LF

## (undated) DEVICE — BLUNT TROCAR WITH THREADED ANCHOR: Brand: VERSAONE

## (undated) DEVICE — INFECTION CONTROL KIT SYS

## (undated) DEVICE — SMALL GRASPING RETRACTOR: Brand: ENDOWRIST

## (undated) DEVICE — BULB SYRINGE, IRRIGATION WITH PROTECTIVE CAP, 60 CC, INDIVIDUALLY WRAPPED: Brand: DOVER

## (undated) DEVICE — TIP COVER ACCESSORY

## (undated) DEVICE — TOWEL SURG W17XL27IN STD BLU COT NONFENESTRATED PREWASHED

## (undated) DEVICE — SUTURE PDS II SZ 3-0 L27IN ABSRB VLT L26MM SH 1/2 CIR Z316H

## (undated) DEVICE — REM POLYHESIVE ADULT PATIENT RETURN ELECTRODE: Brand: VALLEYLAB

## (undated) DEVICE — DRAIN CHN 19FR L0.25MM DIA6.3MM SIL RND HUBLESS FULL FLUT

## (undated) DEVICE — SCISSORS SURG DIA8MM MPLR CRV ENDOWRIST

## (undated) DEVICE — BLADELESS OPTICAL TROCAR WITH FIXATION CANNULA: Brand: VERSAPORT

## (undated) DEVICE — SUTURE PERMAHAND SZ 0 L30IN NONABSORBABLE BLK SILK BRAID A306H

## (undated) DEVICE — SOLUTION IRRIGATION H2O 0797305] ICU MEDICAL INC]

## (undated) DEVICE — SUTURE VCRL SZ 3-0 L27IN ABSRB VLT L36MM CT-1 1/2 CIR J338H

## (undated) DEVICE — 3000CC GUARDIAN II: Brand: GUARDIAN

## (undated) DEVICE — JELLY,LUBE,STERILE,FLIP TOP,TUBE,4-OZ: Brand: MEDLINE

## (undated) DEVICE — RELOAD STPL L75MM OPN H3.8MM CLS 1.5MM WIRE DIA0.2MM REG

## (undated) DEVICE — BAG COLLECTION FLD OR-TBL NS --

## (undated) DEVICE — TRAY CATH OD16FR SIL URIN M STATLOK STBL DEV SURSTP

## (undated) DEVICE — Z DISCONTINUEDSOLUTION PREP 2OZ 10% POVIDONE IOD SCR CAP BTL

## (undated) DEVICE — SLIM BODY SKIN STAPLER: Brand: APPOSE ULC

## (undated) DEVICE — BASIC PACK: Brand: CONVERTORS

## (undated) DEVICE — SUT SLK 2-0SH 30IN BLK --

## (undated) DEVICE — SEAL UNIV 5-8MM DISP BX/10 -- DA VINCI XI - SNGL USE

## (undated) DEVICE — LARGE SUTURE CUT NEEDLE DRIVER: Brand: ENDOWRIST

## (undated) DEVICE — SURGICAL PROCEDURE PACK TISS 3X5 IN ABSORBABLE SEPRAFILM

## (undated) DEVICE — DERMABOND SKIN ADH 0.7ML -- DERMABOND ADVANCED 12/BX

## (undated) DEVICE — SUTURE MCRYL SZ 4-0 L27IN ABSRB UD L19MM PS-2 1/2 CIR PRIM Y426H

## (undated) DEVICE — 1200 GUARD II KIT W/5MM TUBE W/O VAC TUBE: Brand: GUARDIAN

## (undated) DEVICE — SKIN TEMPERATURE SENSOR: Brand: DEROYAL

## (undated) DEVICE — SUTURE PROL SZ 2-0 L36IN NONABSORBABLE BLU SH L26MM 1/2 CIR 8523H

## (undated) DEVICE — TRAY PREP DRY W/ PREM GLV 2 APPL 6 SPNG 2 UNDPD 1 OVERWRAP

## (undated) DEVICE — (D)PREP SKN CHLRAPRP APPL 26ML -- CONVERT TO ITEM 371833

## (undated) DEVICE — FENESTRATED BIPOLAR FORCEPS: Brand: ENDOWRIST

## (undated) DEVICE — SUTURE VCRL SZ 1 L27IN ABSRB VLT L36MM CT-1 1/2 CIR J341H

## (undated) DEVICE — TUBING INSUFLTN 10FT LUER -- CONVERT TO ITEM 368568

## (undated) DEVICE — SUTURE VCRL SZ 3-0 L27IN ABSRB UD L26MM SH 1/2 CIR J416H

## (undated) DEVICE — VESSEL SEALER: Brand: ENDOWRIST

## (undated) DEVICE — SUTURE VCRL SZ 0 L27IN ABSRB VLT CP-1 L36MM 1/2 CIR SGL J467H

## (undated) DEVICE — DRAPE FLD WRM W44XL66IN C6L FOR INTRATEMP SYS THERMABASIN

## (undated) DEVICE — COVER,MAYO STAND,STERILE: Brand: MEDLINE

## (undated) DEVICE — TRI-LUMEN FILTERED TUBE SET WITH ACTIVATED CHARCOAL FILTER: Brand: AIRSEAL

## (undated) DEVICE — TRAY CATH 16F DRN BG LTX -- CONVERT TO ITEM 363158

## (undated) DEVICE — CYSTO/BLADDER IRRIGATION SET, REGULATING CLAMP